# Patient Record
Sex: FEMALE | Race: WHITE | NOT HISPANIC OR LATINO | Employment: FULL TIME | URBAN - METROPOLITAN AREA
[De-identification: names, ages, dates, MRNs, and addresses within clinical notes are randomized per-mention and may not be internally consistent; named-entity substitution may affect disease eponyms.]

---

## 2017-09-14 ENCOUNTER — TRANSCRIBE ORDERS (OUTPATIENT)
Dept: ADMINISTRATIVE | Facility: HOSPITAL | Age: 47
End: 2017-09-14

## 2017-09-14 DIAGNOSIS — Z12.31 VISIT FOR SCREENING MAMMOGRAM: Primary | ICD-10-CM

## 2017-10-18 ENCOUNTER — HOSPITAL ENCOUNTER (OUTPATIENT)
Dept: RADIOLOGY | Facility: HOSPITAL | Age: 47
Discharge: HOME | End: 2017-10-18
Payer: COMMERCIAL

## 2017-10-18 DIAGNOSIS — Z12.31 VISIT FOR SCREENING MAMMOGRAM: ICD-10-CM

## 2017-10-18 PROCEDURE — G0202 SCR MAMMO BI INCL CAD: HCPCS

## 2017-11-22 ENCOUNTER — ALLSCRIPTS OFFICE VISIT (OUTPATIENT)
Dept: OTHER | Facility: OTHER | Age: 47
End: 2017-11-22

## 2017-11-22 PROCEDURE — 88305 TISSUE EXAM BY PATHOLOGIST: CPT | Performed by: SURGERY

## 2017-11-23 NOTE — CONSULTS
Assessment    1  Skin lesion (209 9) (L98 9)    Discussion/Summary  Discussion Summary:   Please see HPI  The lesion of the right upper eyelid was excised utilizing curved iris scissors  The area was prepped with alcohol and infiltrated with Xylocaine with epinephrine  6-0 nylon sutures were used for the repair  The usual posttreatment instructions were given, she'll be seen in 1 week for suture removal       Chief Complaint  Chief Complaint Free Text Note Form: Lesion right upper eyelid      History of Present Illness  HPI: Faye Chua presents today for evaluation of a vascular lesion of the right upper eyelid, it has been present for approximately one year, it has slowly grown in size  Review of Systems  Complete-Female:  Constitutional: no fever-- and-- no chills  Eyes: eyesight problems-- and-- Wears eyeglasses  ENT: no hearing loss  Cardiovascular: no chest pain-- and-- no lower extremity edema  Respiratory: no shortness of breath  Gastrointestinal: no nausea,-- no vomiting,-- no constipation,-- no diarrhea-- and-- no blood in stools  Genitourinary: no dysuria-- and-- no incontinence  Musculoskeletal: no limb swelling  Integumentary: no rashes-- and-- no skin wound  Neurological: no headache,-- no confusion-- and-- no convulsions  Psychiatric: depression, but-- no anxiety  Endocrine: no proptosis-- and-- no deepening of the voice  Hematologic/Lymphatic: no tendency for easy bleeding-- and-- no tendency for easy bruising  Active Problems  1  Depression (311) (F32 9)   2  Esophageal reflux (530 81) (K21 9)   3  Lumbago (724 2) (M54 5)    Past Medical History  1  History of Asthma (493 90) (J45 909)   2  History of Floaters   3  History of Fracture (829 0)  Active Problems And Past Medical History Reviewed: The active problems and past medical history were reviewed and updated today  Surgical History  1  History of  Section   2  History of Cholecystectomy   3   History of Hysteroscopy   4  History of Sinus Surgery  Surgical History Reviewed: The surgical history was reviewed and updated today  Family History  Family History    1  Family history of No Significant Family History  Family History Reviewed: The family history was reviewed and updated today  Social History     · Denied: History of Alcohol Use (History)   · Denied: History of Drug Use   · Marital History - Currently    · Never A Smoker   · Occupation:   · Sexually Active  Social History Reviewed: The social history was reviewed and updated today  The social history was reviewed and is unchanged  Current Meds   1  Cyclobenzaprine HCl - 5 MG Oral Tablet; TAKE 1 TABLET AT BEDTIME AS NEEDED; Therapy: [id number] to (Evaluate:11Nov2013); Last Rx:[id number] Ordered   2  Flexeril 5 MG TABS; TAKE 1 TABLET AT BEDTIME; Therapy: (Recorded:[id number]) to Recorded   3  Multi-Vitamin Oral Tablet; TAKE 1 TABLET DAILY; Therapy: (Recorded:[id number]) to Recorded   4  Naproxen 500 MG Oral Tablet; TAKE 1 TABLET EVERY 12 HOURS DAILY; Therapy: (Recorded:[id number]) to Recorded   5  PriLOSEC 40 MG CPDR; TAKE 1 CAPSULE DAILY; Therapy: (Recorded:81Oug8465) to Recorded   6  SEROquel 200 MG Oral Tablet; TAKE 1 TABLET DAILY; Therapy: (Recorded:33Kji8694) to Recorded   7  Vitamin C TABS; TAKE 1 TABLET DAILY; Therapy: (Recorded:49Ruk9803) to Recorded   8  Vitamin E TABS; TAKE 1 TABLET DAILY; Therapy: (Recorded:26Eba2425) to Recorded   9  Zoloft TABS; TAKE 1 TABLET DAILY; Therapy: (Recorded:48Psx6832) to Recorded  Medication List Reviewed: The medication list was reviewed and updated today  Allergies  1   Amoxicillin CAPS    Vitals  Vital Signs    Recorded: 22Nov2017 10:44AM   BP Comments unobtainable   Height 5 ft 4 in   Weight 239 lb 8 oz   BMI Calculated 41 11   BSA Calculated 2 11       Physical Exam  General Complete Exam (Brief) [facility] [place]:  Constitutional  General appearance: No acute distress, well appearing and well nourished  Eyes  Conjunctiva and lids: Abnormal  -- Purple, vascular lesion right upper eyelid, approximately 4 mm in diameter  Ears, Nose, Mouth, and Throat  External inspection of ears and nose: Normal    Pulmonary  Respiratory effort: No increased work of breathing or signs of respiratory distress  Musculoskeletal  Gait and station: Normal    Skin  Skin and subcutaneous tissue: Normal without rashes or lesions     Psychiatric  Orientation to person, place, and time: Normal    Mood and affect: Normal        Signatures   Electronically signed by : SARAH Stephens ; Nov 22 2017 11:11AM EST                       (Author)

## 2017-11-24 ENCOUNTER — LAB REQUISITION (OUTPATIENT)
Dept: LAB | Facility: HOSPITAL | Age: 47
End: 2017-11-24
Payer: COMMERCIAL

## 2017-11-24 DIAGNOSIS — L98.9 DISORDER OF SKIN OR SUBCUTANEOUS TISSUE: ICD-10-CM

## 2017-11-27 ENCOUNTER — GENERIC CONVERSION - ENCOUNTER (OUTPATIENT)
Dept: OTHER | Facility: OTHER | Age: 47
End: 2017-11-27

## 2018-01-13 VITALS — BODY MASS INDEX: 40.89 KG/M2 | WEIGHT: 239.5 LBS | HEIGHT: 64 IN

## 2018-01-18 NOTE — CONSULTS
History of Present Illness  Ewa presents today for evaluation of a vascular lesion of the right upper eyelid, it has been present for approximately one year, it has slowly grown in size  Discussion/Summary    Please see HPI  The lesion of the right upper eyelid was excised utilizing curved iris scissors  The area was prepped with alcohol and infiltrated with Xylocaine with epinephrine  6-0 nylon sutures were used for the repair   The usual posttreatment instructions were given, she'll be seen in 1 week for suture removal       Signatures   Electronically signed by : SARAH Robin ; Nov 22 2017 11:11AM EST                       (Author)

## 2018-07-30 DIAGNOSIS — I10 HYPERTENSION, UNSPECIFIED TYPE: Primary | ICD-10-CM

## 2018-07-30 DIAGNOSIS — E11.9 TYPE 2 DIABETES MELLITUS WITHOUT COMPLICATION, WITHOUT LONG-TERM CURRENT USE OF INSULIN (HCC): ICD-10-CM

## 2018-07-30 DIAGNOSIS — R79.89 LOW VITAMIN D LEVEL: ICD-10-CM

## 2018-07-30 DIAGNOSIS — F32.A DEPRESSION, UNSPECIFIED DEPRESSION TYPE: ICD-10-CM

## 2018-07-30 DIAGNOSIS — E78.5 HYPERLIPIDEMIA, UNSPECIFIED HYPERLIPIDEMIA TYPE: ICD-10-CM

## 2018-07-31 ENCOUNTER — TRANSCRIBE ORDERS (OUTPATIENT)
Dept: ADMINISTRATIVE | Facility: HOSPITAL | Age: 48
End: 2018-07-31

## 2018-07-31 ENCOUNTER — APPOINTMENT (OUTPATIENT)
Dept: LAB | Facility: IMAGING CENTER | Age: 48
End: 2018-07-31
Payer: COMMERCIAL

## 2018-07-31 DIAGNOSIS — I10 HYPERTENSION, UNSPECIFIED TYPE: ICD-10-CM

## 2018-07-31 DIAGNOSIS — E78.5 HYPERLIPIDEMIA, UNSPECIFIED HYPERLIPIDEMIA TYPE: ICD-10-CM

## 2018-07-31 DIAGNOSIS — R79.89 LOW VITAMIN D LEVEL: ICD-10-CM

## 2018-07-31 DIAGNOSIS — E11.9 TYPE 2 DIABETES MELLITUS WITHOUT COMPLICATION, WITHOUT LONG-TERM CURRENT USE OF INSULIN (HCC): ICD-10-CM

## 2018-07-31 DIAGNOSIS — F32.A DEPRESSION, UNSPECIFIED DEPRESSION TYPE: ICD-10-CM

## 2018-07-31 LAB
25(OH)D3 SERPL-MCNC: 34.3 NG/ML (ref 30–100)
ALBUMIN SERPL BCP-MCNC: 4.1 G/DL (ref 3.5–5)
ALP SERPL-CCNC: 65 U/L (ref 46–116)
ALT SERPL W P-5'-P-CCNC: 21 U/L (ref 12–78)
ANION GAP SERPL CALCULATED.3IONS-SCNC: 10 MMOL/L (ref 4–13)
AST SERPL W P-5'-P-CCNC: 9 U/L (ref 5–45)
BACTERIA UR QL AUTO: ABNORMAL /HPF
BASOPHILS # BLD AUTO: 0.06 THOUSANDS/ΜL (ref 0–0.1)
BASOPHILS NFR BLD AUTO: 1 % (ref 0–1)
BILIRUB SERPL-MCNC: 0.56 MG/DL (ref 0.2–1)
BILIRUB UR QL STRIP: NEGATIVE
BUN SERPL-MCNC: 13 MG/DL (ref 5–25)
CALCIUM SERPL-MCNC: 9.3 MG/DL (ref 8.3–10.1)
CHLORIDE SERPL-SCNC: 105 MMOL/L (ref 100–108)
CHOLEST SERPL-MCNC: 162 MG/DL (ref 50–200)
CLARITY UR: ABNORMAL
CO2 SERPL-SCNC: 25 MMOL/L (ref 21–32)
COLOR UR: YELLOW
CREAT SERPL-MCNC: 0.52 MG/DL (ref 0.6–1.3)
CREAT UR-MCNC: 104 MG/DL
EOSINOPHIL # BLD AUTO: 0.21 THOUSAND/ΜL (ref 0–0.61)
EOSINOPHIL NFR BLD AUTO: 2 % (ref 0–6)
ERYTHROCYTE [DISTWIDTH] IN BLOOD BY AUTOMATED COUNT: 12.5 % (ref 11.6–15.1)
EST. AVERAGE GLUCOSE BLD GHB EST-MCNC: 126 MG/DL
GFR SERPL CREATININE-BSD FRML MDRD: 113 ML/MIN/1.73SQ M
GLUCOSE P FAST SERPL-MCNC: 105 MG/DL (ref 65–99)
GLUCOSE UR STRIP-MCNC: ABNORMAL MG/DL
HBA1C MFR BLD: 6 % (ref 4.2–6.3)
HCT VFR BLD AUTO: 43.7 % (ref 34.8–46.1)
HDLC SERPL-MCNC: 52 MG/DL (ref 40–60)
HGB BLD-MCNC: 14.3 G/DL (ref 11.5–15.4)
HGB UR QL STRIP.AUTO: ABNORMAL
IMM GRANULOCYTES # BLD AUTO: 0.02 THOUSAND/UL (ref 0–0.2)
IMM GRANULOCYTES NFR BLD AUTO: 0 % (ref 0–2)
KETONES UR STRIP-MCNC: NEGATIVE MG/DL
LDLC SERPL CALC-MCNC: 89 MG/DL (ref 0–100)
LEUKOCYTE ESTERASE UR QL STRIP: ABNORMAL
LYMPHOCYTES # BLD AUTO: 2.95 THOUSANDS/ΜL (ref 0.6–4.47)
LYMPHOCYTES NFR BLD AUTO: 33 % (ref 14–44)
MCH RBC QN AUTO: 29.7 PG (ref 26.8–34.3)
MCHC RBC AUTO-ENTMCNC: 32.7 G/DL (ref 31.4–37.4)
MCV RBC AUTO: 91 FL (ref 82–98)
MICROALBUMIN UR-MCNC: 30.5 MG/L (ref 0–20)
MICROALBUMIN/CREAT 24H UR: 29 MG/G CREATININE (ref 0–30)
MONOCYTES # BLD AUTO: 0.42 THOUSAND/ΜL (ref 0.17–1.22)
MONOCYTES NFR BLD AUTO: 5 % (ref 4–12)
NEUTROPHILS # BLD AUTO: 5.35 THOUSANDS/ΜL (ref 1.85–7.62)
NEUTS SEG NFR BLD AUTO: 59 % (ref 43–75)
NITRITE UR QL STRIP: NEGATIVE
NON-SQ EPI CELLS URNS QL MICRO: ABNORMAL /HPF
NRBC BLD AUTO-RTO: 0 /100 WBCS
PH UR STRIP.AUTO: 6 [PH] (ref 4.5–8)
PLATELET # BLD AUTO: 193 THOUSANDS/UL (ref 149–390)
PMV BLD AUTO: 13.3 FL (ref 8.9–12.7)
POTASSIUM SERPL-SCNC: 4.3 MMOL/L (ref 3.5–5.3)
PROT SERPL-MCNC: 7.1 G/DL (ref 6.4–8.2)
PROT UR STRIP-MCNC: NEGATIVE MG/DL
RBC # BLD AUTO: 4.81 MILLION/UL (ref 3.81–5.12)
RBC #/AREA URNS AUTO: ABNORMAL /HPF
SODIUM SERPL-SCNC: 140 MMOL/L (ref 136–145)
SP GR UR STRIP.AUTO: 1.02 (ref 1–1.03)
TRIGL SERPL-MCNC: 104 MG/DL
TSH SERPL DL<=0.05 MIU/L-ACNC: 1.79 UIU/ML (ref 0.36–3.74)
UROBILINOGEN UR QL STRIP.AUTO: 1 E.U./DL
WBC # BLD AUTO: 9.01 THOUSAND/UL (ref 4.31–10.16)
WBC #/AREA URNS AUTO: ABNORMAL /HPF

## 2018-07-31 PROCEDURE — 81001 URINALYSIS AUTO W/SCOPE: CPT

## 2018-07-31 PROCEDURE — 83036 HEMOGLOBIN GLYCOSYLATED A1C: CPT

## 2018-07-31 PROCEDURE — 85025 COMPLETE CBC W/AUTO DIFF WBC: CPT

## 2018-07-31 PROCEDURE — 80053 COMPREHEN METABOLIC PANEL: CPT

## 2018-07-31 PROCEDURE — 80061 LIPID PANEL: CPT

## 2018-07-31 PROCEDURE — 84443 ASSAY THYROID STIM HORMONE: CPT

## 2018-07-31 PROCEDURE — 82306 VITAMIN D 25 HYDROXY: CPT

## 2018-07-31 PROCEDURE — 82043 UR ALBUMIN QUANTITATIVE: CPT

## 2018-07-31 PROCEDURE — 36415 COLL VENOUS BLD VENIPUNCTURE: CPT

## 2018-07-31 PROCEDURE — 82570 ASSAY OF URINE CREATININE: CPT

## 2018-09-28 DIAGNOSIS — E78.5 HYPERLIPIDEMIA, UNSPECIFIED HYPERLIPIDEMIA TYPE: Primary | ICD-10-CM

## 2018-09-28 DIAGNOSIS — E11.9 CONTROLLED TYPE 2 DIABETES MELLITUS WITHOUT COMPLICATION, WITH LONG-TERM CURRENT USE OF INSULIN (HCC): ICD-10-CM

## 2018-09-28 DIAGNOSIS — Z79.4 CONTROLLED TYPE 2 DIABETES MELLITUS WITHOUT COMPLICATION, WITH LONG-TERM CURRENT USE OF INSULIN (HCC): ICD-10-CM

## 2018-09-28 RX ORDER — ROSUVASTATIN CALCIUM 10 MG/1
10 TABLET, COATED ORAL DAILY
Qty: 90 TABLET | Refills: 1 | Status: SHIPPED | OUTPATIENT
Start: 2018-09-28 | End: 2019-03-24 | Stop reason: SDUPTHER

## 2018-09-28 RX ORDER — ROSUVASTATIN CALCIUM 10 MG/1
TABLET, COATED ORAL
COMMUNITY
Start: 2016-08-10 | End: 2018-09-28 | Stop reason: SDUPTHER

## 2018-11-23 DIAGNOSIS — E11.9 TYPE 2 DIABETES MELLITUS WITHOUT COMPLICATION, WITHOUT LONG-TERM CURRENT USE OF INSULIN (HCC): Primary | ICD-10-CM

## 2018-11-23 RX ORDER — REPAGLINIDE 1 MG/1
1 TABLET ORAL
COMMUNITY
End: 2018-11-23 | Stop reason: SDUPTHER

## 2018-11-23 RX ORDER — REPAGLINIDE 1 MG/1
1 TABLET ORAL
Qty: 90 TABLET | Refills: 3 | Status: SHIPPED | OUTPATIENT
Start: 2018-11-23 | End: 2019-11-21 | Stop reason: SDUPTHER

## 2018-12-02 ENCOUNTER — OFFICE VISIT (OUTPATIENT)
Dept: URGENT CARE | Age: 48
End: 2018-12-02
Payer: COMMERCIAL

## 2018-12-02 VITALS
HEART RATE: 91 BPM | SYSTOLIC BLOOD PRESSURE: 111 MMHG | DIASTOLIC BLOOD PRESSURE: 64 MMHG | TEMPERATURE: 99.3 F | WEIGHT: 233 LBS | OXYGEN SATURATION: 95 % | HEIGHT: 64 IN | BODY MASS INDEX: 39.78 KG/M2

## 2018-12-02 DIAGNOSIS — J20.8 ACUTE BRONCHITIS DUE TO OTHER SPECIFIED ORGANISMS: Primary | ICD-10-CM

## 2018-12-02 PROCEDURE — 99213 OFFICE O/P EST LOW 20 MIN: CPT | Performed by: PHYSICIAN ASSISTANT

## 2018-12-02 RX ORDER — QUETIAPINE FUMARATE 200 MG
150 TABLET ORAL
COMMUNITY

## 2018-12-02 RX ORDER — AZITHROMYCIN 250 MG/1
TABLET, FILM COATED ORAL
Qty: 6 TABLET | Refills: 0 | Status: SHIPPED | OUTPATIENT
Start: 2018-12-02 | End: 2018-12-06

## 2018-12-02 RX ORDER — SERTRALINE HYDROCHLORIDE 25 MG/1
1 TABLET, FILM COATED ORAL DAILY
COMMUNITY
End: 2020-10-27 | Stop reason: DRUGHIGH

## 2018-12-02 RX ORDER — LISINOPRIL 10 MG/1
10 TABLET ORAL
COMMUNITY
End: 2018-12-14 | Stop reason: SDUPTHER

## 2018-12-02 RX ORDER — BENZONATATE 100 MG/1
100 CAPSULE ORAL 3 TIMES DAILY PRN
Qty: 15 CAPSULE | Refills: 0 | Status: SHIPPED | OUTPATIENT
Start: 2018-12-02 | End: 2019-09-19

## 2018-12-02 RX ORDER — OMEPRAZOLE 40 MG/1
40 CAPSULE, DELAYED RELEASE ORAL
COMMUNITY
Start: 2015-05-06 | End: 2019-07-25 | Stop reason: SDUPTHER

## 2018-12-02 NOTE — PATIENT INSTRUCTIONS
Take antibiotic as directed until completed  Motrin and/or Tylenol as needed for fever control or body aches and pains  Use additional medications for symptomatic relief  Follow up with PCP in 3-5 days  Proceed to  ER if symptoms worsen  Acute Bronchitis   AMBULATORY CARE:   Acute bronchitis  is swelling and irritation in the air passages of your lungs  This irritation may cause you to cough or have other breathing problems  Acute bronchitis often starts because of another illness, such as a cold or the flu  The illness spreads from your nose and throat to your windpipe and airways  Bronchitis is often called a chest cold  Acute bronchitis lasts about 3 to 6 weeks and is usually not a serious illness  Your cough can last for several weeks  You may have any of the following symptoms:   · A cough with sputum that may be clear, yellow, or green    · Feeling more tired than usual, and body aches    · A fever and chills    · Wheezing when you breathe    · A tight chest or pain when you breathe or cough  Seek care immediately if:   · You cough up blood  · Your lips or fingernails turn blue  · You feel like you are not getting enough air when you breathe  Contact your healthcare provider if:   · You have a fever  · Your breathing problems do not go away or get worse  · Your cough does not get better within 4 weeks  · You have questions or concerns about your condition or care  Self-care:   · Get more rest   Rest helps your body to heal  Slowly start to do more each day  Rest when you feel it is needed  · Avoid irritants in the air  Avoid chemicals, fumes, and dust  Wear a face mask if you must work around dust or fumes  Stay inside on days when air pollution levels are high  If you have allergies, stay inside when pollen counts are high  Do not use aerosol products, such as spray-on deodorant, bug spray, and hair spray  · Do not smoke or be around others who smoke    Nicotine and other chemicals in cigarettes and cigars damages the cilia that move mucus out of your lungs  Ask your healthcare provider for information if you currently smoke and need help to quit  E-cigarettes or smokeless tobacco still contain nicotine  Talk to your healthcare provider before you use these products  · Drink liquids as directed  Liquids help keep your air passages moist and help you cough up mucus  You may need to drink more liquids when you have acute bronchitis  Ask how much liquid to drink each day and which liquids are best for you  · Use a humidifier or vaporizer  Use a cool mist humidifier or a vaporizer to increase air moisture in your home  This may make it easier for you to breathe and help decrease your cough  Prevent acute bronchitis by doing the following:   · Get the vaccinations you need  Ask your healthcare provider if you should get vaccinated against the flu or pneumonia  · Prevent the spread of germs  You can decrease your risk of acute bronchitis and other illnesses by doing the following:     [facility] your hands often with soap and water  Carry germ-killing hand lotion or gel with you  You can use the lotion or gel to clean your hands when soap and water are not available  ¨ Do not touch your eyes, nose, or mouth unless you have washed your hands first     ¨ Always cover your mouth when you cough to prevent the spread of germs  It is best to cough into a tissue or your shirt sleeve instead of into your hand  Ask those around you cover their mouths when they cough  ¨ Try to avoid people who have a cold or the flu  If you are sick, stay away from others as much as possible  Medicines: Your healthcare provider may  give you any of the following:  · Ibuprofen or acetaminophen  are medicines that help lower your fever  They are available without a doctor's order  Ask your healthcare provider which medicine is right for you  Ask how much to take and how often to take it   Follow directions  These medicines can cause stomach bleeding if not taken correctly  Ibuprofen can cause kidney damage  Do not take ibuprofen if you have kidney disease, an ulcer, or allergies to aspirin  Acetaminophen can cause liver damage  Do not take more than 4,000 milligrams in 24 hours  · Decongestants  help loosen mucus in your lungs and make it easier to cough up  This can help you breathe easier  · Cough suppressants  decrease your urge to cough  If your cough produces mucus, do not take a cough suppressant unless your healthcare provider tells you to  Your healthcare provider may suggest that you take a cough suppressant at night so you can rest     · Inhalers  may be given  Your healthcare provider may give you one or more inhalers to help you breathe easier and cough less  An inhaler gives your medicine to open your airways  Ask your healthcare provider to show you how to use your inhaler correctly  Follow up with your healthcare provider as directed:  Write down questions you have so you will remember to ask them during your follow-up visits  © 2017 [facility] Information is for End User's use only and may not be sold, redistributed or otherwise used for commercial purposes  All illustrations and images included in CareNotes® are the copyrighted property of A D A M , Inc  or Feliz Bustos  The above information is an  only  It is not intended as medical advice for individual conditions or treatments  Talk to your doctor, nurse or pharmacist before following any medical regimen to see if it is safe and effective for you

## 2018-12-02 NOTE — PROGRESS NOTES
[facility]        NAME: Asha Peguero is a 50 y o  female  : 1970    MRN: [id number]  DATE: 2018  TIME: 9:18 AM    Assessment and Plan   Acute bronchitis due to other specified organisms [J20 8]  1  Acute bronchitis due to other specified organisms           Patient Instructions     Take antibiotic as directed until completed  Motrin and/or Tylenol as needed for fever control or body aches and pains  Use additional medications for symptomatic relief  Follow up with PCP in 3-5 days  Proceed to  ER if symptoms worsen  Chief Complaint     Chief Complaint   Patient presents with    Cough     congestion 4 days         History of Present Illness       78-year-old female presents with five-day history of shortness of breath cough congestion body aches and pains fevers  Patient reports no nausea vomiting diarrhea  No chest pain or abdominal pain  Reports she has had sick exposures at work  Reports her asthma symptoms have worsened over the past several days  Has been using her inhalers and nebulizer more frequently      Cough   This is a new problem  The current episode started in the past 7 days  The problem has been gradually worsening  The problem occurs constantly  The cough is non-productive  Associated symptoms include a fever, nasal congestion, rhinorrhea, shortness of breath and wheezing  Pertinent negatives include no chest pain or chills  Nothing aggravates the symptoms  She has tried a beta-agonist inhaler and body position changes for the symptoms  The treatment provided no relief  Review of Systems   Review of Systems   Constitutional: Positive for fever  Negative for chills  HENT: Positive for rhinorrhea  Eyes: Negative  Respiratory: Positive for cough, shortness of breath and wheezing  Cardiovascular: Negative  Negative for chest pain  Gastrointestinal: Negative  Musculoskeletal: Negative  Skin: Negative  Neurological: Negative  Current Medications       Current Outpatient Prescriptions:     omeprazole (PriLOSEC) 40 MG capsule, Take 40 mg by mouth, Disp: , Rfl:     Dapagliflozin-Metformin HCl ER (XIGDUO XR)  MG TB24, Take 1 tablet by mouth daily with breakfast, Disp: 90 tablet, Rfl: 1    lisinopril (ZESTRIL) 10 mg tablet, Take 10 mg by mouth, Disp: , Rfl:     QUEtiapine (SEROQUEL) 200 mg tablet, Take 1 tablet by mouth daily, Disp: , Rfl:     repaglinide (PRANDIN) 1 mg tablet, Take 1 tablet (1 mg total) by mouth 3 (three) times a day before meals, Disp: 90 tablet, Rfl: 3    rosuvastatin (CRESTOR) 10 MG tablet, Take 1 tablet (10 mg total) by mouth daily, Disp: 90 tablet, Rfl: 1    sertraline (ZOLOFT) 25 mg tablet, Take 1 tablet by mouth daily, Disp: , Rfl:     Current Allergies     Allergies as of 12/02/2018 - Reviewed 12/02/2018   Allergen Reaction Noted    Cephalosporins Hives 06/15/2016    Amoxicillin Hives 06/10/2015            The following portions of the patient's history were reviewed and updated as appropriate: allergies, current medications, past family history, past medical history, past social history, past surgical history and problem list      No past medical history on file  No past surgical history on file  Family History   Problem Relation Age of Onset    Hypertension Mother     Diabetes type II Mother          Medications have been verified  Objective   /64   Pulse 91   Temp 99 3 °F (37 4 °C)   Ht 5' 4" (1 626 m)   Wt 106 kg (233 lb)   SpO2 95%   BMI 39 99 kg/m²        Physical Exam     Physical Exam   Constitutional: She is oriented to person, place, and time  She appears well-developed and well-nourished  No distress  HENT:   Head: Normocephalic and atraumatic  Right Ear: External ear normal    Left Ear: External ear normal    Nose: Nose normal    Mouth/Throat: Oropharynx is clear and moist  No oropharyngeal exudate     Eyes: Conjunctivae are normal  Right eye exhibits no discharge  Left eye exhibits no discharge  Neck: Normal range of motion  Neck supple  Cardiovascular: Normal rate, regular rhythm, normal heart sounds and intact distal pulses  No murmur heard  Pulmonary/Chest: Effort normal  No respiratory distress  She has decreased breath sounds (Mild)  She has wheezes (Mild)  She has no rhonchi  She has no rales  Abdominal: Soft  Bowel sounds are normal  There is no tenderness  Musculoskeletal: Normal range of motion  Lymphadenopathy:     She has no cervical adenopathy  Neurological: She is alert and oriented to person, place, and time  Skin: Skin is warm and dry  Psychiatric: She has a normal mood and affect  Nursing note and vitals reviewed

## 2018-12-02 NOTE — LETTER
December 2, 2018     Patient: Fercho Gonsalez   YOB: 1970   Date of Visit: 12/2/2018       To Whom it May Concern:    Eileen Mederos was seen in my clinic on 12/2/2018  She may return to work on 12/04/2018  If you have any questions or concerns, please don't hesitate to call  Sincerely,          Nikos Harris PA-C        CC: No Recipients

## 2018-12-14 DIAGNOSIS — I10 ESSENTIAL HYPERTENSION: Primary | ICD-10-CM

## 2018-12-16 RX ORDER — LISINOPRIL 10 MG/1
10 TABLET ORAL DAILY
Qty: 30 TABLET | Refills: 2 | Status: SHIPPED | OUTPATIENT
Start: 2018-12-16 | End: 2019-03-28 | Stop reason: SDUPTHER

## 2019-01-17 ENCOUNTER — TRANSCRIBE ORDERS (OUTPATIENT)
Dept: ADMINISTRATIVE | Facility: HOSPITAL | Age: 49
End: 2019-01-17

## 2019-01-17 DIAGNOSIS — Z12.31 ENCOUNTER FOR SCREENING MAMMOGRAM FOR MALIGNANT NEOPLASM OF BREAST: Primary | ICD-10-CM

## 2019-01-23 ENCOUNTER — HOSPITAL ENCOUNTER (OUTPATIENT)
Dept: RADIOLOGY | Facility: HOSPITAL | Age: 49
Discharge: HOME | End: 2019-01-23
Payer: COMMERCIAL

## 2019-01-23 VITALS — WEIGHT: 235 LBS | HEIGHT: 64 IN | BODY MASS INDEX: 40.12 KG/M2

## 2019-01-23 DIAGNOSIS — Z12.31 ENCOUNTER FOR SCREENING MAMMOGRAM FOR MALIGNANT NEOPLASM OF BREAST: ICD-10-CM

## 2019-01-23 PROCEDURE — 77067 SCR MAMMO BI INCL CAD: CPT

## 2019-03-24 DIAGNOSIS — Z79.4 CONTROLLED TYPE 2 DIABETES MELLITUS WITHOUT COMPLICATION, WITH LONG-TERM CURRENT USE OF INSULIN (HCC): ICD-10-CM

## 2019-03-24 DIAGNOSIS — E78.5 HYPERLIPIDEMIA, UNSPECIFIED HYPERLIPIDEMIA TYPE: ICD-10-CM

## 2019-03-24 DIAGNOSIS — E11.9 CONTROLLED TYPE 2 DIABETES MELLITUS WITHOUT COMPLICATION, WITH LONG-TERM CURRENT USE OF INSULIN (HCC): ICD-10-CM

## 2019-03-24 RX ORDER — DAPAGLIFLOZIN AND METFORMIN HYDROCHLORIDE 10; 1000 MG/1; MG/1
TABLET, FILM COATED, EXTENDED RELEASE ORAL
Qty: 90 TABLET | Refills: 1 | Status: SHIPPED | OUTPATIENT
Start: 2019-03-24 | End: 2019-03-28 | Stop reason: SDUPTHER

## 2019-03-24 RX ORDER — ROSUVASTATIN CALCIUM 10 MG/1
TABLET, COATED ORAL
Qty: 90 TABLET | Refills: 1 | Status: SHIPPED | OUTPATIENT
Start: 2019-03-24 | End: 2019-03-28 | Stop reason: SDUPTHER

## 2019-03-28 DIAGNOSIS — I10 ESSENTIAL HYPERTENSION: ICD-10-CM

## 2019-03-28 DIAGNOSIS — E11.9 CONTROLLED TYPE 2 DIABETES MELLITUS WITHOUT COMPLICATION, WITH LONG-TERM CURRENT USE OF INSULIN (HCC): ICD-10-CM

## 2019-03-28 DIAGNOSIS — E78.5 HYPERLIPIDEMIA, UNSPECIFIED HYPERLIPIDEMIA TYPE: ICD-10-CM

## 2019-03-28 DIAGNOSIS — Z79.4 CONTROLLED TYPE 2 DIABETES MELLITUS WITHOUT COMPLICATION, WITH LONG-TERM CURRENT USE OF INSULIN (HCC): ICD-10-CM

## 2019-03-28 RX ORDER — ROSUVASTATIN CALCIUM 10 MG/1
10 TABLET, COATED ORAL DAILY
Qty: 90 TABLET | Refills: 1 | Status: SHIPPED | OUTPATIENT
Start: 2019-03-28 | End: 2019-05-07 | Stop reason: SDUPTHER

## 2019-03-28 RX ORDER — LISINOPRIL 10 MG/1
10 TABLET ORAL DAILY
Qty: 30 TABLET | Refills: 2 | Status: SHIPPED | OUTPATIENT
Start: 2019-03-28 | End: 2019-07-04 | Stop reason: SDUPTHER

## 2019-03-28 NOTE — TELEPHONE ENCOUNTER
Pt requesting refill of crestor 10mg  Xigduo 10/1000mg  Lisinopril 10mg    Send to [facility]

## 2019-05-07 ENCOUNTER — OFFICE VISIT (OUTPATIENT)
Dept: FAMILY MEDICINE CLINIC | Facility: CLINIC | Age: 49
End: 2019-05-07
Payer: COMMERCIAL

## 2019-05-07 VITALS
HEIGHT: 64 IN | TEMPERATURE: 97.8 F | WEIGHT: 233.2 LBS | HEART RATE: 88 BPM | DIASTOLIC BLOOD PRESSURE: 70 MMHG | SYSTOLIC BLOOD PRESSURE: 118 MMHG | BODY MASS INDEX: 39.81 KG/M2 | RESPIRATION RATE: 16 BRPM | OXYGEN SATURATION: 98 %

## 2019-05-07 DIAGNOSIS — E66.01 MORBID OBESITY WITH BMI OF 40.0-44.9, ADULT (HCC): ICD-10-CM

## 2019-05-07 DIAGNOSIS — R53.83 OTHER FATIGUE: ICD-10-CM

## 2019-05-07 DIAGNOSIS — E78.5 HYPERLIPIDEMIA, UNSPECIFIED HYPERLIPIDEMIA TYPE: ICD-10-CM

## 2019-05-07 DIAGNOSIS — Z12.11 ENCOUNTER FOR SCREENING FOR MALIGNANT NEOPLASM OF COLON: Primary | ICD-10-CM

## 2019-05-07 DIAGNOSIS — K22.719 BARRETT'S ESOPHAGUS WITH DYSPLASIA: ICD-10-CM

## 2019-05-07 DIAGNOSIS — Z00.00 HEALTHCARE MAINTENANCE: ICD-10-CM

## 2019-05-07 DIAGNOSIS — E11.9 TYPE 2 DIABETES MELLITUS WITHOUT COMPLICATION, WITHOUT LONG-TERM CURRENT USE OF INSULIN (HCC): ICD-10-CM

## 2019-05-07 DIAGNOSIS — E55.9 VITAMIN D INSUFFICIENCY: ICD-10-CM

## 2019-05-07 DIAGNOSIS — I10 ESSENTIAL HYPERTENSION: ICD-10-CM

## 2019-05-07 PROCEDURE — 99396 PREV VISIT EST AGE 40-64: CPT | Performed by: FAMILY MEDICINE

## 2019-05-07 RX ORDER — ROSUVASTATIN CALCIUM 10 MG/1
10 TABLET, COATED ORAL DAILY
Qty: 90 TABLET | Refills: 1 | Status: SHIPPED | OUTPATIENT
Start: 2019-05-07 | End: 2020-06-25 | Stop reason: SDUPTHER

## 2019-05-23 ENCOUNTER — TELEPHONE (OUTPATIENT)
Dept: GASTROENTEROLOGY | Facility: CLINIC | Age: 49
End: 2019-05-23

## 2019-06-25 LAB
LEFT EYE DIABETIC RETINOPATHY: NORMAL
LEFT EYE DIABETIC RETINOPATHY: NORMAL
RIGHT EYE DIABETIC RETINOPATHY: NORMAL
RIGHT EYE DIABETIC RETINOPATHY: NORMAL

## 2019-06-28 ENCOUNTER — CONSULT (OUTPATIENT)
Dept: GASTROENTEROLOGY | Facility: CLINIC | Age: 49
End: 2019-06-28
Payer: COMMERCIAL

## 2019-06-28 VITALS
HEART RATE: 80 BPM | TEMPERATURE: 98.2 F | HEIGHT: 64 IN | DIASTOLIC BLOOD PRESSURE: 71 MMHG | WEIGHT: 232.2 LBS | BODY MASS INDEX: 39.64 KG/M2 | SYSTOLIC BLOOD PRESSURE: 118 MMHG

## 2019-06-28 DIAGNOSIS — D12.6 TUBULOVILLOUS ADENOMA OF COLON: Primary | ICD-10-CM

## 2019-06-28 DIAGNOSIS — K22.70 BARRETT'S ESOPHAGUS WITHOUT DYSPLASIA: ICD-10-CM

## 2019-06-28 DIAGNOSIS — E66.01 MORBID OBESITY WITH BMI OF 40.0-44.9, ADULT (HCC): ICD-10-CM

## 2019-06-28 PROBLEM — K22.719 BARRETT'S ESOPHAGUS WITH DYSPLASIA: Status: RESOLVED | Noted: 2019-05-07 | Resolved: 2019-06-28

## 2019-06-28 PROBLEM — Z12.11 ENCOUNTER FOR SCREENING FOR MALIGNANT NEOPLASM OF COLON: Status: RESOLVED | Noted: 2019-05-07 | Resolved: 2019-06-28

## 2019-06-28 PROCEDURE — 99244 OFF/OP CNSLTJ NEW/EST MOD 40: CPT | Performed by: INTERNAL MEDICINE

## 2019-06-28 RX ORDER — ACETAMINOPHEN AND CODEINE PHOSPHATE 120; 12 MG/5ML; MG/5ML
1 SOLUTION ORAL DAILY
Refills: 3 | COMMUNITY
Start: 2019-05-01

## 2019-06-28 NOTE — PROGRESS NOTES
[facility] - Outpatient Consultation  Ed Trinh 46 y o  female MRN: [id number]  Encounter: [id number]          ASSESSMENT AND PLAN:  Ms Anais Shaffer was seen today for Fiore's esophagus and colon cancer surveillance  Diagnoses and all orders for this visit:    Tubulovillous adenoma of colon: During 2014 colonoscopy, 1 tubular adenoma and 1 sessile serrated adenoma were removed from the cecum and   1 tubulovillous adenoma was removed from the ileocecal valve  SI will schedule her for surveillance colonoscopy at [facility]  Risks and benefits of the procedure were discussed  Risks include but are not limited to bleeding, perforation, missed lesion  She is agreeable to the procedure  Bowel prep instructions given  Fiore's esophagus without dysplasia: She was diagnosed with Fiore's in 2003  Esophageal biopsies from 2014 EGD did not show Fiore's esophagus  No dysplasia noted  Her epigastric burning pain is well controlled with omeprazole 40 mg QD  I will schedule her for EGD with biopsies to assess her Fiore's  Morbid obesity with BMI of 40 0-44 9, adult [facility]): Weight management was recently discussed with Dr Doni Alba  Follow-up as needed  ______________________________________________________________________    HPI:  Ed Trinh is a 46 y o  female with DMII, essential HTN, HLD and a hx of Vitamin D deficiency referred by her friend who is a nurse for evaluation and management of Fiore's esophagus with dyspepsia  She was diagnosed with Fiore's in 2003  Esophageal biopsies from 2014 EGD did not show Fiore's esophagus  No dysplasia noted  Her epigastric burning pain is well controlled with omeprazole 40 mg QD  She reports intermittent diarrhea following her cholecystectomy in 2003  During her most recent colonoscopy in 2014 with Dr Velasquez Model, 1 tubular adenoma and 1 sessile serrated adenoma were removed from the cecum     One tubulovillous adenoma with low grade dysplasia at margin was removed from the ileocecal valve  Superficial hyperplastic changes were noted in the sigmoid  She has had colonoscopies with polyps since   She has a mold allergy and is sensitive to the construction at the [address] [place] [address] [employer]  She does not smoke of drink alcohol  REVIEW OF SYSTEMS:    CONSTITUTIONAL: Denies any fever, chills, rigors, and weight loss  HEENT: No earache or tinnitus  Denies hearing loss or visual disturbances  CARDIOVASCULAR: No chest pain or palpitations  RESPIRATORY: Denies any cough, hemoptysis, shortness of breath or dyspnea on exertion  GASTROINTESTINAL: As noted in the History of Present Illness  GENITOURINARY: No problems with urination  Denies any hematuria or dysuria  NEUROLOGIC: No dizziness or vertigo, denies headaches  MUSCULOSKELETAL: Denies any muscle or joint pain  SKIN: Denies skin rashes or itching  ENDOCRINE: Denies excessive thirst  Denies intolerance to heat or cold  PSYCHOSOCIAL: Denies depression or anxiety  Denies any recent memory loss         Historical Information   Past Medical History:   Diagnosis Date    Encounter for screening for malignant neoplasm of colon 2019     Past Surgical History:   Procedure Laterality Date    COLONOSCOPY      UPPER GASTROINTESTINAL ENDOSCOPY       Social History   Social History     Substance and Sexual Activity   Alcohol Use Not on file     Social History     Substance and Sexual Activity   Drug Use Not on file     Social History     Tobacco Use   Smoking Status Former Smoker    Last attempt to quit: 1994    Years since quittin 5   Smokeless Tobacco Never Used   Tobacco Comment    No passive smoke exposure     Family History   Problem Relation Age of Onset    Hypertension Mother     Diabetes type II Mother     Breast cancer Sister 40    Breast cancer Maternal Aunt         age unknown   [place] Breast cancer Maternal Aunt         age unknown    Breast cancer Maternal Aunt         age unknown       Meds/Allergies       Current Outpatient Medications:     benzonatate (TESSALON PERLES) 100 mg capsule    Dapagliflozin-metFORMIN HCl ER (XIGDUO XR)  MG TB24    lisinopril (ZESTRIL) 10 mg tablet    norethindrone (MICRONOR) 0 35 MG tablet    omeprazole (PriLOSEC) 40 MG capsule    QUEtiapine (SEROQUEL) 200 mg tablet    repaglinide (PRANDIN) 1 mg tablet    rosuvastatin (CRESTOR) 10 MG tablet    sertraline (ZOLOFT) 50 mg tablet    Na Sulfate-K Sulfate-Mg Sulf (SUPREP BOWEL PREP KIT) 17 5-3 13-1 6 GM/177ML SOLN    sertraline (ZOLOFT) 25 mg tablet    Allergies   Allergen Reactions    Cephalosporins Hives    Amoxicillin Hives           Objective     Blood pressure 118/71, pulse 80, temperature 98 2 °F (36 8 °C), temperature source Tympanic, height 5' 4" (1 626 m), weight 105 kg (232 lb 3 2 oz)  Body mass index is 39 86 kg/m²  PHYSICAL EXAM:      General Appearance:   Alert, cooperative, no distress  HEENT:   Normocephalic, atraumatic, anicteric      Neck:  Supple, symmetrical, trachea midline   Lungs:   Clear to auscultation bilaterally; no rales, rhonchi or wheezing; respirations unlabored    Heart[de-identified]   Regular rate and rhythm; no murmur, rub, or gallop  Abdomen:   Soft, non-tender, non-distended; normal bowel sounds; no masses, no organomegaly    Genitalia:   Deferred    Rectal:   Deferred    Extremities:  No cyanosis, clubbing or edema    Pulses:  2+ and symmetric    Skin:  No jaundice, rashes, or lesions    Lymph nodes:  No palpable cervical lymphadenopathy        Lab Results:   No visits with results within 1 Day(s) from this visit     Latest known visit with results is:   Appointment on 07/31/2018   Component Date Value    WBC 07/31/2018 9 01     RBC 07/31/2018 4 81     Hemoglobin 07/31/2018 14 3     Hematocrit 07/31/2018 43 7     MCV 07/31/2018 91     MCH 07/31/2018 29 7     MCHC 07/31/2018 32 7     RDW 07/31/2018 12 5     MPV 07/31/2018 13 3*    Platelets 98/69/5718 193     nRBC 07/31/2018 0     Neutrophils Relative 07/31/2018 59     Immat GRANS % 07/31/2018 0     Lymphocytes Relative 07/31/2018 33     Monocytes Relative 07/31/2018 5     Eosinophils Relative 07/31/2018 2     Basophils Relative 07/31/2018 1     Neutrophils Absolute 07/31/2018 5 35     Immature Grans Absolute 07/31/2018 0 02     Lymphocytes Absolute 07/31/2018 2 95     Monocytes Absolute 07/31/2018 0 42     Eosinophils Absolute 07/31/2018 0 21     Basophils Absolute 07/31/2018 0 06     Sodium 07/31/2018 140     Potassium 07/31/2018 4 3     Chloride 07/31/2018 105     CO2 07/31/2018 25     ANION GAP 07/31/2018 10     BUN 07/31/2018 13     Creatinine 07/31/2018 0 52*    Glucose, Fasting 07/31/2018 105*    Calcium 07/31/2018 9 3     AST 07/31/2018 9     ALT 07/31/2018 21     Alkaline Phosphatase 07/31/2018 65     Total Protein 07/31/2018 7 1     Albumin 07/31/2018 4 1     Total Bilirubin 07/31/2018 0 56     eGFR 07/31/2018 113     Hemoglobin A1C 07/31/2018 6 0     EAG 07/31/2018 126     Vit D, 25-Hydroxy 07/31/2018 34 3     TSH 3RD GENERATON 07/31/2018 1 790     Cholesterol 07/31/2018 162     Triglycerides 07/31/2018 104     HDL, Direct 07/31/2018 52     LDL Calculated 07/31/2018 89          Radiology Results:   No results found  Attestation:   By signing my name below, Manangel luisstephanie Smith, attest that this documentation has been prepared under the direction and in the presence of NIKA Matos  Electronically Signed: Tommie Saravia  6/28/19       I, Debbie Olivarez, personally performed the services described in this documentation  All medical record entries made by the scribvance were at my direction and in my presence  I have reviewed the chart and discharge instructions and agree that the record reflects my personal performance and is accurate and complete   NIKA Matos  6/28/19

## 2019-06-28 NOTE — PATIENT INSTRUCTIONS
The patient is scheduled on 9/19/19 for a colon/egd with Dr Anival Huggins at the [facility] instructions were gone over with by the MA

## 2019-07-04 DIAGNOSIS — I10 ESSENTIAL HYPERTENSION: ICD-10-CM

## 2019-07-04 RX ORDER — LISINOPRIL 10 MG/1
TABLET ORAL
Qty: 30 TABLET | Refills: 2 | Status: SHIPPED | OUTPATIENT
Start: 2019-07-04 | End: 2019-10-25 | Stop reason: SDUPTHER

## 2019-07-10 ENCOUNTER — TELEPHONE (OUTPATIENT)
Dept: FAMILY MEDICINE CLINIC | Facility: CLINIC | Age: 49
End: 2019-07-10

## 2019-07-17 ENCOUNTER — TRANSCRIBE ORDERS (OUTPATIENT)
Dept: ADMINISTRATIVE | Facility: HOSPITAL | Age: 49
End: 2019-07-17

## 2019-07-17 ENCOUNTER — APPOINTMENT (OUTPATIENT)
Dept: LAB | Facility: IMAGING CENTER | Age: 49
End: 2019-07-17
Payer: COMMERCIAL

## 2019-07-17 DIAGNOSIS — E55.9 VITAMIN D INSUFFICIENCY: ICD-10-CM

## 2019-07-17 DIAGNOSIS — E11.9 TYPE 2 DIABETES MELLITUS WITHOUT COMPLICATION, WITHOUT LONG-TERM CURRENT USE OF INSULIN (HCC): ICD-10-CM

## 2019-07-17 LAB
25(OH)D3 SERPL-MCNC: 40 NG/ML (ref 30–100)
ALBUMIN SERPL BCP-MCNC: 3.9 G/DL (ref 3.5–5)
ALP SERPL-CCNC: 62 U/L (ref 46–116)
ALT SERPL W P-5'-P-CCNC: 21 U/L (ref 12–78)
ANION GAP SERPL CALCULATED.3IONS-SCNC: 8 MMOL/L (ref 4–13)
AST SERPL W P-5'-P-CCNC: 10 U/L (ref 5–45)
BASOPHILS # BLD AUTO: 0.05 THOUSANDS/ΜL (ref 0–0.1)
BASOPHILS NFR BLD AUTO: 1 % (ref 0–1)
BILIRUB SERPL-MCNC: 0.47 MG/DL (ref 0.2–1)
BUN SERPL-MCNC: 12 MG/DL (ref 5–25)
CALCIUM SERPL-MCNC: 8.9 MG/DL (ref 8.3–10.1)
CHLORIDE SERPL-SCNC: 109 MMOL/L (ref 100–108)
CHOLEST SERPL-MCNC: 151 MG/DL (ref 50–200)
CO2 SERPL-SCNC: 23 MMOL/L (ref 21–32)
CREAT SERPL-MCNC: 0.58 MG/DL (ref 0.6–1.3)
EOSINOPHIL # BLD AUTO: 0.14 THOUSAND/ΜL (ref 0–0.61)
EOSINOPHIL NFR BLD AUTO: 2 % (ref 0–6)
ERYTHROCYTE [DISTWIDTH] IN BLOOD BY AUTOMATED COUNT: 12.7 % (ref 11.6–15.1)
EST. AVERAGE GLUCOSE BLD GHB EST-MCNC: 131 MG/DL
GFR SERPL CREATININE-BSD FRML MDRD: 108 ML/MIN/1.73SQ M
GLUCOSE P FAST SERPL-MCNC: 113 MG/DL (ref 65–99)
HBA1C MFR BLD: 6.2 % (ref 4.2–6.3)
HCT VFR BLD AUTO: 42.6 % (ref 34.8–46.1)
HDLC SERPL-MCNC: 45 MG/DL (ref 40–60)
HGB BLD-MCNC: 14 G/DL (ref 11.5–15.4)
IMM GRANULOCYTES # BLD AUTO: 0.01 THOUSAND/UL (ref 0–0.2)
IMM GRANULOCYTES NFR BLD AUTO: 0 % (ref 0–2)
LDLC SERPL CALC-MCNC: 93 MG/DL (ref 0–100)
LYMPHOCYTES # BLD AUTO: 2.38 THOUSANDS/ΜL (ref 0.6–4.47)
LYMPHOCYTES NFR BLD AUTO: 30 % (ref 14–44)
MCH RBC QN AUTO: 30 PG (ref 26.8–34.3)
MCHC RBC AUTO-ENTMCNC: 32.9 G/DL (ref 31.4–37.4)
MCV RBC AUTO: 91 FL (ref 82–98)
MONOCYTES # BLD AUTO: 0.42 THOUSAND/ΜL (ref 0.17–1.22)
MONOCYTES NFR BLD AUTO: 5 % (ref 4–12)
NEUTROPHILS # BLD AUTO: 4.92 THOUSANDS/ΜL (ref 1.85–7.62)
NEUTS SEG NFR BLD AUTO: 62 % (ref 43–75)
NRBC BLD AUTO-RTO: 0 /100 WBCS
PLATELET # BLD AUTO: 207 THOUSANDS/UL (ref 149–390)
PMV BLD AUTO: 13.6 FL (ref 8.9–12.7)
POTASSIUM SERPL-SCNC: 4.1 MMOL/L (ref 3.5–5.3)
PROT SERPL-MCNC: 7 G/DL (ref 6.4–8.2)
RBC # BLD AUTO: 4.67 MILLION/UL (ref 3.81–5.12)
SODIUM SERPL-SCNC: 140 MMOL/L (ref 136–145)
TRIGL SERPL-MCNC: 67 MG/DL
TSH SERPL DL<=0.05 MIU/L-ACNC: 1.35 UIU/ML (ref 0.36–3.74)
WBC # BLD AUTO: 7.92 THOUSAND/UL (ref 4.31–10.16)

## 2019-07-17 PROCEDURE — 36415 COLL VENOUS BLD VENIPUNCTURE: CPT

## 2019-07-17 PROCEDURE — 80053 COMPREHEN METABOLIC PANEL: CPT

## 2019-07-17 PROCEDURE — 80061 LIPID PANEL: CPT

## 2019-07-17 PROCEDURE — 84443 ASSAY THYROID STIM HORMONE: CPT

## 2019-07-17 PROCEDURE — 82306 VITAMIN D 25 HYDROXY: CPT

## 2019-07-17 PROCEDURE — 85025 COMPLETE CBC W/AUTO DIFF WBC: CPT

## 2019-07-17 PROCEDURE — 83036 HEMOGLOBIN GLYCOSYLATED A1C: CPT

## 2019-07-25 ENCOUNTER — TELEPHONE (OUTPATIENT)
Dept: GASTROENTEROLOGY | Facility: AMBULARY SURGERY CENTER | Age: 49
End: 2019-07-25

## 2019-07-25 DIAGNOSIS — K22.70 BARRETT'S ESOPHAGUS WITHOUT DYSPLASIA: Primary | ICD-10-CM

## 2019-07-25 RX ORDER — OMEPRAZOLE 40 MG/1
40 CAPSULE, DELAYED RELEASE ORAL DAILY
Qty: 30 CAPSULE | Refills: 3 | Status: SHIPPED | OUTPATIENT
Start: 2019-07-25 | End: 2019-11-24 | Stop reason: SDUPTHER

## 2019-07-25 NOTE — TELEPHONE ENCOUNTER
bienvenido patient      Refill Request for Medication: prilosec    Dose: 40 mg    Quantity:     Pharm[facility]: [facility]

## 2019-07-29 DIAGNOSIS — E11.9 TYPE 2 DIABETES MELLITUS WITHOUT COMPLICATION, WITHOUT LONG-TERM CURRENT USE OF INSULIN (HCC): Primary | ICD-10-CM

## 2019-09-05 ENCOUNTER — ANESTHESIA EVENT (OUTPATIENT)
Dept: GASTROENTEROLOGY | Facility: AMBULARY SURGERY CENTER | Age: 49
End: 2019-09-05

## 2019-09-12 ENCOUNTER — TELEPHONE (OUTPATIENT)
Dept: GASTROENTEROLOGY | Facility: CLINIC | Age: 49
End: 2019-09-12

## 2019-09-12 NOTE — TELEPHONE ENCOUNTER
Attempted to start prior authorization on [employer], however per [employer], pt's insurance is no longer active  LMOM for patient to call my direct line with updated insurance info   Carlitos Avendano

## 2019-09-18 NOTE — ANESTHESIA PREPROCEDURE EVALUATION
Review of Systems/Medical History  Patient summary reviewed  Chart reviewed  No history of anesthetic complications     Cardiovascular  Negative cardio ROS Hyperlipidemia, Hypertension ,    Pulmonary  Negative pulmonary ROS        GI/Hepatic  Negative GI/hepatic ROS   GERD ,        Negative  ROS        Endo/Other  Diabetes type 1 Oral agent,      GYN  Negative gynecology ROS          Hematology  Negative hematology ROS      Musculoskeletal  Negative musculoskeletal ROS        Neurology  Negative neurology ROS      Psychology   Negative psychology ROS              Physical Exam    Airway    Mallampati score: II  TM Distance: >3 FB  Neck ROM: full     Dental       Cardiovascular  Comment: Negative ROS,     Pulmonary      Other Findings        Anesthesia Plan  ASA Score- 2     Anesthesia Type- IV sedation with anesthesia with ASA Monitors  Additional Monitors:   Airway Plan:         Plan Factors-    Induction- intravenous  Postoperative Plan-     Informed Consent- Anesthetic plan and risks discussed with patient  I personally reviewed this patient with the CRNA  Discussed and agreed on the Anesthesia Plan with the CRNA  Amena Hogue

## 2019-09-19 ENCOUNTER — ANESTHESIA (OUTPATIENT)
Dept: GASTROENTEROLOGY | Facility: AMBULARY SURGERY CENTER | Age: 49
End: 2019-09-19

## 2019-09-19 ENCOUNTER — HOSPITAL ENCOUNTER (OUTPATIENT)
Dept: GASTROENTEROLOGY | Facility: AMBULARY SURGERY CENTER | Age: 49
Setting detail: OUTPATIENT SURGERY
Discharge: HOME | End: 2019-09-19
Attending: INTERNAL MEDICINE | Admitting: INTERNAL MEDICINE
Payer: COMMERCIAL

## 2019-09-19 VITALS
RESPIRATION RATE: 18 BRPM | HEART RATE: 73 BPM | DIASTOLIC BLOOD PRESSURE: 70 MMHG | BODY MASS INDEX: 38.07 KG/M2 | WEIGHT: 223 LBS | HEIGHT: 64 IN | OXYGEN SATURATION: 98 % | SYSTOLIC BLOOD PRESSURE: 123 MMHG | TEMPERATURE: 97.9 F

## 2019-09-19 DIAGNOSIS — D12.6 TUBULOVILLOUS ADENOMA OF COLON: ICD-10-CM

## 2019-09-19 DIAGNOSIS — K22.70 BARRETT'S ESOPHAGUS WITHOUT DYSPLASIA: ICD-10-CM

## 2019-09-19 LAB — GLUCOSE SERPL-MCNC: 106 MG/DL (ref 65–140)

## 2019-09-19 PROCEDURE — 88305 TISSUE EXAM BY PATHOLOGIST: CPT | Performed by: PATHOLOGY

## 2019-09-19 PROCEDURE — 43239 EGD BIOPSY SINGLE/MULTIPLE: CPT | Performed by: INTERNAL MEDICINE

## 2019-09-19 PROCEDURE — 82948 REAGENT STRIP/BLOOD GLUCOSE: CPT

## 2019-09-19 PROCEDURE — NC001 PR NO CHARGE: Performed by: INTERNAL MEDICINE

## 2019-09-19 PROCEDURE — G0105 COLORECTAL SCRN; HI RISK IND: HCPCS | Performed by: INTERNAL MEDICINE

## 2019-09-19 RX ORDER — SODIUM CHLORIDE, SODIUM LACTATE, POTASSIUM CHLORIDE, CALCIUM CHLORIDE 600; 310; 30; 20 MG/100ML; MG/100ML; MG/100ML; MG/100ML
125 INJECTION, SOLUTION INTRAVENOUS CONTINUOUS
Status: DISCONTINUED | OUTPATIENT
Start: 2019-09-19 | End: 2019-09-23 | Stop reason: HOSPADM

## 2019-09-19 RX ORDER — PROPOFOL 10 MG/ML
INJECTION, EMULSION INTRAVENOUS AS NEEDED
Status: DISCONTINUED | OUTPATIENT
Start: 2019-09-19 | End: 2019-09-19 | Stop reason: SURG

## 2019-09-19 RX ADMIN — PROPOFOL 50 MG: 10 INJECTION, EMULSION INTRAVENOUS at 08:15

## 2019-09-19 RX ADMIN — PROPOFOL 50 MG: 10 INJECTION, EMULSION INTRAVENOUS at 08:39

## 2019-09-19 RX ADMIN — PROPOFOL 50 MG: 10 INJECTION, EMULSION INTRAVENOUS at 08:12

## 2019-09-19 RX ADMIN — PROPOFOL 50 MG: 10 INJECTION, EMULSION INTRAVENOUS at 08:25

## 2019-09-19 RX ADMIN — SODIUM CHLORIDE, SODIUM LACTATE, POTASSIUM CHLORIDE, AND CALCIUM CHLORIDE: .6; .31; .03; .02 INJECTION, SOLUTION INTRAVENOUS at 07:25

## 2019-09-19 RX ADMIN — PROPOFOL 50 MG: 10 INJECTION, EMULSION INTRAVENOUS at 08:34

## 2019-09-19 RX ADMIN — PROPOFOL 50 MG: 10 INJECTION, EMULSION INTRAVENOUS at 08:29

## 2019-09-19 RX ADMIN — SODIUM CHLORIDE, SODIUM LACTATE, POTASSIUM CHLORIDE, AND CALCIUM CHLORIDE: .6; .31; .03; .02 INJECTION, SOLUTION INTRAVENOUS at 08:39

## 2019-09-19 RX ADMIN — PROPOFOL 50 MG: 10 INJECTION, EMULSION INTRAVENOUS at 08:19

## 2019-09-19 RX ADMIN — PROPOFOL 100 MG: 10 INJECTION, EMULSION INTRAVENOUS at 08:08

## 2019-09-19 NOTE — ANESTHESIA POSTPROCEDURE EVALUATION
Post-Op Assessment Note    CV Status:  Stable  Pain Score: 0    Pain management: adequate     Mental Status:  Alert and awake   Hydration Status:  Euvolemic   PONV Controlled:  Controlled   Airway Patency:  Patent   Post Op Vitals Reviewed: Yes      Staff: CRNA           /65 (09/19/19 0842)    Temp      Pulse 82 (09/19/19 0842)   Resp 15 (09/19/19 0842)    SpO2 97 % (09/19/19 0842)

## 2019-09-19 NOTE — H&P
History and Physical -  [facility]  Ewa Padilla Forward 46 y o  female MRN: [id number]                  HPI: Dutch Cade is a 46y o  year old female who presents for history of Barrrett's esophagus and history of colon polyps  REVIEW OF SYSTEMS: Per the HPI, and otherwise unremarkable  Historical Information   Past Medical History:   Diagnosis Date    Encounter for screening for malignant neoplasm of colon 2019     Past Surgical History:   Procedure Laterality Date    COLONOSCOPY      UPPER GASTROINTESTINAL ENDOSCOPY       Social History   Social History     Substance and Sexual Activity   Alcohol Use Not on file     Social History     Substance and Sexual Activity   Drug Use Not on file     Social History     Tobacco Use   Smoking Status Former Smoker    Last attempt to quit: 1994    Years since quittin 7   Smokeless Tobacco Never Used   Tobacco Comment    No passive smoke exposure     Family History   Problem Relation Age of Onset    Hypertension Mother     Diabetes type II Mother     Breast cancer Sister 40    Breast cancer Maternal Aunt         age unknown   Jose Juan Tripathi Breast cancer Maternal Aunt         age unknown   Jose Juan Tripathi Breast cancer Maternal Aunt         age unknown       Meds/Allergies       (Not in a hospital admission)    Allergies   Allergen Reactions    Cephalosporins Hives    Amoxicillin Hives       Objective     /67   Pulse 95   Resp 18   Ht 5' 4" (1 626 m)   Wt 101 kg (223 lb)   LMP 2019   SpO2 98%   BMI 38 28 kg/m²       PHYSICAL EXAM    Gen: NAD  CV: RRR  CHEST: Clear  ABD: soft, NT/ND  EXT: no edema      ASSESSMENT/PLAN:  This is a 46y o  year old female here for EGD and colonoscopy, and she is stable and optimized for her procedure

## 2019-09-23 ENCOUNTER — TELEPHONE (OUTPATIENT)
Dept: FAMILY MEDICINE CLINIC | Facility: CLINIC | Age: 49
End: 2019-09-23

## 2019-09-23 NOTE — TELEPHONE ENCOUNTER
Called pt and spoke to pt  that she is due for her follow up appt   He will let her know to call office back

## 2019-09-26 ENCOUNTER — TELEPHONE (OUTPATIENT)
Dept: GASTROENTEROLOGY | Facility: CLINIC | Age: 49
End: 2019-09-26

## 2019-09-26 NOTE — TELEPHONE ENCOUNTER
bienvenido dowling    Pt was returning your call for results   She will call back because she is on vacation

## 2019-10-01 ENCOUNTER — TELEPHONE (OUTPATIENT)
Dept: FAMILY MEDICINE CLINIC | Facility: CLINIC | Age: 49
End: 2019-10-01

## 2019-10-08 DIAGNOSIS — Z79.4 CONTROLLED TYPE 2 DIABETES MELLITUS WITHOUT COMPLICATION, WITH LONG-TERM CURRENT USE OF INSULIN (HCC): ICD-10-CM

## 2019-10-08 DIAGNOSIS — E11.9 CONTROLLED TYPE 2 DIABETES MELLITUS WITHOUT COMPLICATION, WITH LONG-TERM CURRENT USE OF INSULIN (HCC): ICD-10-CM

## 2019-10-14 ENCOUNTER — TELEPHONE (OUTPATIENT)
Dept: FAMILY MEDICINE CLINIC | Facility: CLINIC | Age: 49
End: 2019-10-14

## 2019-10-17 NOTE — TELEPHONE ENCOUNTER
I called [facility] ([phone]) who states pt's plan does their own prior auths  I called plan([phone]) and was on hold for over an hour  I will try again tomorrow     Pt #[phone]

## 2019-10-23 DIAGNOSIS — E11.9 CONTROLLED TYPE 2 DIABETES MELLITUS WITHOUT COMPLICATION, WITH LONG-TERM CURRENT USE OF INSULIN (HCC): ICD-10-CM

## 2019-10-23 DIAGNOSIS — Z79.4 CONTROLLED TYPE 2 DIABETES MELLITUS WITHOUT COMPLICATION, WITH LONG-TERM CURRENT USE OF INSULIN (HCC): ICD-10-CM

## 2019-10-25 DIAGNOSIS — I10 ESSENTIAL HYPERTENSION: ICD-10-CM

## 2019-10-25 RX ORDER — LISINOPRIL 10 MG/1
TABLET ORAL
Qty: 30 TABLET | Refills: 2 | Status: SHIPPED | OUTPATIENT
Start: 2019-10-25 | End: 2020-01-29

## 2019-10-25 NOTE — TELEPHONE ENCOUNTER
I called [employer] [employer] several times and was disconnected  [employer] [facility] prior Harpreet Olvera form completed and faxed

## 2019-10-29 NOTE — TELEPHONE ENCOUNTER
Per Clinical service team, Roly webber     Must try Invokana, invokamet,jardiance, synjardy, synjardy xr  Please advise

## 2019-11-01 ENCOUNTER — APPOINTMENT (EMERGENCY)
Dept: RADIOLOGY | Facility: HOSPITAL | Age: 49
End: 2019-11-01
Payer: COMMERCIAL

## 2019-11-01 ENCOUNTER — HOSPITAL ENCOUNTER (EMERGENCY)
Facility: HOSPITAL | Age: 49
Discharge: HOME | End: 2019-11-01
Attending: EMERGENCY MEDICINE | Admitting: EMERGENCY MEDICINE
Payer: COMMERCIAL

## 2019-11-01 VITALS
HEART RATE: 83 BPM | OXYGEN SATURATION: 98 % | RESPIRATION RATE: 18 BRPM | SYSTOLIC BLOOD PRESSURE: 119 MMHG | TEMPERATURE: 97.9 F | DIASTOLIC BLOOD PRESSURE: 69 MMHG

## 2019-11-01 DIAGNOSIS — E11.9 TYPE 2 DIABETES MELLITUS WITHOUT COMPLICATION, WITHOUT LONG-TERM CURRENT USE OF INSULIN (HCC): Primary | ICD-10-CM

## 2019-11-01 DIAGNOSIS — R10.31 RIGHT LOWER QUADRANT ABDOMINAL PAIN: Primary | ICD-10-CM

## 2019-11-01 LAB
ALBUMIN SERPL BCP-MCNC: 3.9 G/DL (ref 3.5–5)
ALP SERPL-CCNC: 64 U/L (ref 46–116)
ALT SERPL W P-5'-P-CCNC: 26 U/L (ref 12–78)
ANION GAP SERPL CALCULATED.3IONS-SCNC: 6 MMOL/L (ref 4–13)
AST SERPL W P-5'-P-CCNC: 12 U/L (ref 5–45)
ATRIAL RATE: 77 BPM
BASOPHILS # BLD AUTO: 0.05 THOUSANDS/ΜL (ref 0–0.1)
BASOPHILS NFR BLD AUTO: 1 % (ref 0–1)
BILIRUB SERPL-MCNC: 0.57 MG/DL (ref 0.2–1)
BILIRUB UR QL STRIP: NEGATIVE
BUN SERPL-MCNC: 12 MG/DL (ref 5–25)
CALCIUM SERPL-MCNC: 9 MG/DL (ref 8.3–10.1)
CHLORIDE SERPL-SCNC: 104 MMOL/L (ref 100–108)
CLARITY UR: CLEAR
CO2 SERPL-SCNC: 28 MMOL/L (ref 21–32)
COLOR UR: YELLOW
CREAT SERPL-MCNC: 0.58 MG/DL (ref 0.6–1.3)
EOSINOPHIL # BLD AUTO: 0.12 THOUSAND/ΜL (ref 0–0.61)
EOSINOPHIL NFR BLD AUTO: 1 % (ref 0–6)
ERYTHROCYTE [DISTWIDTH] IN BLOOD BY AUTOMATED COUNT: 12.2 % (ref 11.6–15.1)
EXT PREG TEST URINE: NEGATIVE
EXT. CONTROL ED NAV: NORMAL
GFR SERPL CREATININE-BSD FRML MDRD: 108 ML/MIN/1.73SQ M
GLUCOSE SERPL-MCNC: 159 MG/DL (ref 65–140)
GLUCOSE UR STRIP-MCNC: NEGATIVE MG/DL
HCT VFR BLD AUTO: 40.5 % (ref 34.8–46.1)
HGB BLD-MCNC: 13.3 G/DL (ref 11.5–15.4)
HGB UR QL STRIP.AUTO: NEGATIVE
IMM GRANULOCYTES # BLD AUTO: 0.03 THOUSAND/UL (ref 0–0.2)
IMM GRANULOCYTES NFR BLD AUTO: 0 % (ref 0–2)
KETONES UR STRIP-MCNC: NEGATIVE MG/DL
LEUKOCYTE ESTERASE UR QL STRIP: NEGATIVE
LIPASE SERPL-CCNC: 134 U/L (ref 73–393)
LYMPHOCYTES # BLD AUTO: 2.67 THOUSANDS/ΜL (ref 0.6–4.47)
LYMPHOCYTES NFR BLD AUTO: 32 % (ref 14–44)
MCH RBC QN AUTO: 30.2 PG (ref 26.8–34.3)
MCHC RBC AUTO-ENTMCNC: 32.8 G/DL (ref 31.4–37.4)
MCV RBC AUTO: 92 FL (ref 82–98)
MONOCYTES # BLD AUTO: 0.43 THOUSAND/ΜL (ref 0.17–1.22)
MONOCYTES NFR BLD AUTO: 5 % (ref 4–12)
NEUTROPHILS # BLD AUTO: 5.07 THOUSANDS/ΜL (ref 1.85–7.62)
NEUTS SEG NFR BLD AUTO: 61 % (ref 43–75)
NITRITE UR QL STRIP: NEGATIVE
NRBC BLD AUTO-RTO: 0 /100 WBCS
P AXIS: 42 DEGREES
PH UR STRIP.AUTO: 6.5 [PH]
PLATELET # BLD AUTO: 213 THOUSANDS/UL (ref 149–390)
PMV BLD AUTO: 12.1 FL (ref 8.9–12.7)
POTASSIUM SERPL-SCNC: 3.7 MMOL/L (ref 3.5–5.3)
PR INTERVAL: 160 MS
PROT SERPL-MCNC: 7.4 G/DL (ref 6.4–8.2)
PROT UR STRIP-MCNC: NEGATIVE MG/DL
QRS AXIS: 81 DEGREES
QRSD INTERVAL: 88 MS
QT INTERVAL: 398 MS
QTC INTERVAL: 450 MS
RBC # BLD AUTO: 4.41 MILLION/UL (ref 3.81–5.12)
SODIUM SERPL-SCNC: 138 MMOL/L (ref 136–145)
SP GR UR STRIP.AUTO: 1 (ref 1–1.03)
T WAVE AXIS: 59 DEGREES
TROPONIN I SERPL-MCNC: <0.02 NG/ML
UROBILINOGEN UR QL STRIP.AUTO: 0.2 E.U./DL
VENTRICULAR RATE: 77 BPM
WBC # BLD AUTO: 8.37 THOUSAND/UL (ref 4.31–10.16)

## 2019-11-01 PROCEDURE — 93010 ELECTROCARDIOGRAM REPORT: CPT | Performed by: INTERNAL MEDICINE

## 2019-11-01 PROCEDURE — 99285 EMERGENCY DEPT VISIT HI MDM: CPT

## 2019-11-01 PROCEDURE — 93005 ELECTROCARDIOGRAM TRACING: CPT

## 2019-11-01 PROCEDURE — 96372 THER/PROPH/DIAG INJ SC/IM: CPT

## 2019-11-01 PROCEDURE — 81003 URINALYSIS AUTO W/O SCOPE: CPT | Performed by: EMERGENCY MEDICINE

## 2019-11-01 PROCEDURE — 83690 ASSAY OF LIPASE: CPT | Performed by: EMERGENCY MEDICINE

## 2019-11-01 PROCEDURE — 99285 EMERGENCY DEPT VISIT HI MDM: CPT | Performed by: EMERGENCY MEDICINE

## 2019-11-01 PROCEDURE — 85025 COMPLETE CBC W/AUTO DIFF WBC: CPT | Performed by: EMERGENCY MEDICINE

## 2019-11-01 PROCEDURE — 84484 ASSAY OF TROPONIN QUANT: CPT | Performed by: EMERGENCY MEDICINE

## 2019-11-01 PROCEDURE — 80053 COMPREHEN METABOLIC PANEL: CPT | Performed by: EMERGENCY MEDICINE

## 2019-11-01 PROCEDURE — 81025 URINE PREGNANCY TEST: CPT | Performed by: EMERGENCY MEDICINE

## 2019-11-01 PROCEDURE — 74177 CT ABD & PELVIS W/CONTRAST: CPT

## 2019-11-01 PROCEDURE — 36415 COLL VENOUS BLD VENIPUNCTURE: CPT

## 2019-11-01 RX ORDER — KETOROLAC TROMETHAMINE 30 MG/ML
15 INJECTION, SOLUTION INTRAMUSCULAR; INTRAVENOUS ONCE
Status: COMPLETED | OUTPATIENT
Start: 2019-11-01 | End: 2019-11-01

## 2019-11-01 RX ADMIN — KETOROLAC TROMETHAMINE 15 MG: 30 INJECTION, SOLUTION INTRAMUSCULAR at 08:56

## 2019-11-01 RX ADMIN — IOHEXOL 100 ML: 350 INJECTION, SOLUTION INTRAVENOUS at 08:31

## 2019-11-01 NOTE — DISCHARGE INSTRUCTIONS
You were seen today in the Emergency Department for abdominal pain  Your workup included blood tests, urine tests, and imaging of your abdomen and revealed nothing concerning at this time  Please follow up with your Primary Care Provider on Monday as scheduled to discuss her abdominal discomfort  Please return to the Emergency Department if you have fevers, chills, chest pain, shortness of breath, are unable to eat or drink, or have any other concerning symptoms  Please look this over and let your nurse and/or me know if you have any further questions before you leave

## 2019-11-01 NOTE — ED PROVIDER NOTES
History  Chief Complaint   Patient presents with    Abdominal Pain     RUQ abdominal pain since last saturday, h/o Hansel Busby is an 46y o  year old female with PMHx significant for Fiore's esophagus, colon polyps, DM II, HLD, who presents to the ED today with abdominal pain for 6 days  She is status post cholecystectomy in 2003  Patient says that she noticed some right upper quadrant abdominal pain starting last Thursday that occasionally feels sharp but is usually dull  Over the last couple of days the pain has started radiating into her right lower quadrant  She has chronic back pain that she feels this is separate pain and is unchanged from baseline  She has had bilateral temporal, sharp headaches for the last couple of days but no vision changes, hearing changes, rashes, tearing  She is concerned because she knows the measles going around but is unsure if her symptoms are related to measles  Her pain is not exacerbated or relieved by anything in particular  The pain is currently 2/10 in severity  Pain is not worse at any particular time of day  She has never had a pain like this before  Her last colonoscopy was in September and was normal   She has not had any other symptoms  She has been taking ibuprofen for pain control with no relief  No hx of gastric ulcers  The patient denies fevers, chills, lightheadedness or syncope, vertigo, nausea, vomiting, vision changes, hearing changes, chest pain, shortness of breath, cough,  changes in usual bowel movements, changes with urination, back pain, numbness or tingling, rashes, pain anywhere else in body other than the above  No vaginal bleeding or discharge  The patient has no recent travel history, new or changing medications  Patient denies use of drugs or alcohol        History provided by:  Patient   used: No    Abdominal Pain   Pain location:  RUQ and RLQ  Pain quality: dull    Pain severity:  Mild  Onset quality:  Gradual  Duration:  6 days  Timing:  Constant  Chronicity:  New  Context: previous surgery    Context: not awakening from sleep, not diet changes, not eating, not recent illness, not recent travel, not suspicious food intake and not trauma    Relieved by:  Nothing  Worsened by:  Nothing  Ineffective treatments:  None tried  Associated symptoms: no anorexia, no belching, no chest pain, no chills, no constipation, no cough, no diarrhea, no dysuria, no fatigue, no fever, no flatus, no hematemesis, no hematochezia, no hematuria, no melena, no nausea, no shortness of breath, no sore throat, no vaginal bleeding, no vaginal discharge and no vomiting        Prior to Admission Medications   Prescriptions Last Dose Informant Patient Reported? Taking?    Dapagliflozin-metFORMIN HCl ER (XIGDUO XR)  MG TB24 Not Taking at Unknown time  No No   Sig: Take 1 tablet by mouth daily with breakfast   Patient not taking: Reported on 11/1/2019   Empagliflozin-metFORMIN HCl ER (SYNJARDY XR)  MG TB24   No No   Sig: Take 1 tablet by mouth daily   Na Sulfate-K Sulfate-Mg Sulf (SUPREP BOWEL PREP KIT) 17 5-3 13-1 6 GM/177ML SOLN   No No   Sig: Take 1 Bottle by mouth once for 1 dose   QUEtiapine (SEROQUEL) 200 mg tablet 10/31/2019 at Unknown time Self Yes Yes   Sig: Take 150 mg by mouth daily at bedtime    lisinopril (ZESTRIL) 10 mg tablet 11/1/2019 at Unknown time  No Yes   Sig: TAKE 1 TABLET BY MOUTH EVERY DAY   norethindrone (MICRONOR) 0 35 MG tablet 10/31/2019 at Unknown time Self Yes Yes   Sig: Take 1 tablet by mouth daily   omeprazole (PriLOSEC) 40 MG capsule 11/1/2019 at Unknown time  No Yes   Sig: Take 1 capsule (40 mg total) by mouth daily   repaglinide (PRANDIN) 1 mg tablet 11/1/2019 at Unknown time Self No Yes   Sig: Take 1 tablet (1 mg total) by mouth 3 (three) times a day before meals   rosuvastatin (CRESTOR) 10 MG tablet 10/31/2019 at Unknown time Self No Yes   Sig: Take 1 tablet (10 mg total) by mouth daily sertraline (ZOLOFT) 25 mg tablet Not Taking at Unknown time Self Yes No   Sig: Take 1 tablet by mouth daily   sertraline (ZOLOFT) 50 mg tablet 10/31/2019 at Unknown time Self Yes Yes   Sig: Take 50 mg by mouth daily      Facility-Administered Medications: None       Past Medical History:   Diagnosis Date    Asthma     exercise induced    Colon polyp     Diabetes mellitus ([facility] [address] )     Encounter for screening for malignant neoplasm of colon 2019    GERD (gastroesophageal reflux disease)     Psychiatric disorder     depression       Past Surgical History:   Procedure Laterality Date    COLONOSCOPY      UPPER GASTROINTESTINAL ENDOSCOPY         Family History   Problem Relation Age of Onset    Hypertension Mother     Diabetes type II Mother     Breast cancer Sister 40    Breast cancer Maternal Aunt         age unknown   Alondra Slipper Breast cancer Maternal Aunt         age unknown    Breast cancer Maternal Aunt         age unknown     I have reviewed and agree with the history as documented  Social History     Tobacco Use    Smoking status: Former Smoker     Last attempt to quit: 1994     Years since quittin 8    Smokeless tobacco: Never Used    Tobacco comment: No passive smoke exposure   Substance Use Topics    Alcohol use: Never     Frequency: Never    Drug use: Never        Review of Systems   Constitutional: Negative for activity change, appetite change, chills, diaphoresis, fatigue and fever  HENT: Negative for rhinorrhea and sore throat  Eyes: Negative for visual disturbance  Respiratory: Negative for cough and shortness of breath  Cardiovascular: Negative for chest pain, palpitations and leg swelling  Gastrointestinal: Positive for abdominal pain  Negative for abdominal distention, anorexia, blood in stool, constipation, diarrhea, flatus, hematemesis, hematochezia, melena, nausea and vomiting     Genitourinary: Negative for decreased urine volume, difficulty urinating, dysuria, flank pain, hematuria, vaginal bleeding and vaginal discharge  Musculoskeletal: Negative for arthralgias, back pain, myalgias, neck pain and neck stiffness  Skin: Negative for rash and wound  Allergic/Immunologic: Negative for immunocompromised state  Neurological: Positive for headaches  Negative for dizziness, syncope, weakness, light-headedness and numbness  Hematological: Does not bruise/bleed easily  Psychiatric/Behavioral: Negative for confusion  All other systems reviewed and are negative  Physical Exam  ED Triage Vitals   Temperature Pulse Respirations Blood Pressure SpO2   11/01/19 0712 11/01/19 0721 11/01/19 0712 11/01/19 0721 11/01/19 0721   97 9 °F (36 6 °C) 80 16 138/65 99 %      Temp src Heart Rate Source Patient Position - Orthostatic VS BP Location FiO2 (%)   -- 11/01/19 0721 11/01/19 0721 11/01/19 0721 --    Monitor Sitting Left arm       Pain Score       11/01/19 0712       2             Orthostatic Vital Signs  Vitals:    11/01/19 0721 11/01/19 0840   BP: 138/65 119/69   Pulse: 80 83   Patient Position - Orthostatic VS: Sitting Sitting       Physical Exam   Constitutional: She is oriented to person, place, and time  She appears well-developed and well-nourished  No distress  HENT:   Head: Normocephalic and atraumatic  Mouth/Throat: Oropharynx is clear and moist    Eyes: Conjunctivae are normal  No scleral icterus  Neck: Neck supple  No JVD present  No tracheal deviation present  Cardiovascular: Normal rate, regular rhythm and intact distal pulses  Pulmonary/Chest: Effort normal and breath sounds normal  No respiratory distress  Abdominal: Soft  Normal appearance  She exhibits distension  She exhibits no mass  There is no tenderness  There is no rigidity, no rebound, no guarding and negative Sloan's sign  No reproducible pain   Musculoskeletal: She exhibits no edema or deformity  Neurological: She is alert and oriented to person, place, and time     Moves all extremities   Skin: Skin is warm and dry  Capillary refill takes less than 2 seconds  No rash noted  She is not diaphoretic  Psychiatric: She has a normal mood and affect  Her behavior is normal    Nursing note and vitals reviewed        ED Medications  Medications   iohexol (OMNIPAQUE) 350 MG/ML injection (MULTI-DOSE) 100 mL (100 mL Intravenous Given 11/1/19 0831)   ketorolac (TORADOL) injection 15 mg (15 mg Intramuscular Given 11/1/19 0856)       Diagnostic Studies  Results Reviewed     Procedure Component Value Units Date/Time    UA w Reflex to Microscopic [[id number]] Collected:  11/01/19 0748    Lab Status:  Final result Specimen:  Urine, Clean Catch Updated:  11/01/19 0803     Color, UA Yellow     Clarity, UA Clear     Specific [place], UA 1 005     pH, UA 6 5     Leukocytes, UA Negative     Nitrite, UA Negative     Protein, UA Negative mg/dl      Glucose, UA Negative mg/dl      Ketones, UA Negative mg/dl      Urobilinogen, UA 0 2 E U /dl      Bilirubin, UA Negative     Blood, UA Negative    POCT pregnancy, urine [[id number]]  (Normal) Resulted:  11/01/19 0753    Lab Status:  Final result Updated:  11/01/19 0754     EXT PREG TEST UR (Ref: Negative) Negative     Control Valid    Troponin I [[id number]]  (Normal) Collected:  11/01/19 0726    Lab Status:  Final result Specimen:  Blood from Arm, Right Updated:  11/01/19 0751     Troponin I <0 02 ng/mL     Comprehensive metabolic panel [[id number]]  (Abnormal) Collected:  11/01/19 0722    Lab Status:  Final result Specimen:  Blood from Arm, Right Updated:  11/01/19 0749     Sodium 138 mmol/L      Potassium 3 7 mmol/L      Chloride 104 mmol/L      CO2 28 mmol/L      ANION GAP 6 mmol/L      BUN 12 mg/dL      Creatinine 0 58 mg/dL      Glucose 159 mg/dL      Calcium 9 0 mg/dL      AST 12 U/L      ALT 26 U/L      Alkaline Phosphatase 64 U/L      Total Protein 7 4 g/dL      Albumin 3 9 g/dL      Total Bilirubin 0 57 mg/dL      eGFR 108 ml/min/1 73sq m     Narrative: National Kidney Disease Foundation guidelines for Chronic Kidney Disease (CKD):     Stage 1 with normal or high GFR (GFR > 90 mL/min/1 73 square meters)    Stage 2 Mild CKD (GFR = 60-89 mL/min/1 73 square meters)    Stage 3A Moderate CKD (GFR = 45-59 mL/min/1 73 square meters)    Stage 3B Moderate CKD (GFR = 30-44 mL/min/1 73 square meters)    Stage 4 Severe CKD (GFR = 15-29 mL/min/1 73 square meters)    Stage 5 End Stage CKD (GFR <15 mL/min/1 73 square meters)  Note: GFR calculation is accurate only with a steady state creatinine    Lipase [[id number]]  (Normal) Collected:  11/01/19 0722    Lab Status:  Final result Specimen:  Blood from Arm, Right Updated:  11/01/19 0749     Lipase 134 u/L     CBC and differential [[id number]] Collected:  11/01/19 0722    Lab Status:  Final result Specimen:  Blood from Arm, Right Updated:  11/01/19 0734     WBC 8 37 Thousand/uL      RBC 4 41 Million/uL      Hemoglobin 13 3 g/dL      Hematocrit 40 5 %      MCV 92 fL      MCH 30 2 pg      MCHC 32 8 g/dL      RDW 12 2 %      MPV 12 1 fL      Platelets 761 Thousands/uL      nRBC 0 /100 WBCs      Neutrophils Relative 61 %      Immat GRANS % 0 %      Lymphocytes Relative 32 %      Monocytes Relative 5 %      Eosinophils Relative 1 %      Basophils Relative 1 %      Neutrophils Absolute 5 07 Thousands/µL      Immature Grans Absolute 0 03 Thousand/uL      Lymphocytes Absolute 2 67 Thousands/µL      Monocytes Absolute 0 43 Thousand/µL      Eosinophils Absolute 0 12 Thousand/µL      Basophils Absolute 0 05 Thousands/µL                  CT abdomen pelvis with contrast   Final Result by Luis Daniel Boston MD (11/01 5233)      No acute findings with a normal appendix clearly demonstrated              Workstation performed: [id number]               Procedures  Procedures        ED Course  ED Course as of Nov 01 0908 Fri Nov 01, 2019   7888 EKG at 7:40AM interpreted by myself reveals the following:  -rate 77  -rhythm regular  -QRS normal  -axis normal  - normal intervals  -evidence of ischemia: no MISTI/STD, t wave changes  -comparison to prior: no changes                                     MDM  Number of Diagnoses or Management Options  Diagnosis management comments: EKG shows no signs of ischemia  No pain out of proportion to exam or worsening of pain with or shortly after eating  Pain not colicky  No peritoneal signs on exam   Patient has no pulsatile abdominal mass, known aneurysm, pulse deficit or asymmetry, tearing or ripping pain  No tenderness at McBurney's point and no positive Sloan sign, Rovsing sign, or psoas sign  No radiation of pain from flank to groin, CVA tenderness, urinary complains, or history of urolithiasis  No history of abdominal trauma or sickle cell disease  Patient does have hx of cholecystectomy in 2003 and prior c section  Patient is not pregnant  Vital signs normal, no fever  Patient is able to pass flatus and stool, which is normal for her, and can tolerate Po  Patient is follow up with her PCP on Monday  The patient agreeable to following up as an outpatient with return precautions given         Amount and/or Complexity of Data Reviewed  Clinical lab tests: ordered and reviewed  Tests in the radiology section of CPT®: ordered and reviewed  Tests in the medicine section of CPT®: ordered and reviewed  Review and summarize past medical records: yes  Discuss the patient with other providers: yes    Risk of Complications, Morbidity, and/or Mortality  Presenting problems: moderate  Diagnostic procedures: low  Management options: low    Patient Progress  Patient progress: stable      Disposition  Final diagnoses:   Right lower quadrant abdominal pain     Time reflects when diagnosis was documented in both MDM as applicable and the Disposition within this note     Time User Action Codes Description Comment    11/1/2019  9:07 AM Terrell Corona Add [R10 31] Right lower quadrant abdominal pain       ED Disposition     ED Disposition Condition Date/Time Comment    Discharge Stable Fri Nov 1, 2019  9:07 AM Jessica Wells discharge to home/self care  Return precautions given and questions answered  Follow-up Information     Follow up With Specialties Details Why Krystal Perez MD Family Medicine Go to  On 11/03/2019 as scheduled  [facility]            Patient's Medications   Discharge Prescriptions    EMPAGLIFLOZIN-METFORMIN HCL ER (SYNJARDY XR)  MG TB24    Take 1 tablet by mouth daily       Start Date: 11/1/2019 End Date: --       Order Dose: 1 tablet       Quantity: 90 tablet    Refills: 1     No discharge procedures on file  ED Provider  Attending physically available and evaluated Jessica Wells I managed the patient along with the ED Attending      Electronically Signed by         Shasta Adrian DO  11/01/19 9122

## 2019-11-01 NOTE — ED ATTENDING ATTESTATION
11/1/2019  IRocky MD, saw and evaluated the patient  I have discussed the patient with the resident/non-physician practitioner and agree with the resident's/non-physician practitioner's findings, Plan of Care, and MDM as documented in the resident's/non-physician practitioner's note, except where noted  All available labs and Radiology studies were reviewed  I was present for key portions of any procedure(s) performed by the resident/non-physician practitioner and I was immediately available to provide assistance  At this point I agree with the current assessment done in the Emergency Department  I have conducted an independent evaluation of this patient a history and physical is as follows: Last Saturday started with ruq abd pain now radiating to rlq  Pt states normal BMs no urinary symptoms    No nvd no fevers History of cholecystectomy in the past  Pt also co of bilateral temporal ha no neuro co PE: alert nad heart reg lungs clear abd soft tender r lateral lower abd MDM: will do ct labs and urine  ED Course         Critical Care Time  Procedures

## 2019-11-05 ENCOUNTER — OFFICE VISIT (OUTPATIENT)
Dept: FAMILY MEDICINE CLINIC | Facility: CLINIC | Age: 49
End: 2019-11-05
Payer: COMMERCIAL

## 2019-11-05 VITALS
RESPIRATION RATE: 16 BRPM | SYSTOLIC BLOOD PRESSURE: 118 MMHG | TEMPERATURE: 97.9 F | DIASTOLIC BLOOD PRESSURE: 70 MMHG | HEIGHT: 64 IN | OXYGEN SATURATION: 99 % | WEIGHT: 236.4 LBS | BODY MASS INDEX: 40.36 KG/M2 | HEART RATE: 74 BPM

## 2019-11-05 DIAGNOSIS — E78.5 HYPERLIPIDEMIA, UNSPECIFIED HYPERLIPIDEMIA TYPE: ICD-10-CM

## 2019-11-05 DIAGNOSIS — E66.01 MORBID OBESITY WITH BMI OF 40.0-44.9, ADULT (HCC): ICD-10-CM

## 2019-11-05 DIAGNOSIS — E11.9 TYPE 2 DIABETES MELLITUS WITHOUT COMPLICATION, WITHOUT LONG-TERM CURRENT USE OF INSULIN (HCC): Primary | ICD-10-CM

## 2019-11-05 DIAGNOSIS — I10 ESSENTIAL HYPERTENSION: ICD-10-CM

## 2019-11-05 DIAGNOSIS — M54.6 ACUTE RIGHT-SIDED THORACIC BACK PAIN: ICD-10-CM

## 2019-11-05 PROCEDURE — 3074F SYST BP LT 130 MM HG: CPT | Performed by: FAMILY MEDICINE

## 2019-11-05 PROCEDURE — 99214 OFFICE O/P EST MOD 30 MIN: CPT | Performed by: FAMILY MEDICINE

## 2019-11-05 PROCEDURE — 3008F BODY MASS INDEX DOCD: CPT | Performed by: FAMILY MEDICINE

## 2019-11-05 PROCEDURE — 3078F DIAST BP <80 MM HG: CPT | Performed by: FAMILY MEDICINE

## 2019-11-05 RX ORDER — CYCLOBENZAPRINE HCL 10 MG
10 TABLET ORAL 3 TIMES DAILY PRN
Qty: 30 TABLET | Refills: 0 | Status: SHIPPED | OUTPATIENT
Start: 2019-11-05 | End: 2021-04-21 | Stop reason: SDUPTHER

## 2019-11-05 NOTE — PROGRESS NOTES
Assessment/Plan:         Diagnoses and all orders for this visit:    Type 2 diabetes mellitus without complication, without long-term current use of insulin ([facility])  Comments:  Last A1c 6 2  Controlled, continue same and monitor  Orders:  -     CBC and differential; Future  -     Comprehensive metabolic panel; Future  -     Hemoglobin A1C; Future  -     Lipid Panel with Direct LDL reflex; Future  -     TSH, 3rd generation with Free T4 reflex; Future    Essential hypertension  Comments:  Controlled, continue same and monitor    Hyperlipidemia, unspecified hyperlipidemia type  Comments:  Last lipid panel normal  Controlled, continue same and monitor  Morbid obesity with BMI of 40 0-44 9, adult ([facility])  Comments:  Encouraged healthy diet and exercise    Acute right-sided thoracic back pain  -     cyclobenzaprine (FLEXERIL) 10 mg tablet; Take 1 tablet (10 mg total) by mouth 3 (three) times a day as needed for muscle spasms          Subjective:      Patient ID: Nhung Cyr is a 46 y o  female  D S  Is a 46year old female with multiple medical problems here for a 6 month follow up  Patient has no complaints today and is taking her medications  Patient had a mammogram this year, saw GI and had EGD and colonoscopy, follows with an eye doctor, and will be making an appointment with a podiatrist  Patient had her flu shot  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, went to ER on 11/1, patient stated her back pain is much improved  Patient stated for the past few weeks she has been struggling to get her diabetic medication refilled, stated her insurance wouldn't cover it, she was put on empagliflozin metformin which she hasn't picked up yet  Patient stated she is staying away from sugary foods and drinks         The following portions of the patient's history were reviewed and updated as appropriate: allergies, current medications, past family history, past medical history, past social history, past surgical history and problem list     Review of Systems   Constitutional: Negative for chills and fever  HENT: Negative for congestion, rhinorrhea and sore throat  Eyes: Negative for visual disturbance  Respiratory: Negative for cough and shortness of breath  Cardiovascular: Negative for chest pain and palpitations  Gastrointestinal: Negative for constipation, diarrhea, nausea and vomiting  Genitourinary: Negative for dysuria and hematuria  Musculoskeletal: Positive for back pain  Skin: Negative for rash  Neurological: Negative for dizziness, syncope, light-headedness and headaches  Hematological: Does not bruise/bleed easily  Objective:      /70 (BP Location: Left arm, Patient Position: Sitting, Cuff Size: Large)   Pulse 74   Temp 97 9 °F (36 6 °C) (Tympanic)   Resp 16   Ht 5' 4" (1 626 m)   Wt 107 kg (236 lb 6 4 oz)   LMP 10/20/2019   SpO2 99%   BMI 40 58 kg/m²          Physical Exam   Constitutional: She is oriented to person, place, and time  She appears well-developed and well-nourished  No distress  HENT:   Head: Normocephalic and atraumatic  Right Ear: External ear normal    Left Ear: External ear normal    Nose: Nose normal    Mouth/Throat: Oropharynx is clear and moist  No oropharyngeal exudate  Eyes: Pupils are equal, round, and reactive to light  Conjunctivae and EOM are normal    Neck: Normal range of motion  Neck supple  Cardiovascular: Normal rate, regular rhythm and normal heart sounds  No murmur heard  Pulmonary/Chest: Effort normal and breath sounds normal    Abdominal: Soft  Bowel sounds are normal  She exhibits no distension  There is no tenderness  Musculoskeletal: Normal range of motion  She exhibits no edema  Neurological: She is alert and oriented to person, place, and time  Psychiatric: She has a normal mood and affect  Nursing note and vitals reviewed

## 2019-11-21 DIAGNOSIS — E11.9 TYPE 2 DIABETES MELLITUS WITHOUT COMPLICATION, WITHOUT LONG-TERM CURRENT USE OF INSULIN (HCC): ICD-10-CM

## 2019-11-23 RX ORDER — REPAGLINIDE 1 MG/1
1 TABLET ORAL
Qty: 90 TABLET | Refills: 3 | Status: SHIPPED | OUTPATIENT
Start: 2019-11-23 | End: 2020-10-27 | Stop reason: SDUPTHER

## 2019-11-24 DIAGNOSIS — K22.70 BARRETT'S ESOPHAGUS WITHOUT DYSPLASIA: ICD-10-CM

## 2019-11-25 RX ORDER — OMEPRAZOLE 40 MG/1
CAPSULE, DELAYED RELEASE ORAL
Qty: 30 CAPSULE | Refills: 3 | Status: SHIPPED | OUTPATIENT
Start: 2019-11-25 | End: 2020-04-01 | Stop reason: SDUPTHER

## 2020-01-06 ENCOUNTER — TRANSCRIBE ORDERS (OUTPATIENT)
Dept: ADMINISTRATIVE | Facility: HOSPITAL | Age: 50
End: 2020-01-06

## 2020-01-06 DIAGNOSIS — Z12.31 ENCOUNTER FOR SCREENING MAMMOGRAM FOR MALIGNANT NEOPLASM OF BREAST: Primary | ICD-10-CM

## 2020-01-25 DIAGNOSIS — I10 ESSENTIAL HYPERTENSION: ICD-10-CM

## 2020-01-29 RX ORDER — LISINOPRIL 10 MG/1
TABLET ORAL
Qty: 30 TABLET | Refills: 0 | Status: SHIPPED | OUTPATIENT
Start: 2020-01-29 | End: 2020-03-22

## 2020-02-05 ENCOUNTER — HOSPITAL ENCOUNTER (OUTPATIENT)
Dept: RADIOLOGY | Facility: HOSPITAL | Age: 50
Discharge: HOME | End: 2020-02-05
Payer: COMMERCIAL

## 2020-02-05 VITALS — HEIGHT: 64 IN | WEIGHT: 236 LBS | BODY MASS INDEX: 40.29 KG/M2

## 2020-02-05 DIAGNOSIS — Z12.31 ENCOUNTER FOR SCREENING MAMMOGRAM FOR MALIGNANT NEOPLASM OF BREAST: ICD-10-CM

## 2020-02-05 PROCEDURE — 77067 SCR MAMMO BI INCL CAD: CPT

## 2020-03-21 DIAGNOSIS — I10 ESSENTIAL HYPERTENSION: ICD-10-CM

## 2020-03-22 RX ORDER — LISINOPRIL 10 MG/1
TABLET ORAL
Qty: 30 TABLET | Refills: 3 | Status: SHIPPED | OUTPATIENT
Start: 2020-03-22 | End: 2020-07-29 | Stop reason: SDUPTHER

## 2020-04-01 ENCOUNTER — TELEPHONE (OUTPATIENT)
Dept: GASTROENTEROLOGY | Facility: AMBULARY SURGERY CENTER | Age: 50
End: 2020-04-01

## 2020-04-01 DIAGNOSIS — K22.70 BARRETT'S ESOPHAGUS WITHOUT DYSPLASIA: ICD-10-CM

## 2020-04-01 RX ORDER — OMEPRAZOLE 40 MG/1
40 CAPSULE, DELAYED RELEASE ORAL DAILY
Qty: 30 CAPSULE | Refills: 2 | Status: SHIPPED | OUTPATIENT
Start: 2020-04-01 | End: 2020-06-29 | Stop reason: SDUPTHER

## 2020-04-29 DIAGNOSIS — E11.9 TYPE 2 DIABETES MELLITUS WITHOUT COMPLICATION, WITHOUT LONG-TERM CURRENT USE OF INSULIN (HCC): ICD-10-CM

## 2020-05-13 ENCOUNTER — TELEPHONE (OUTPATIENT)
Dept: FAMILY MEDICINE CLINIC | Facility: CLINIC | Age: 50
End: 2020-05-13

## 2020-06-24 ENCOUNTER — APPOINTMENT (OUTPATIENT)
Dept: LAB | Facility: IMAGING CENTER | Age: 50
End: 2020-06-24
Payer: COMMERCIAL

## 2020-06-24 ENCOUNTER — TRANSCRIBE ORDERS (OUTPATIENT)
Dept: ADMINISTRATIVE | Facility: HOSPITAL | Age: 50
End: 2020-06-24

## 2020-06-24 DIAGNOSIS — E11.9 TYPE 2 DIABETES MELLITUS WITHOUT COMPLICATION, WITHOUT LONG-TERM CURRENT USE OF INSULIN (HCC): ICD-10-CM

## 2020-06-24 LAB
ALBUMIN SERPL BCP-MCNC: 3.7 G/DL (ref 3.5–5)
ALP SERPL-CCNC: 66 U/L (ref 46–116)
ALT SERPL W P-5'-P-CCNC: 21 U/L (ref 12–78)
ANION GAP SERPL CALCULATED.3IONS-SCNC: 4 MMOL/L (ref 4–13)
AST SERPL W P-5'-P-CCNC: 16 U/L (ref 5–45)
BASOPHILS # BLD AUTO: 0.05 THOUSANDS/ΜL (ref 0–0.1)
BASOPHILS NFR BLD AUTO: 1 % (ref 0–1)
BILIRUB SERPL-MCNC: 0.51 MG/DL (ref 0.2–1)
BUN SERPL-MCNC: 13 MG/DL (ref 5–25)
CALCIUM SERPL-MCNC: 9.1 MG/DL (ref 8.3–10.1)
CHLORIDE SERPL-SCNC: 110 MMOL/L (ref 100–108)
CHOLEST SERPL-MCNC: 283 MG/DL (ref 50–200)
CO2 SERPL-SCNC: 24 MMOL/L (ref 21–32)
CREAT SERPL-MCNC: 0.56 MG/DL (ref 0.6–1.3)
CREAT UR-MCNC: 110 MG/DL
EOSINOPHIL # BLD AUTO: 0.1 THOUSAND/ΜL (ref 0–0.61)
EOSINOPHIL NFR BLD AUTO: 1 % (ref 0–6)
ERYTHROCYTE [DISTWIDTH] IN BLOOD BY AUTOMATED COUNT: 13 % (ref 11.6–15.1)
EST. AVERAGE GLUCOSE BLD GHB EST-MCNC: 123 MG/DL
GFR SERPL CREATININE-BSD FRML MDRD: 108 ML/MIN/1.73SQ M
GLUCOSE P FAST SERPL-MCNC: 112 MG/DL (ref 65–99)
HBA1C MFR BLD: 5.9 %
HCT VFR BLD AUTO: 44.2 % (ref 34.8–46.1)
HDLC SERPL-MCNC: 31 MG/DL
HGB BLD-MCNC: 14.3 G/DL (ref 11.5–15.4)
IMM GRANULOCYTES # BLD AUTO: 0.01 THOUSAND/UL (ref 0–0.2)
IMM GRANULOCYTES NFR BLD AUTO: 0 % (ref 0–2)
LDLC SERPL CALC-MCNC: 227 MG/DL (ref 0–100)
LYMPHOCYTES # BLD AUTO: 2.7 THOUSANDS/ΜL (ref 0.6–4.47)
LYMPHOCYTES NFR BLD AUTO: 38 % (ref 14–44)
MCH RBC QN AUTO: 30.2 PG (ref 26.8–34.3)
MCHC RBC AUTO-ENTMCNC: 32.4 G/DL (ref 31.4–37.4)
MCV RBC AUTO: 93 FL (ref 82–98)
MICROALBUMIN UR-MCNC: 14.8 MG/L (ref 0–20)
MICROALBUMIN/CREAT 24H UR: 13 MG/G CREATININE (ref 0–30)
MONOCYTES # BLD AUTO: 0.36 THOUSAND/ΜL (ref 0.17–1.22)
MONOCYTES NFR BLD AUTO: 5 % (ref 4–12)
NEUTROPHILS # BLD AUTO: 3.96 THOUSANDS/ΜL (ref 1.85–7.62)
NEUTS SEG NFR BLD AUTO: 55 % (ref 43–75)
NRBC BLD AUTO-RTO: 0 /100 WBCS
PLATELET # BLD AUTO: 231 THOUSANDS/UL (ref 149–390)
PMV BLD AUTO: 13.5 FL (ref 8.9–12.7)
POTASSIUM SERPL-SCNC: 4.1 MMOL/L (ref 3.5–5.3)
PROT SERPL-MCNC: 7.3 G/DL (ref 6.4–8.2)
RBC # BLD AUTO: 4.73 MILLION/UL (ref 3.81–5.12)
SODIUM SERPL-SCNC: 138 MMOL/L (ref 136–145)
TRIGL SERPL-MCNC: 127 MG/DL
TSH SERPL DL<=0.05 MIU/L-ACNC: 2.29 UIU/ML (ref 0.36–3.74)
WBC # BLD AUTO: 7.18 THOUSAND/UL (ref 4.31–10.16)

## 2020-06-24 PROCEDURE — 3061F NEG MICROALBUMINURIA REV: CPT | Performed by: FAMILY MEDICINE

## 2020-06-24 PROCEDURE — 82043 UR ALBUMIN QUANTITATIVE: CPT

## 2020-06-24 PROCEDURE — 3044F HG A1C LEVEL LT 7.0%: CPT | Performed by: FAMILY MEDICINE

## 2020-06-24 PROCEDURE — 85025 COMPLETE CBC W/AUTO DIFF WBC: CPT

## 2020-06-24 PROCEDURE — 82570 ASSAY OF URINE CREATININE: CPT

## 2020-06-24 PROCEDURE — 80061 LIPID PANEL: CPT

## 2020-06-24 PROCEDURE — 80053 COMPREHEN METABOLIC PANEL: CPT

## 2020-06-24 PROCEDURE — 84443 ASSAY THYROID STIM HORMONE: CPT

## 2020-06-24 PROCEDURE — 83036 HEMOGLOBIN GLYCOSYLATED A1C: CPT

## 2020-06-24 PROCEDURE — 36415 COLL VENOUS BLD VENIPUNCTURE: CPT

## 2020-06-25 ENCOUNTER — OFFICE VISIT (OUTPATIENT)
Dept: FAMILY MEDICINE CLINIC | Facility: CLINIC | Age: 50
End: 2020-06-25
Payer: COMMERCIAL

## 2020-06-25 VITALS
DIASTOLIC BLOOD PRESSURE: 60 MMHG | SYSTOLIC BLOOD PRESSURE: 108 MMHG | HEIGHT: 64 IN | TEMPERATURE: 98 F | BODY MASS INDEX: 39.84 KG/M2 | WEIGHT: 233.38 LBS | OXYGEN SATURATION: 97 % | RESPIRATION RATE: 16 BRPM | HEART RATE: 90 BPM

## 2020-06-25 DIAGNOSIS — E78.5 HYPERLIPIDEMIA, UNSPECIFIED HYPERLIPIDEMIA TYPE: ICD-10-CM

## 2020-06-25 DIAGNOSIS — E11.9 TYPE 2 DIABETES MELLITUS WITHOUT COMPLICATION, WITHOUT LONG-TERM CURRENT USE OF INSULIN (HCC): ICD-10-CM

## 2020-06-25 DIAGNOSIS — E66.01 MORBID OBESITY WITH BMI OF 40.0-44.9, ADULT (HCC): ICD-10-CM

## 2020-06-25 DIAGNOSIS — Z00.00 HEALTHCARE MAINTENANCE: Primary | ICD-10-CM

## 2020-06-25 DIAGNOSIS — I10 ESSENTIAL HYPERTENSION: ICD-10-CM

## 2020-06-25 PROCEDURE — 3074F SYST BP LT 130 MM HG: CPT | Performed by: FAMILY MEDICINE

## 2020-06-25 PROCEDURE — 99396 PREV VISIT EST AGE 40-64: CPT | Performed by: FAMILY MEDICINE

## 2020-06-25 PROCEDURE — 3044F HG A1C LEVEL LT 7.0%: CPT | Performed by: FAMILY MEDICINE

## 2020-06-25 PROCEDURE — 3078F DIAST BP <80 MM HG: CPT | Performed by: FAMILY MEDICINE

## 2020-06-25 PROCEDURE — 3008F BODY MASS INDEX DOCD: CPT | Performed by: FAMILY MEDICINE

## 2020-06-25 RX ORDER — ROSUVASTATIN CALCIUM 10 MG/1
10 TABLET, COATED ORAL DAILY
Qty: 90 TABLET | Refills: 1 | Status: SHIPPED | OUTPATIENT
Start: 2020-06-25 | End: 2020-10-27 | Stop reason: SDUPTHER

## 2020-06-29 DIAGNOSIS — K22.70 BARRETT'S ESOPHAGUS WITHOUT DYSPLASIA: ICD-10-CM

## 2020-06-29 RX ORDER — OMEPRAZOLE 40 MG/1
40 CAPSULE, DELAYED RELEASE ORAL DAILY
Qty: 90 CAPSULE | Refills: 2 | Status: SHIPPED | OUTPATIENT
Start: 2020-06-29 | End: 2021-01-06 | Stop reason: SDUPTHER

## 2020-07-29 DIAGNOSIS — E11.9 TYPE 2 DIABETES MELLITUS WITHOUT COMPLICATION, WITHOUT LONG-TERM CURRENT USE OF INSULIN (HCC): ICD-10-CM

## 2020-07-29 DIAGNOSIS — I10 ESSENTIAL HYPERTENSION: ICD-10-CM

## 2020-07-29 PROCEDURE — 4010F ACE/ARB THERAPY RXD/TAKEN: CPT | Performed by: FAMILY MEDICINE

## 2020-07-29 RX ORDER — LISINOPRIL 10 MG/1
10 TABLET ORAL DAILY
Qty: 90 TABLET | Refills: 0 | Status: SHIPPED | OUTPATIENT
Start: 2020-07-29 | End: 2020-10-27 | Stop reason: SDUPTHER

## 2020-08-26 ENCOUNTER — TELEPHONE (OUTPATIENT)
Dept: FAMILY MEDICINE CLINIC | Facility: CLINIC | Age: 50
End: 2020-08-26

## 2020-08-26 NOTE — TELEPHONE ENCOUNTER
Phone call from [facility] [address] at "[facility] podiatrist office) , she is requesting a copy of Srikanth guillory & her hgba1c (spoke with Jennifer Pierre to fax) Faxed to [phone]  [facility]

## 2020-10-13 ENCOUNTER — TELEMEDICINE (OUTPATIENT)
Dept: FAMILY MEDICINE CLINIC | Facility: CLINIC | Age: 50
End: 2020-10-13
Payer: COMMERCIAL

## 2020-10-13 VITALS — TEMPERATURE: 99.1 F

## 2020-10-13 DIAGNOSIS — Z20.822 CLOSE EXPOSURE TO COVID-19 VIRUS: ICD-10-CM

## 2020-10-13 DIAGNOSIS — J06.9 ACUTE UPPER RESPIRATORY INFECTION: Primary | ICD-10-CM

## 2020-10-13 PROCEDURE — U0003 INFECTIOUS AGENT DETECTION BY NUCLEIC ACID (DNA OR RNA); SEVERE ACUTE RESPIRATORY SYNDROME CORONAVIRUS 2 (SARS-COV-2) (CORONAVIRUS DISEASE [COVID-19]), AMPLIFIED PROBE TECHNIQUE, MAKING USE OF HIGH THROUGHPUT TECHNOLOGIES AS DESCRIBED BY CMS-2020-01-R: HCPCS | Performed by: PHYSICIAN ASSISTANT

## 2020-10-13 PROCEDURE — 3725F SCREEN DEPRESSION PERFORMED: CPT | Performed by: PHYSICIAN ASSISTANT

## 2020-10-13 PROCEDURE — 99213 OFFICE O/P EST LOW 20 MIN: CPT | Performed by: PHYSICIAN ASSISTANT

## 2020-10-14 ENCOUNTER — OFFICE VISIT (OUTPATIENT)
Dept: URGENT CARE | Facility: CLINIC | Age: 50
End: 2020-10-14

## 2020-10-14 ENCOUNTER — TELEMEDICINE (OUTPATIENT)
Dept: FAMILY MEDICINE CLINIC | Facility: CLINIC | Age: 50
End: 2020-10-14
Payer: COMMERCIAL

## 2020-10-14 ENCOUNTER — TELEPHONE (OUTPATIENT)
Dept: FAMILY MEDICINE CLINIC | Facility: CLINIC | Age: 50
End: 2020-10-14

## 2020-10-14 VITALS — WEIGHT: 233.6 LBS | HEIGHT: 64 IN | BODY MASS INDEX: 39.88 KG/M2

## 2020-10-14 DIAGNOSIS — U07.1 COVID-19 VIRUS DETECTED: Primary | ICD-10-CM

## 2020-10-14 DIAGNOSIS — U07.1 COVID-19: Primary | ICD-10-CM

## 2020-10-14 PROBLEM — Z20.822 CLOSE EXPOSURE TO COVID-19 VIRUS: Status: RESOLVED | Noted: 2020-10-13 | Resolved: 2020-10-14

## 2020-10-14 LAB — SARS-COV-2 RNA SPEC QL NAA+PROBE: DETECTED

## 2020-10-14 PROCEDURE — 99213 OFFICE O/P EST LOW 20 MIN: CPT | Performed by: PHYSICIAN ASSISTANT

## 2020-10-16 ENCOUNTER — TELEMEDICINE (OUTPATIENT)
Dept: FAMILY MEDICINE CLINIC | Facility: CLINIC | Age: 50
End: 2020-10-16
Payer: COMMERCIAL

## 2020-10-16 DIAGNOSIS — R06.02 SOB (SHORTNESS OF BREATH): ICD-10-CM

## 2020-10-16 DIAGNOSIS — U07.1 ACUTE RESPIRATORY DISEASE DUE TO SEVERE ACUTE RESPIRATORY SYNDROME CORONAVIRUS 2 (SARS-COV-2): Primary | ICD-10-CM

## 2020-10-16 DIAGNOSIS — J06.9 ACUTE RESPIRATORY DISEASE DUE TO SEVERE ACUTE RESPIRATORY SYNDROME CORONAVIRUS 2 (SARS-COV-2): Primary | ICD-10-CM

## 2020-10-16 PROCEDURE — 99213 OFFICE O/P EST LOW 20 MIN: CPT | Performed by: FAMILY MEDICINE

## 2020-10-16 RX ORDER — ALBUTEROL SULFATE 90 UG/1
2 AEROSOL, METERED RESPIRATORY (INHALATION) EVERY 6 HOURS PRN
Qty: 1 INHALER | Refills: 5 | Status: SHIPPED | OUTPATIENT
Start: 2020-10-16 | End: 2022-02-25 | Stop reason: SDUPTHER

## 2020-10-19 ENCOUNTER — TELEMEDICINE (OUTPATIENT)
Dept: FAMILY MEDICINE CLINIC | Facility: CLINIC | Age: 50
End: 2020-10-19
Payer: COMMERCIAL

## 2020-10-19 VITALS — SYSTOLIC BLOOD PRESSURE: 110 MMHG | TEMPERATURE: 98.6 F | DIASTOLIC BLOOD PRESSURE: 60 MMHG

## 2020-10-19 DIAGNOSIS — U07.1 ACUTE RESPIRATORY DISEASE DUE TO SEVERE ACUTE RESPIRATORY SYNDROME CORONAVIRUS 2 (SARS-COV-2): Primary | ICD-10-CM

## 2020-10-19 DIAGNOSIS — J06.9 ACUTE RESPIRATORY DISEASE DUE TO SEVERE ACUTE RESPIRATORY SYNDROME CORONAVIRUS 2 (SARS-COV-2): Primary | ICD-10-CM

## 2020-10-19 PROCEDURE — 3078F DIAST BP <80 MM HG: CPT | Performed by: FAMILY MEDICINE

## 2020-10-19 PROCEDURE — 3074F SYST BP LT 130 MM HG: CPT | Performed by: FAMILY MEDICINE

## 2020-10-19 PROCEDURE — 99213 OFFICE O/P EST LOW 20 MIN: CPT | Performed by: FAMILY MEDICINE

## 2020-10-21 ENCOUNTER — TELEMEDICINE (OUTPATIENT)
Dept: FAMILY MEDICINE CLINIC | Facility: CLINIC | Age: 50
End: 2020-10-21
Payer: COMMERCIAL

## 2020-10-21 DIAGNOSIS — J06.9 ACUTE RESPIRATORY DISEASE DUE TO SEVERE ACUTE RESPIRATORY SYNDROME CORONAVIRUS 2 (SARS-COV-2): Primary | ICD-10-CM

## 2020-10-21 DIAGNOSIS — U07.1 ACUTE RESPIRATORY DISEASE DUE TO SEVERE ACUTE RESPIRATORY SYNDROME CORONAVIRUS 2 (SARS-COV-2): Primary | ICD-10-CM

## 2020-10-21 PROCEDURE — 1036F TOBACCO NON-USER: CPT | Performed by: FAMILY MEDICINE

## 2020-10-21 PROCEDURE — 99213 OFFICE O/P EST LOW 20 MIN: CPT | Performed by: FAMILY MEDICINE

## 2020-10-23 ENCOUNTER — TELEPHONE (OUTPATIENT)
Dept: FAMILY MEDICINE CLINIC | Facility: CLINIC | Age: 50
End: 2020-10-23

## 2020-10-27 ENCOUNTER — TELEMEDICINE (OUTPATIENT)
Dept: FAMILY MEDICINE CLINIC | Facility: CLINIC | Age: 50
End: 2020-10-27
Payer: COMMERCIAL

## 2020-10-27 VITALS — BODY MASS INDEX: 39.48 KG/M2 | WEIGHT: 230 LBS

## 2020-10-27 DIAGNOSIS — R09.81 NASAL CONGESTION: ICD-10-CM

## 2020-10-27 DIAGNOSIS — E11.9 TYPE 2 DIABETES MELLITUS WITHOUT COMPLICATION, WITHOUT LONG-TERM CURRENT USE OF INSULIN (HCC): ICD-10-CM

## 2020-10-27 DIAGNOSIS — J06.9 ACUTE UPPER RESPIRATORY INFECTION: ICD-10-CM

## 2020-10-27 DIAGNOSIS — E78.5 HYPERLIPIDEMIA, UNSPECIFIED HYPERLIPIDEMIA TYPE: ICD-10-CM

## 2020-10-27 DIAGNOSIS — U07.1 ACUTE RESPIRATORY DISEASE DUE TO SEVERE ACUTE RESPIRATORY SYNDROME CORONAVIRUS 2 (SARS-COV-2): Primary | ICD-10-CM

## 2020-10-27 DIAGNOSIS — J06.9 ACUTE RESPIRATORY DISEASE DUE TO SEVERE ACUTE RESPIRATORY SYNDROME CORONAVIRUS 2 (SARS-COV-2): Primary | ICD-10-CM

## 2020-10-27 DIAGNOSIS — I10 ESSENTIAL HYPERTENSION: ICD-10-CM

## 2020-10-27 DIAGNOSIS — J21.8 ACUTE BRONCHIOLITIS DUE TO OTHER SPECIFIED ORGANISMS: ICD-10-CM

## 2020-10-27 PROCEDURE — 99214 OFFICE O/P EST MOD 30 MIN: CPT | Performed by: FAMILY MEDICINE

## 2020-10-27 PROCEDURE — 4010F ACE/ARB THERAPY RXD/TAKEN: CPT | Performed by: FAMILY MEDICINE

## 2020-10-27 RX ORDER — ROSUVASTATIN CALCIUM 10 MG/1
10 TABLET, COATED ORAL DAILY
Qty: 90 TABLET | Refills: 1 | Status: SHIPPED | OUTPATIENT
Start: 2020-10-27 | End: 2021-04-07 | Stop reason: SDUPTHER

## 2020-10-27 RX ORDER — REPAGLINIDE 1 MG/1
1 TABLET ORAL
Qty: 240 TABLET | Refills: 1 | Status: SHIPPED | OUTPATIENT
Start: 2020-10-27 | End: 2021-04-16

## 2020-10-27 RX ORDER — AZITHROMYCIN 250 MG/1
TABLET, FILM COATED ORAL
Qty: 6 TABLET | Refills: 0 | Status: SHIPPED | OUTPATIENT
Start: 2020-10-27 | End: 2020-10-31

## 2020-10-27 RX ORDER — PREDNISONE 50 MG/1
50 TABLET ORAL DAILY
Qty: 5 TABLET | Refills: 0 | Status: SHIPPED | OUTPATIENT
Start: 2020-10-27 | End: 2020-11-01

## 2020-10-27 RX ORDER — FLUTICASONE PROPIONATE 50 MCG
2 SPRAY, SUSPENSION (ML) NASAL DAILY
Qty: 16 G | Refills: 3 | Status: SHIPPED | OUTPATIENT
Start: 2020-10-27

## 2020-10-27 RX ORDER — LISINOPRIL 10 MG/1
10 TABLET ORAL DAILY
Qty: 90 TABLET | Refills: 1 | Status: SHIPPED | OUTPATIENT
Start: 2020-10-27 | End: 2021-04-21

## 2020-10-27 RX ORDER — EMPAGLIFLOZIN, METFORMIN HYDROCHLORIDE 25; 1000 MG/1; MG/1
1 TABLET, EXTENDED RELEASE ORAL DAILY
Qty: 90 TABLET | Refills: 1 | Status: SHIPPED | OUTPATIENT
Start: 2020-10-27 | End: 2021-05-07 | Stop reason: SDUPTHER

## 2020-10-30 ENCOUNTER — TELEMEDICINE (OUTPATIENT)
Dept: FAMILY MEDICINE CLINIC | Facility: CLINIC | Age: 50
End: 2020-10-30
Payer: COMMERCIAL

## 2020-10-30 DIAGNOSIS — U07.1 ACUTE RESPIRATORY DISEASE DUE TO SEVERE ACUTE RESPIRATORY SYNDROME CORONAVIRUS 2 (SARS-COV-2): Primary | ICD-10-CM

## 2020-10-30 DIAGNOSIS — J06.9 ACUTE RESPIRATORY DISEASE DUE TO SEVERE ACUTE RESPIRATORY SYNDROME CORONAVIRUS 2 (SARS-COV-2): Primary | ICD-10-CM

## 2020-10-30 PROCEDURE — 99212 OFFICE O/P EST SF 10 MIN: CPT | Performed by: FAMILY MEDICINE

## 2021-01-06 DIAGNOSIS — K22.70 BARRETT'S ESOPHAGUS WITHOUT DYSPLASIA: ICD-10-CM

## 2021-01-06 RX ORDER — OMEPRAZOLE 40 MG/1
40 CAPSULE, DELAYED RELEASE ORAL DAILY
Qty: 90 CAPSULE | Refills: 0 | Status: SHIPPED | OUTPATIENT
Start: 2021-01-06 | End: 2021-07-14 | Stop reason: SDUPTHER

## 2021-01-06 NOTE — TELEPHONE ENCOUNTER
GI Physician: Dr Nathaniel Negrete for Medication: Omeprazole    Dose: 40 mg    Quantity: 90    Pharmacy and Location: [facility]

## 2021-01-15 ENCOUNTER — IMMUNIZATIONS (OUTPATIENT)
Dept: FAMILY MEDICINE CLINIC | Facility: HOSPITAL | Age: 51
End: 2021-01-15

## 2021-01-15 DIAGNOSIS — Z23 ENCOUNTER FOR IMMUNIZATION: Primary | ICD-10-CM

## 2021-01-15 PROCEDURE — 0001A SARS-COV-2 / COVID-19 MRNA VACCINE (PFIZER-BIONTECH) 30 MCG: CPT

## 2021-01-15 PROCEDURE — 91300 SARS-COV-2 / COVID-19 MRNA VACCINE (PFIZER-BIONTECH) 30 MCG: CPT

## 2021-01-27 ENCOUNTER — TELEMEDICINE (OUTPATIENT)
Dept: GASTROENTEROLOGY | Facility: CLINIC | Age: 51
End: 2021-01-27
Payer: COMMERCIAL

## 2021-01-27 VITALS — WEIGHT: 230 LBS | BODY MASS INDEX: 39.27 KG/M2 | HEIGHT: 64 IN

## 2021-01-27 DIAGNOSIS — D12.6 TUBULAR ADENOMA OF COLON: ICD-10-CM

## 2021-01-27 DIAGNOSIS — K22.70 BARRETT'S ESOPHAGUS WITHOUT DYSPLASIA: Primary | ICD-10-CM

## 2021-01-27 PROCEDURE — 99213 OFFICE O/P EST LOW 20 MIN: CPT | Performed by: PHYSICIAN ASSISTANT

## 2021-01-27 NOTE — PROGRESS NOTES
Virtual Regular Visit      Assessment/Plan:    1  Fiore's esophagus without dysplasia  She was diagnosed with Fiore's esophagus in 2003  She had EGD with esophageal biopsies in 2014 and 2019 which were negative for Fiore's  She did have endoscopic evidence of possible short-segment Fiore's esophagus on most recent EGD  Her heartburn is well controlled with omeprazole 40 mg by mouth daily  We discussed that we can repeat EGD in 5 years with colonoscopy  If EGD is again negative for Fiore's esophagus, she likely does not have Fiore's  We discussed avoiding trigger foods like coffee, tomatoes, citrus, spicy foods, chocolate  I recommend avoiding eating 3 hours before bedtime and eating small frequent meals throughout the day  2  Tubular adenoma of colon  She had a normal colonoscopy in 2019  Due to personal history of colon polyps, she is due for repeat colonoscopy in 5 years (2024)  Follow-up in 1 year    Problem List Items Addressed This Visit     None               Reason for visit is follow-up Fiore's esophagus  Chief Complaint   Patient presents with    Fiore's Esophagus        Encounter provider Soha Newell PA-C    Provider located at [address] [place] [address]  [phone]      Recent Visits  No visits were found meeting these conditions  Showing recent visits within past 7 days and meeting all other requirements     Today's Visits  Date Type Provider Dept   01/27/21 Telemedicine Soha Newell, [address] [place] [address] today's visits and meeting all other requirements     Future Appointments  No visits were found meeting these conditions  Showing future appointments within next 150 days and meeting all other requirements        The patient was identified by name and date of birth   Merle Oliva was informed that this is a telemedicine visit and that the visit is being conducted through [employer] and patient was informed that this is not a secure, HIPAA-compliant platform  She agrees to proceed     My office door was closed  No one else was in the room  She acknowledged consent and understanding of privacy and security of the video platform  The patient has agreed to participate and understands they can discontinue the visit at any time  Patient is aware this is a billable service  Hesham Cadena is a 44 y o  female with obesity, type 2 diabetes mellitus, colon polyps, and Fiore's esophagus presenting for routine follow-up  She has been doing much better overall  She was diagnosed with COVID in the fall but now she has completely recovered  Her heartburn is well controlled with omeprazole 40 mg by mouth daily  She knows to avoid certain reflux trigger foods  She denies dysphagia  She denies nausea and vomiting  She denies frequent abdominal pain  She has some alternating diarrhea and constipation but this is mild and well controlled  She attributes this to IBS  She denies any blood in the stool        Past Medical History:   Diagnosis Date    Asthma     exercise induced    Colon polyp     COVID-19     [facility]     Encounter for screening for malignant neoplasm of colon 5/7/2019    GERD (gastroesophageal reflux disease)     Psychiatric disorder     depression       Past Surgical History:   Procedure Laterality Date    COLONOSCOPY      UPPER GASTROINTESTINAL ENDOSCOPY         Current Outpatient Medications   Medication Sig Dispense Refill    albuterol (PROVENTIL HFA,VENTOLIN HFA) 90 mcg/act inhaler Inhale 2 puffs every 6 (six) hours as needed for wheezing or shortness of breath 1 Inhaler 5    Empagliflozin-metFORMIN HCl ER (Synjardy XR)  MG TB24 Take 1 tablet by mouth daily 90 tablet 1    fluticasone (FLONASE) 50 mcg/act nasal spray 2 sprays into each nostril daily 16 g 3    lisinopril (ZESTRIL) 10 mg tablet Take 1 tablet (10 mg total) by mouth daily 90 tablet 1    norethindrone (MICRONOR) 0 35 MG tablet Take 1 tablet by mouth daily  3    omeprazole (PriLOSEC) 40 MG capsule Take 1 capsule (40 mg total) by mouth daily 90 capsule 0    QUEtiapine (SEROQUEL) 200 mg tablet Take 150 mg by mouth daily at bedtime       repaglinide (PRANDIN) 1 mg tablet Take 1 tablet (1 mg total) by mouth 3 (three) times a day before meals 240 tablet 1    rosuvastatin (CRESTOR) 10 MG tablet Take 1 tablet (10 mg total) by mouth daily 90 tablet 1    sertraline (ZOLOFT) 50 mg tablet Take 50 mg by mouth daily  0    cyclobenzaprine (FLEXERIL) 10 mg tablet Take 1 tablet (10 mg total) by mouth 3 (three) times a day as needed for muscle spasms (Patient not taking: Reported on 10/27/2020) 30 tablet 0    Dapagliflozin-metFORMIN HCl ER (XIGDUO XR)  MG TB24 Take 1 tablet by mouth daily with breakfast (Patient not taking: Reported on 11/1/2019) 90 tablet 0    Na Sulfate-K Sulfate-Mg Sulf (SUPREP BOWEL PREP KIT) 17 5-3 13-1 6 GM/177ML SOLN Take 1 Bottle by mouth once for 1 dose 1 Bottle 0     No current facility-administered medications for this visit  Allergies   Allergen Reactions    Cephalosporins Hives    Amoxicillin Hives     REVIEW OF SYSTEMS:    CONSTITUTIONAL: Denies any fever, chills, rigors, and weight loss  HEENT: No earache or tinnitus  Denies hearing loss or visual disturbances  CARDIOVASCULAR: No chest pain or palpitations  RESPIRATORY: Denies any cough, hemoptysis, shortness of breath or dyspnea on exertion  GASTROINTESTINAL: As noted in the History of Present Illness  GENITOURINARY: No problems with urination  Denies any hematuria or dysuria  NEUROLOGIC: No dizziness or vertigo, denies headaches  MUSCULOSKELETAL: Denies any muscle or joint pain  SKIN: Denies skin rashes or itching  ENDOCRINE: Denies excessive thirst  Denies intolerance to heat or cold    PSYCHOSOCIAL: Denies depression or anxiety  Denies any recent memory loss  VIDEO EXAMINATION:  Appearance and vitals taken from home devices    General Appearance:   Alert, cooperative, no distress   HEENT:  Normocephalic, atraumatic, anicteric  Neck supple, symmetrical, trachea midline  Lungs:   Equal chest rise and unlabored breathing, normal effort, no coughing  Cardiovascular:   No visualized JVD  Abdomen:   No abdominal distension  Skin:   No jaundice, rashes, or lesions  Musculoskeletal:   Normal range of motion visualized  Psych:  Normal affect and normal insight  Neuro:  Alert and appropriate  Vitals:    01/27/21 1332   Weight: 104 kg (230 lb)   Height: 5' 4" (1 626 m)       I spent 15 minutes directly with the patient during this visit      [address] acknowledges that she has consented to an online visit or consultation  She understands that the online visit is based solely on information provided by her, and that, in the absence of a face-to-face physical evaluation by the physician, the diagnosis she receives is both limited and provisional in terms of accuracy and completeness  This is not intended to replace a full medical face-to-face evaluation by the physician  Omega Bhandari understands and accepts these terms

## 2021-01-27 NOTE — LETTER
January 27, 2021     Ruy Beard MD  [address] [address]    Patient: Tracee Leary   YOB: 1970   Date of Visit: 1/27/2021       Dear Dr Sally Lange: Thank you for referring Elaine Sierra to me for evaluation  Below are my notes for this consultation  If you have questions, please do not hesitate to call me  I look forward to following your patient along with you  Sincerely,        Marianela Ackerman PA-C        CC: No Recipients  Marianela Ackerman PA-C  1/27/2021  2:30 PM  Sign when Signing Visit  Virtual Regular Visit      Assessment/Plan:    1  Fiore's esophagus without dysplasia  She was diagnosed with Fiore's esophagus in 2003  She had EGD with esophageal biopsies in 2014 and 2019 which were negative for Fiore's  She did have endoscopic evidence of possible short-segment Fiore's esophagus on most recent EGD  Her heartburn is well controlled with omeprazole 40 mg by mouth daily  We discussed that we can repeat EGD in 5 years with colonoscopy  If EGD is again negative for Fiore's esophagus, she likely does not have Fiore's  We discussed avoiding trigger foods like coffee, tomatoes, citrus, spicy foods, chocolate  I recommend avoiding eating 3 hours before bedtime and eating small frequent meals throughout the day  2  Tubular adenoma of colon  She had a normal colonoscopy in 2019  Due to personal history of colon polyps, she is due for repeat colonoscopy in 5 years (2024)      Follow-up in 1 year    Problem List Items Addressed This Visit     None               Reason for visit is follow-up Fiore's esophagus  Chief Complaint   Patient presents with    Fiore's Esophagus        Encounter provider Marianela Ackerman PA-C    Provider located at [address] [place] [address] [address] [facility]  [phone]      Recent Visits  No visits were found meeting these conditions  Showing recent visits within past 7 days and meeting all other requirements     Today's Visits  Date Type Provider Dept   01/27/21 Telemedicine [place], [address] today's visits and meeting all other requirements     Future Appointments  No visits were found meeting these conditions  Showing future appointments within next 150 days and meeting all other requirements        The patient was identified by name and date of birth  Remington Baker was informed that this is a telemedicine visit and that the visit is being conducted through [address] and patient was informed that this is not a secure, HIPAA-compliant platform  She agrees to proceed     My office door was closed  No one else was in the room  She acknowledged consent and understanding of privacy and security of the video platform  The patient has agreed to participate and understands they can discontinue the visit at any time  Patient is aware this is a billable service  Kavita Davila is a 44 y o  female with obesity, type 2 diabetes mellitus, colon polyps, and Fiore's esophagus presenting for routine follow-up  She has been doing much better overall  She was diagnosed with COVID in the fall but now she has completely recovered  Her heartburn is well controlled with omeprazole 40 mg by mouth daily  She knows to avoid certain reflux trigger foods  She denies dysphagia  She denies nausea and vomiting  She denies frequent abdominal pain  She has some alternating diarrhea and constipation but this is mild and well controlled  She attributes this to IBS  She denies any blood in the stool        Past Medical History:   Diagnosis Date    Asthma     exercise induced    Colon polyp     COVID-19     [facility]     Encounter for screening for malignant neoplasm of colon 5/7/2019    GERD (gastroesophageal reflux disease)     Psychiatric disorder depression       Past Surgical History:   Procedure Laterality Date    COLONOSCOPY      UPPER GASTROINTESTINAL ENDOSCOPY         Current Outpatient Medications   Medication Sig Dispense Refill    albuterol (PROVENTIL HFA,VENTOLIN HFA) 90 mcg/act inhaler Inhale 2 puffs every 6 (six) hours as needed for wheezing or shortness of breath 1 Inhaler 5    Empagliflozin-metFORMIN HCl ER (Synjardy XR)  MG TB24 Take 1 tablet by mouth daily 90 tablet 1    fluticasone (FLONASE) 50 mcg/act nasal spray 2 sprays into each nostril daily 16 g 3    lisinopril (ZESTRIL) 10 mg tablet Take 1 tablet (10 mg total) by mouth daily 90 tablet 1    norethindrone (MICRONOR) 0 35 MG tablet Take 1 tablet by mouth daily  3    omeprazole (PriLOSEC) 40 MG capsule Take 1 capsule (40 mg total) by mouth daily 90 capsule 0    QUEtiapine (SEROQUEL) 200 mg tablet Take 150 mg by mouth daily at bedtime       repaglinide (PRANDIN) 1 mg tablet Take 1 tablet (1 mg total) by mouth 3 (three) times a day before meals 240 tablet 1    rosuvastatin (CRESTOR) 10 MG tablet Take 1 tablet (10 mg total) by mouth daily 90 tablet 1    sertraline (ZOLOFT) 50 mg tablet Take 50 mg by mouth daily  0    cyclobenzaprine (FLEXERIL) 10 mg tablet Take 1 tablet (10 mg total) by mouth 3 (three) times a day as needed for muscle spasms (Patient not taking: Reported on 10/27/2020) 30 tablet 0    Dapagliflozin-metFORMIN HCl ER (XIGDUO XR)  MG TB24 Take 1 tablet by mouth daily with breakfast (Patient not taking: Reported on 11/1/2019) 90 tablet 0    Na Sulfate-K Sulfate-Mg Sulf (SUPREP BOWEL PREP KIT) 17 5-3 13-1 6 GM/177ML SOLN Take 1 Bottle by mouth once for 1 dose 1 Bottle 0     No current facility-administered medications for this visit  Allergies   Allergen Reactions    Cephalosporins Hives    Amoxicillin Hives     REVIEW OF SYSTEMS:    CONSTITUTIONAL: Denies any fever, chills, rigors, and weight loss  HEENT: No earache or tinnitus   Denies hearing loss or visual disturbances  CARDIOVASCULAR: No chest pain or palpitations  RESPIRATORY: Denies any cough, hemoptysis, shortness of breath or dyspnea on exertion  GASTROINTESTINAL: As noted in the History of Present Illness  GENITOURINARY: No problems with urination  Denies any hematuria or dysuria  NEUROLOGIC: No dizziness or vertigo, denies headaches  MUSCULOSKELETAL: Denies any muscle or joint pain  SKIN: Denies skin rashes or itching  ENDOCRINE: Denies excessive thirst  Denies intolerance to heat or cold  PSYCHOSOCIAL: Denies depression or anxiety  Denies any recent memory loss  VIDEO EXAMINATION:  Appearance and vitals taken from home devices    General Appearance:   Alert, cooperative, no distress   HEENT:  Normocephalic, atraumatic, anicteric  Neck supple, symmetrical, trachea midline  Lungs:   Equal chest rise and unlabored breathing, normal effort, no coughing  Cardiovascular:   No visualized JVD  Abdomen:   No abdominal distension  Skin:   No jaundice, rashes, or lesions  Musculoskeletal:   Normal range of motion visualized  Psych:  Normal affect and normal insight  Neuro:  Alert and appropriate  Vitals:    01/27/21 1332   Weight: 104 kg (230 lb)   Height: 5' 4" (1 626 m)       I spent 15 minutes directly with the patient during this visit      [address] acknowledges that she has consented to an online visit or consultation  She understands that the online visit is based solely on information provided by her, and that, in the absence of a face-to-face physical evaluation by the physician, the diagnosis she receives is both limited and provisional in terms of accuracy and completeness  This is not intended to replace a full medical face-to-face evaluation by the physician  Lisset Acosta understands and accepts these terms

## 2021-01-28 ENCOUNTER — LAB (OUTPATIENT)
Dept: LAB | Facility: IMAGING CENTER | Age: 51
End: 2021-01-28
Payer: COMMERCIAL

## 2021-01-28 ENCOUNTER — TRANSCRIBE ORDERS (OUTPATIENT)
Dept: ADMINISTRATIVE | Facility: HOSPITAL | Age: 51
End: 2021-01-28

## 2021-01-28 DIAGNOSIS — B35.1 TINEA UNGUIUM: ICD-10-CM

## 2021-01-28 DIAGNOSIS — B35.1 TINEA UNGUIUM: Primary | ICD-10-CM

## 2021-01-28 LAB
ALBUMIN SERPL BCP-MCNC: 4 G/DL (ref 3.5–5)
ALP SERPL-CCNC: 79 U/L (ref 46–116)
ALT SERPL W P-5'-P-CCNC: 27 U/L (ref 12–78)
AST SERPL W P-5'-P-CCNC: 10 U/L (ref 5–45)
BILIRUB DIRECT SERPL-MCNC: 0.13 MG/DL (ref 0–0.2)
BILIRUB SERPL-MCNC: 0.46 MG/DL (ref 0.2–1)
ERYTHROCYTE [DISTWIDTH] IN BLOOD BY AUTOMATED COUNT: 12.7 % (ref 11.6–15.1)
HCT VFR BLD AUTO: 44.9 % (ref 34.8–46.1)
HGB BLD-MCNC: 14.7 G/DL (ref 11.5–15.4)
MCH RBC QN AUTO: 30.2 PG (ref 26.8–34.3)
MCHC RBC AUTO-ENTMCNC: 32.7 G/DL (ref 31.4–37.4)
MCV RBC AUTO: 92 FL (ref 82–98)
PLATELET # BLD AUTO: 204 THOUSANDS/UL (ref 149–390)
PMV BLD AUTO: 13.1 FL (ref 8.9–12.7)
PROT SERPL-MCNC: 7.7 G/DL (ref 6.4–8.2)
RBC # BLD AUTO: 4.87 MILLION/UL (ref 3.81–5.12)
WBC # BLD AUTO: 6.74 THOUSAND/UL (ref 4.31–10.16)

## 2021-01-28 PROCEDURE — 80076 HEPATIC FUNCTION PANEL: CPT

## 2021-01-28 PROCEDURE — 85027 COMPLETE CBC AUTOMATED: CPT

## 2021-01-28 PROCEDURE — 36415 COLL VENOUS BLD VENIPUNCTURE: CPT

## 2021-02-05 ENCOUNTER — IMMUNIZATIONS (OUTPATIENT)
Dept: FAMILY MEDICINE CLINIC | Facility: HOSPITAL | Age: 51
End: 2021-02-05

## 2021-02-05 DIAGNOSIS — Z23 ENCOUNTER FOR IMMUNIZATION: Primary | ICD-10-CM

## 2021-02-05 PROCEDURE — 91300 SARS-COV-2 / COVID-19 MRNA VACCINE (PFIZER-BIONTECH) 30 MCG: CPT

## 2021-02-05 PROCEDURE — 0002A SARS-COV-2 / COVID-19 MRNA VACCINE (PFIZER-BIONTECH) 30 MCG: CPT

## 2021-04-07 ENCOUNTER — TRANSCRIBE ORDERS (OUTPATIENT)
Dept: ADMINISTRATIVE | Facility: HOSPITAL | Age: 51
End: 2021-04-07

## 2021-04-07 ENCOUNTER — APPOINTMENT (OUTPATIENT)
Dept: LAB | Facility: IMAGING CENTER | Age: 51
End: 2021-04-07
Payer: COMMERCIAL

## 2021-04-07 DIAGNOSIS — B35.1 TINEA UNGUIUM: Primary | ICD-10-CM

## 2021-04-07 DIAGNOSIS — E11.9 TYPE 2 DIABETES MELLITUS WITHOUT COMPLICATION, WITHOUT LONG-TERM CURRENT USE OF INSULIN (HCC): ICD-10-CM

## 2021-04-07 DIAGNOSIS — E78.5 HYPERLIPIDEMIA, UNSPECIFIED HYPERLIPIDEMIA TYPE: ICD-10-CM

## 2021-04-07 DIAGNOSIS — Z12.31 ENCOUNTER FOR SCREENING MAMMOGRAM FOR MALIGNANT NEOPLASM OF BREAST: Primary | ICD-10-CM

## 2021-04-07 DIAGNOSIS — B35.1 TINEA UNGUIUM: ICD-10-CM

## 2021-04-07 LAB
ALBUMIN SERPL BCP-MCNC: 4.3 G/DL (ref 3.5–5)
ALP SERPL-CCNC: 84 U/L (ref 46–116)
ALT SERPL W P-5'-P-CCNC: 28 U/L (ref 12–78)
ANION GAP SERPL CALCULATED.3IONS-SCNC: 8 MMOL/L (ref 4–13)
AST SERPL W P-5'-P-CCNC: 20 U/L (ref 5–45)
BASOPHILS # BLD AUTO: 0.06 THOUSANDS/ΜL (ref 0–0.1)
BASOPHILS NFR BLD AUTO: 1 % (ref 0–1)
BILIRUB DIRECT SERPL-MCNC: 0.07 MG/DL (ref 0–0.2)
BILIRUB SERPL-MCNC: 0.52 MG/DL (ref 0.2–1)
BUN SERPL-MCNC: 14 MG/DL (ref 5–25)
CALCIUM SERPL-MCNC: 9.5 MG/DL (ref 8.3–10.1)
CHLORIDE SERPL-SCNC: 105 MMOL/L (ref 100–108)
CHOLEST SERPL-MCNC: 188 MG/DL (ref 50–200)
CO2 SERPL-SCNC: 24 MMOL/L (ref 21–32)
CREAT SERPL-MCNC: 0.62 MG/DL (ref 0.6–1.3)
EOSINOPHIL # BLD AUTO: 0.14 THOUSAND/ΜL (ref 0–0.61)
EOSINOPHIL NFR BLD AUTO: 2 % (ref 0–6)
ERYTHROCYTE [DISTWIDTH] IN BLOOD BY AUTOMATED COUNT: 12.4 % (ref 11.6–15.1)
GFR SERPL CREATININE-BSD FRML MDRD: 104 ML/MIN/1.73SQ M
GLUCOSE P FAST SERPL-MCNC: 112 MG/DL (ref 65–99)
HCT VFR BLD AUTO: 47.2 % (ref 34.8–46.1)
HDLC SERPL-MCNC: 54 MG/DL
HGB BLD-MCNC: 15.6 G/DL (ref 11.5–15.4)
IMM GRANULOCYTES # BLD AUTO: 0.02 THOUSAND/UL (ref 0–0.2)
IMM GRANULOCYTES NFR BLD AUTO: 0 % (ref 0–2)
LDLC SERPL CALC-MCNC: 112 MG/DL (ref 0–100)
LYMPHOCYTES # BLD AUTO: 2.66 THOUSANDS/ΜL (ref 0.6–4.47)
LYMPHOCYTES NFR BLD AUTO: 37 % (ref 14–44)
MCH RBC QN AUTO: 30.6 PG (ref 26.8–34.3)
MCHC RBC AUTO-ENTMCNC: 33.1 G/DL (ref 31.4–37.4)
MCV RBC AUTO: 93 FL (ref 82–98)
MONOCYTES # BLD AUTO: 0.41 THOUSAND/ΜL (ref 0.17–1.22)
MONOCYTES NFR BLD AUTO: 6 % (ref 4–12)
NEUTROPHILS # BLD AUTO: 3.99 THOUSANDS/ΜL (ref 1.85–7.62)
NEUTS SEG NFR BLD AUTO: 54 % (ref 43–75)
NRBC BLD AUTO-RTO: 0 /100 WBCS
PLATELET # BLD AUTO: 231 THOUSANDS/UL (ref 149–390)
PMV BLD AUTO: 13.3 FL (ref 8.9–12.7)
POTASSIUM SERPL-SCNC: 4.4 MMOL/L (ref 3.5–5.3)
PROT SERPL-MCNC: 8.2 G/DL (ref 6.4–8.2)
RBC # BLD AUTO: 5.09 MILLION/UL (ref 3.81–5.12)
SODIUM SERPL-SCNC: 137 MMOL/L (ref 136–145)
TRIGL SERPL-MCNC: 108 MG/DL
TSH SERPL DL<=0.05 MIU/L-ACNC: 1.2 UIU/ML (ref 0.36–3.74)
WBC # BLD AUTO: 7.28 THOUSAND/UL (ref 4.31–10.16)

## 2021-04-07 PROCEDURE — 83036 HEMOGLOBIN GLYCOSYLATED A1C: CPT

## 2021-04-07 PROCEDURE — 80053 COMPREHEN METABOLIC PANEL: CPT

## 2021-04-07 PROCEDURE — 82248 BILIRUBIN DIRECT: CPT

## 2021-04-07 PROCEDURE — 36415 COLL VENOUS BLD VENIPUNCTURE: CPT

## 2021-04-07 PROCEDURE — 85025 COMPLETE CBC W/AUTO DIFF WBC: CPT

## 2021-04-07 PROCEDURE — 80061 LIPID PANEL: CPT

## 2021-04-07 PROCEDURE — 84443 ASSAY THYROID STIM HORMONE: CPT

## 2021-04-07 NOTE — TELEPHONE ENCOUNTER
Pt l/m requesting refill on Crestor  Request sent to provider  Pt due for appt  Pt sched appt in 2 weeks with Quincy Fuller, needs wed appt

## 2021-04-08 LAB
EST. AVERAGE GLUCOSE BLD GHB EST-MCNC: 126 MG/DL
HBA1C MFR BLD: 6 %

## 2021-04-08 PROCEDURE — 3044F HG A1C LEVEL LT 7.0%: CPT | Performed by: NURSE PRACTITIONER

## 2021-04-08 RX ORDER — ROSUVASTATIN CALCIUM 10 MG/1
10 TABLET, COATED ORAL DAILY
Qty: 90 TABLET | Refills: 0 | Status: SHIPPED | OUTPATIENT
Start: 2021-04-08 | End: 2021-09-28 | Stop reason: SDUPTHER

## 2021-04-14 ENCOUNTER — HOSPITAL ENCOUNTER (OUTPATIENT)
Dept: RADIOLOGY | Facility: IMAGING CENTER | Age: 51
Discharge: HOME | End: 2021-04-14
Payer: COMMERCIAL

## 2021-04-14 VITALS — WEIGHT: 230 LBS | BODY MASS INDEX: 39.27 KG/M2 | HEIGHT: 64 IN

## 2021-04-14 DIAGNOSIS — Z12.31 ENCOUNTER FOR SCREENING MAMMOGRAM FOR MALIGNANT NEOPLASM OF BREAST: ICD-10-CM

## 2021-04-14 PROCEDURE — 77067 SCR MAMMO BI INCL CAD: CPT

## 2021-04-16 ENCOUNTER — TELEPHONE (OUTPATIENT)
Dept: FAMILY MEDICINE CLINIC | Facility: CLINIC | Age: 51
End: 2021-04-16

## 2021-04-16 DIAGNOSIS — E11.9 TYPE 2 DIABETES MELLITUS WITHOUT COMPLICATION, WITHOUT LONG-TERM CURRENT USE OF INSULIN (HCC): ICD-10-CM

## 2021-04-16 RX ORDER — REPAGLINIDE 1 MG/1
TABLET ORAL
Qty: 240 TABLET | Refills: 1 | Status: SHIPPED | OUTPATIENT
Start: 2021-04-16 | End: 2021-07-09

## 2021-04-16 NOTE — TELEPHONE ENCOUNTER
----- Message from [address] sent at 4/16/2021 10:46 AM EDT -----  - Elevated fasting glucose but A1C well controlled  -  Cholesterol improving  Continue Crestor, low fat diet, and regular exercise  - The rest of her labs are stable

## 2021-04-21 ENCOUNTER — OFFICE VISIT (OUTPATIENT)
Dept: FAMILY MEDICINE CLINIC | Facility: CLINIC | Age: 51
End: 2021-04-21
Payer: COMMERCIAL

## 2021-04-21 VITALS
SYSTOLIC BLOOD PRESSURE: 120 MMHG | WEIGHT: 233.2 LBS | HEART RATE: 91 BPM | TEMPERATURE: 97.7 F | DIASTOLIC BLOOD PRESSURE: 60 MMHG | BODY MASS INDEX: 39.81 KG/M2 | OXYGEN SATURATION: 97 % | RESPIRATION RATE: 16 BRPM | HEIGHT: 64 IN

## 2021-04-21 DIAGNOSIS — I10 ESSENTIAL HYPERTENSION: ICD-10-CM

## 2021-04-21 DIAGNOSIS — R25.2 MUSCLE CRAMPS: Primary | ICD-10-CM

## 2021-04-21 DIAGNOSIS — E11.9 TYPE 2 DIABETES MELLITUS WITHOUT COMPLICATION, WITHOUT LONG-TERM CURRENT USE OF INSULIN (HCC): ICD-10-CM

## 2021-04-21 DIAGNOSIS — E78.5 HYPERLIPIDEMIA, UNSPECIFIED HYPERLIPIDEMIA TYPE: ICD-10-CM

## 2021-04-21 DIAGNOSIS — M54.6 ACUTE RIGHT-SIDED THORACIC BACK PAIN: ICD-10-CM

## 2021-04-21 PROCEDURE — 3078F DIAST BP <80 MM HG: CPT | Performed by: NURSE PRACTITIONER

## 2021-04-21 PROCEDURE — 99214 OFFICE O/P EST MOD 30 MIN: CPT | Performed by: NURSE PRACTITIONER

## 2021-04-21 PROCEDURE — 3008F BODY MASS INDEX DOCD: CPT | Performed by: NURSE PRACTITIONER

## 2021-04-21 PROCEDURE — 1036F TOBACCO NON-USER: CPT | Performed by: NURSE PRACTITIONER

## 2021-04-21 PROCEDURE — 3074F SYST BP LT 130 MM HG: CPT | Performed by: NURSE PRACTITIONER

## 2021-04-21 PROCEDURE — 4010F ACE/ARB THERAPY RXD/TAKEN: CPT | Performed by: NURSE PRACTITIONER

## 2021-04-21 RX ORDER — CYCLOBENZAPRINE HCL 10 MG
10 TABLET ORAL 3 TIMES DAILY PRN
Qty: 30 TABLET | Refills: 0 | Status: SHIPPED | OUTPATIENT
Start: 2021-04-21

## 2021-04-21 RX ORDER — LISINOPRIL 10 MG/1
TABLET ORAL
Qty: 90 TABLET | Refills: 1 | Status: SHIPPED | OUTPATIENT
Start: 2021-04-21 | End: 2021-09-28 | Stop reason: SDUPTHER

## 2021-04-21 NOTE — PROGRESS NOTES
Assessment/Plan:    Muscle cramps  -  Not well controlled  -  Discontinue terbinafine for now  -  Advised patient to monitor for improvement of muscle cramps  If not improving, contact office  Acute right-sided thoracic back pain  -  Stable  Continue same  Refills sent for Flexeril  Will continue to monitor  Hyperlipidemia  -  Well controlled on Crestor  Continue same  Will continue to monitor  Type 2 diabetes mellitus without complication (HCC)  -  Well controlled  -  Discussed low carb diet and daily exercise  -   Continue same  Will continue to monitor  Lab Results   Component Value Date    HGBA1C 6 0 (H) 04/07/2021       Essential hypertension  -  Well controlled  Continue same  Will continue to monitor     - Advised patient to return in about 2 months for annual physical      Diagnoses and all orders for this visit:    Muscle cramps    Acute right-sided thoracic back pain  Comments:  She was given prescription for Flexeril  It was discussed about side effects  Orders:  -     cyclobenzaprine (FLEXERIL) 10 mg tablet; Take 1 tablet (10 mg total) by mouth 3 (three) times a day as needed for muscle spasms    Hyperlipidemia, unspecified hyperlipidemia type    Type 2 diabetes mellitus without complication, without long-term current use of insulin (HCC)    Essential hypertension        Subjective:      Patient ID: Chip Akers is a 44 y o  female  Patient presents today for routine visit  She has been seeing a podiatrist for athlete's foot and toenail fungus  They placed her on terbinafine which she has been taking for the past 2 months  She noticed that for the past 2 weeks she is developing cramps in her upper and lower extremities  She has since stopped taking the medication  She is also on Crestor but has been on this for some time and has never had any issues  She did have her blood work done  Her LFTs are within normal range    Her A1c and cholesterol are well controlled  M*Modal software was used to dictate this note  It may contain errors with dictating incorrect words/spelling  Please contact provider directly for any questions  The following portions of the patient's history were reviewed and updated as appropriate: allergies, current medications, past family history, past medical history, past social history, past surgical history and problem list      Review of Systems   Constitutional: Negative for fatigue and fever  HENT: Negative for trouble swallowing  Eyes: Negative for visual disturbance  Respiratory: Negative for cough and shortness of breath  Cardiovascular: Negative for chest pain and palpitations  Gastrointestinal: Negative for abdominal pain and blood in stool  Endocrine: Negative for cold intolerance and heat intolerance  Genitourinary: Negative for difficulty urinating and dysuria  Musculoskeletal: Positive for myalgias  Negative for gait problem  Skin: Negative for rash  Neurological: Negative for dizziness, syncope and headaches  Hematological: Negative for adenopathy  Psychiatric/Behavioral: Negative for behavioral problems  Objective:      /60 (BP Location: Left arm, Patient Position: Sitting, Cuff Size: Large)   Pulse 91   Temp 97 7 °F (36 5 °C) (Tympanic)   Resp 16   Ht 5' 4" (1 626 m)   Wt 106 kg (233 lb 3 2 oz)   SpO2 97%   BMI 40 03 kg/m²          Physical Exam  Vitals signs and nursing note reviewed  Constitutional:       Appearance: Normal appearance  HENT:      Head: Normocephalic and atraumatic  Right Ear: External ear normal       Left Ear: External ear normal    Eyes:      Conjunctiva/sclera: Conjunctivae normal    Neck:      Musculoskeletal: Normal range of motion  Cardiovascular:      Rate and Rhythm: Normal rate and regular rhythm  Heart sounds: Normal heart sounds     Pulmonary:      Effort: Pulmonary effort is normal       Breath sounds: Normal breath sounds  Musculoskeletal: Normal range of motion  Skin:     General: Skin is warm and dry  Neurological:      Mental Status: She is alert and oriented to person, place, and time  Cranial Nerves: No cranial nerve deficit  Psychiatric:         Mood and Affect: Mood normal          Behavior: Behavior normal        BMI Counseling: Body mass index is 40 03 kg/m²  The BMI is above normal  Nutrition recommendations include decreasing portion sizes, encouraging healthy choices of fruits and vegetables, decreasing fast food intake and reducing intake of cholesterol  Exercise recommendations include moderate physical activity 150 minutes/week and exercising 3-5 times per week

## 2021-04-21 NOTE — ASSESSMENT & PLAN NOTE
-  Not well controlled  -  Discontinue terbinafine for now  -  Advised patient to monitor for improvement of muscle cramps  If not improving, contact office

## 2021-04-21 NOTE — ASSESSMENT & PLAN NOTE
-  Well controlled  -  Discussed low carb diet and daily exercise  -   Continue same  Will continue to monitor    Lab Results   Component Value Date    HGBA1C 6 0 (H) 04/07/2021

## 2021-05-07 DIAGNOSIS — E11.9 TYPE 2 DIABETES MELLITUS WITHOUT COMPLICATION, WITHOUT LONG-TERM CURRENT USE OF INSULIN (HCC): ICD-10-CM

## 2021-05-07 NOTE — TELEPHONE ENCOUNTER
Pt lm req refill on her synjardy 25/1000 mg daily   She was last seen 4/21/21 and refill was sent to provider for approval

## 2021-05-08 RX ORDER — EMPAGLIFLOZIN, METFORMIN HYDROCHLORIDE 25; 1000 MG/1; MG/1
1 TABLET, EXTENDED RELEASE ORAL DAILY
Qty: 90 TABLET | Refills: 1 | Status: SHIPPED | OUTPATIENT
Start: 2021-05-08 | End: 2021-11-05 | Stop reason: SDUPTHER

## 2021-07-09 DIAGNOSIS — E11.9 TYPE 2 DIABETES MELLITUS WITHOUT COMPLICATION, WITHOUT LONG-TERM CURRENT USE OF INSULIN (HCC): ICD-10-CM

## 2021-07-09 RX ORDER — REPAGLINIDE 1 MG/1
TABLET ORAL
Qty: 270 TABLET | Refills: 1 | Status: SHIPPED | OUTPATIENT
Start: 2021-07-09

## 2021-07-14 DIAGNOSIS — K22.70 BARRETT'S ESOPHAGUS WITHOUT DYSPLASIA: ICD-10-CM

## 2021-07-14 RX ORDER — OMEPRAZOLE 40 MG/1
40 CAPSULE, DELAYED RELEASE ORAL DAILY
Qty: 90 CAPSULE | Refills: 0 | OUTPATIENT
Start: 2021-07-14

## 2021-07-14 RX ORDER — OMEPRAZOLE 40 MG/1
40 CAPSULE, DELAYED RELEASE ORAL DAILY
Qty: 90 CAPSULE | Refills: 3 | Status: SHIPPED | OUTPATIENT
Start: 2021-07-14 | End: 2022-07-20 | Stop reason: SDUPTHER

## 2021-07-14 NOTE — TELEPHONE ENCOUNTER
GI Physician: Dr Hung Huddleston for Medication: Omeprazole    Dose: 40 mg    Quantity: 90  Pharmacy and Location: [place] [id number]

## 2021-07-14 NOTE — TELEPHONE ENCOUNTER
Pt of Dr Melanie Weinberg and Nancy Grayson PA-C last seen 1/27/2021  HX: kevin  Pt requesting refill of omeprazole 40mg

## 2021-09-08 ENCOUNTER — APPOINTMENT (OUTPATIENT)
Dept: LAB | Facility: IMAGING CENTER | Age: 51
End: 2021-09-08
Payer: COMMERCIAL

## 2021-09-08 DIAGNOSIS — B35.1 TINEA UNGUIUM: ICD-10-CM

## 2021-09-08 LAB
ALBUMIN SERPL BCP-MCNC: 4 G/DL (ref 3.5–5)
ALP SERPL-CCNC: 75 U/L (ref 46–116)
ALT SERPL W P-5'-P-CCNC: 31 U/L (ref 12–78)
AST SERPL W P-5'-P-CCNC: 20 U/L (ref 5–45)
BILIRUB DIRECT SERPL-MCNC: 0.09 MG/DL (ref 0–0.2)
BILIRUB SERPL-MCNC: 0.6 MG/DL (ref 0.2–1)
ERYTHROCYTE [DISTWIDTH] IN BLOOD BY AUTOMATED COUNT: 12.4 % (ref 11.6–15.1)
HCT VFR BLD AUTO: 43.9 % (ref 34.8–46.1)
HGB BLD-MCNC: 14.7 G/DL (ref 11.5–15.4)
MCH RBC QN AUTO: 31.1 PG (ref 26.8–34.3)
MCHC RBC AUTO-ENTMCNC: 33.5 G/DL (ref 31.4–37.4)
MCV RBC AUTO: 93 FL (ref 82–98)
PLATELET # BLD AUTO: 216 THOUSANDS/UL (ref 149–390)
PMV BLD AUTO: 13.7 FL (ref 8.9–12.7)
PROT SERPL-MCNC: 7.8 G/DL (ref 6.4–8.2)
RBC # BLD AUTO: 4.73 MILLION/UL (ref 3.81–5.12)
WBC # BLD AUTO: 7.15 THOUSAND/UL (ref 4.31–10.16)

## 2021-09-08 PROCEDURE — 85027 COMPLETE CBC AUTOMATED: CPT

## 2021-09-08 PROCEDURE — 80076 HEPATIC FUNCTION PANEL: CPT

## 2021-09-08 PROCEDURE — 36415 COLL VENOUS BLD VENIPUNCTURE: CPT

## 2021-09-15 ENCOUNTER — OFFICE VISIT (OUTPATIENT)
Dept: OBGYN CLINIC | Facility: CLINIC | Age: 51
End: 2021-09-15
Payer: COMMERCIAL

## 2021-09-15 VITALS
DIASTOLIC BLOOD PRESSURE: 68 MMHG | HEIGHT: 64 IN | WEIGHT: 233 LBS | BODY MASS INDEX: 39.78 KG/M2 | SYSTOLIC BLOOD PRESSURE: 93 MMHG | HEART RATE: 87 BPM

## 2021-09-15 DIAGNOSIS — M65.331 TRIGGER FINGER, RIGHT MIDDLE FINGER: ICD-10-CM

## 2021-09-15 DIAGNOSIS — M65.311 TRIGGER THUMB OF RIGHT HAND: ICD-10-CM

## 2021-09-15 DIAGNOSIS — G56.21 CUBITAL TUNNEL SYNDROME ON RIGHT: Primary | ICD-10-CM

## 2021-09-15 DIAGNOSIS — M25.641 STIFFNESS OF FINGER JOINT, RIGHT: ICD-10-CM

## 2021-09-15 DIAGNOSIS — M65.321 TRIGGER FINGER, RIGHT INDEX FINGER: ICD-10-CM

## 2021-09-15 PROCEDURE — 20550 NJX 1 TENDON SHEATH/LIGAMENT: CPT | Performed by: SURGERY

## 2021-09-15 PROCEDURE — 99204 OFFICE O/P NEW MOD 45 MIN: CPT | Performed by: SURGERY

## 2021-09-15 RX ORDER — LIDOCAINE HYDROCHLORIDE 10 MG/ML
1 INJECTION, SOLUTION INFILTRATION; PERINEURAL
Status: COMPLETED | OUTPATIENT
Start: 2021-09-15 | End: 2021-09-15

## 2021-09-15 RX ORDER — DEXAMETHASONE SODIUM PHOSPHATE 100 MG/10ML
40 INJECTION INTRAMUSCULAR; INTRAVENOUS
Status: COMPLETED | OUTPATIENT
Start: 2021-09-15 | End: 2021-09-15

## 2021-09-15 RX ORDER — METHYLPREDNISOLONE 4 MG/1
TABLET ORAL
Qty: 1 EACH | Refills: 1 | Status: SHIPPED | OUTPATIENT
Start: 2021-09-15 | End: 2022-02-09 | Stop reason: ALTCHOICE

## 2021-09-15 RX ADMIN — LIDOCAINE HYDROCHLORIDE 1 ML: 10 INJECTION, SOLUTION INFILTRATION; PERINEURAL at 14:21

## 2021-09-15 RX ADMIN — DEXAMETHASONE SODIUM PHOSPHATE 40 MG: 100 INJECTION INTRAMUSCULAR; INTRAVENOUS at 14:21

## 2021-09-15 NOTE — PROGRESS NOTES
Andrea MAHER  Attending, Orthopaedic Surgery  Hand, Wrist, and Elbow Surgery  [facility]      ORTHOPAEDIC HAND, WRIST, AND ELBOW OFFICE  VISIT       ASSESSMENT/PLAN:       49-year-old female here for her right trigger thumb, index, long  Operative treatments were discussed with the patient include a localized cortisone injections into each A1 pulley  Patient wished to proceed with the injections today, which she tolerated well  The risks of diabetes were discussed with the patient  A referral to Hand therapy displace to help with the range of motion of the fingers due to stiffness  A Medrol Dosepak was sent to the patient's confirm pharmacy  An ultrasound with cortisone injection for the right cubital tunnel syndrome was order  We will follow up with patient 6 weeks for re-evaluation  The patient verbalized understanding of exam findings and treatment plan  We engaged in the shared decision-making process and treatment options were discussed at length with the patient  Surgical and conservative management discussed today along with risks and benefits  Diagnoses and all orders for this visit:    Cubital tunnel syndrome on right  -     US MSK complete; Future    Stiffness of finger joint, right  -     Ambulatory referral to PT/OT hand therapy;  Future  -     methylPREDNISolone 4 MG tablet therapy pack; Use as directed on package    Trigger thumb of right hand  -     methylPREDNISolone 4 MG tablet therapy pack; Use as directed on package    Trigger finger, right index finger  -     methylPREDNISolone 4 MG tablet therapy pack; Use as directed on package    Trigger finger, right middle finger  -     methylPREDNISolone 4 MG tablet therapy pack; Use as directed on package    Other orders  -     Hand/upper extremity injection: R long A1  -     Hand/upper extremity injection: R index A1  -     Cancel: Small joint arthrocentesis  -     Hand/upper extremity injection: R thumb A1        Follow Up:  Return in about 6 weeks (around 10/27/2021) for right trigger fingers  To Do Next Visit:  Re-evaluation of current issue      General Discussions:  Cubital Tunnel Syndrome: The anatomy and physiology of cubital tunnel syndrome were discussed with the patient today in the office  Typically, increased elbow flexion activities decrease blood flow within the intraneural spaces, resulting in a feeling of numbness, tingling, weakness, or clumsiness within the hand and fingers  Occasionally, anatomic structures such as medial elbow osteophytes, the medial head of the triceps, were subluxing ulnar nerve may result in increased pressure or aggravation at the cubital tunnel  Typical signs and symptoms usually include numbness and tingling within the ring and small finger, weakness with , and weakness with pinch  Conservative treatment and includes nocturnal bracing to keep the elbow in a semi-extended position, activity modification, therapy, and avoiding excessive elbow flexion activities  Vitamin B6 one tablet daily over the counter may helpful to reduce symptoms  A majority of patients typically respond to conservative treatment over a period of approximately 3-6 months  EMG/NCV testing of the ulnar nerve at the elbow is not as reliable as carpal tunnel syndrome  Surgical intervention in the form of in situ release of the ulnar nerve at the elbow or ulnar nerve transposition may be required in up to 20% of patients  Trigger Finger: The anatomy and physiology of trigger finger was discussed with the patient today in the office  Edema and increased contact pressure within the flexor tendons at the A1 pulley can cause pain, crepitation, and triggering or locking of the digit resulting in limitation of function  Symptoms can occur at anytime but are typically worse in the morning or after a brief rest from repetitive activity    Treatment options include resting/nighttime MP blocking splints to decrease edema, oral anti-inflammatory medications, home or formal therapy exercises, up to 2 steroid injections within the tendon sheath, or surgical release  While majority of patients do respond to conservative treatment, up to 20% may require surgical release      ____________________________________________________________________________________________________________________________________________      CHIEF COMPLAINT:  Chief Complaint   Patient presents with    Right Thumb - Triggering, Pain    Right Index Finger - Triggering, Pain    Right Middle Finger - Triggering, Pain       SUBJECTIVE:  Duglas Alonso is a 44y o  year old RHD female who presents  Today with trigger fingers of the right index, long and thumb  Patient states it has been going on for couple of months  She notes that she has clicking and stiffness worse in the long finger  She has never had treatments for the trigger fingers  She also has a history of cubital tunnel on the right        Pain/symptom timing:  Worse during the day when active  Pain/symptom context:  Worse with activites and work  Pain/symptom modifying factors:  Rest makes better, activities make worse  Pain/symptom associated signs/symptoms: none    Prior treatment   · NSAIDsNo   · Injections No   · Bracing/Orthotics Yes    Physical Therapy No     I have personally reviewed all the relevant PMH, PSH, SH, FH, Medications and allergies      PAST MEDICAL HISTORY:  Past Medical History:   Diagnosis Date    Asthma     exercise induced    Colon polyp     COVID-19     Diabetes mellitus ([facility] [address] )     Encounter for screening for malignant neoplasm of colon 5/7/2019    GERD (gastroesophageal reflux disease)     Psychiatric disorder     depression       PAST SURGICAL HISTORY:  Past Surgical History:   Procedure Laterality Date    COLONOSCOPY      UPPER GASTROINTESTINAL ENDOSCOPY         FAMILY HISTORY:  Family History   Problem Relation Age of Onset    Hypertension Mother     Diabetes type II Mother     No Known Problems Father     Breast cancer Sister 40    BRCA1 Negative Sister     BRCA2 Negative Sister     Breast cancer Maternal Aunt         age unknown    Breast cancer Maternal Aunt         age unknown   [facility] Breast cancer Maternal Aunt         age unknown    No Known Problems Maternal Grandmother     No Known Problems Maternal Grandfather     No Known Problems Paternal Grandmother     No Known Problems Paternal Grandfather     No Known Problems Son     No Known Problems Sister     No Known Problems Sister        SOCIAL HISTORY:  Social History     Tobacco Use    Smoking status: Former Smoker     Quit date: 1994     Years since quittin 7    Smokeless tobacco: Never Used    Tobacco comment: No passive smoke exposure   Vaping Use    Vaping Use: Never used   Substance Use Topics    Alcohol use: Never    Drug use: Never       MEDICATIONS:    Current Outpatient Medications:     albuterol (PROVENTIL HFA,VENTOLIN HFA) 90 mcg/act inhaler, Inhale 2 puffs every 6 (six) hours as needed for wheezing or shortness of breath, Disp: 1 Inhaler, Rfl: 5    cyclobenzaprine (FLEXERIL) 10 mg tablet, Take 1 tablet (10 mg total) by mouth 3 (three) times a day as needed for muscle spasms, Disp: 30 tablet, Rfl: 0    Empagliflozin-metFORMIN HCl ER (Synjardy XR)  MG TB24, Take 1 tablet by mouth daily, Disp: 90 tablet, Rfl: 1    fluticasone (FLONASE) 50 mcg/act nasal spray, 2 sprays into each nostril daily, Disp: 16 g, Rfl: 3    lisinopril (ZESTRIL) 10 mg tablet, TAKE 1 TABLET BY MOUTH EVERY DAY, Disp: 90 tablet, Rfl: 1    norethindrone (MICRONOR) 0 35 MG tablet, Take 1 tablet by mouth daily, Disp: , Rfl: 3    omeprazole (PriLOSEC) 40 MG capsule, Take 1 capsule (40 mg total) by mouth daily, Disp: 90 capsule, Rfl: 3    QUEtiapine (SEROQUEL) 200 mg tablet, Take 150 mg by mouth daily at bedtime , Disp: , Rfl:     repaglinide (PRANDIN) 1 mg tablet, TAKE 1 TABLET BY MOUTH 3 TIMES A DAY BEFORE MEALS, Disp: 270 tablet, Rfl: 1    rosuvastatin (CRESTOR) 10 MG tablet, Take 1 tablet (10 mg total) by mouth daily, Disp: 90 tablet, Rfl: 0    sertraline (ZOLOFT) 50 mg tablet, Take 50 mg by mouth daily, Disp: , Rfl: 0    Dapagliflozin-metFORMIN HCl ER (XIGDUO XR)  MG TB24, Take 1 tablet by mouth daily with breakfast (Patient not taking: Reported on 11/1/2019), Disp: 90 tablet, Rfl: 0    Na Sulfate-K Sulfate-Mg Sulf (SUPREP BOWEL PREP KIT) 17 5-3 13-1 6 GM/177ML SOLN, Take 1 Bottle by mouth once for 1 dose, Disp: 1 Bottle, Rfl: 0    ALLERGIES:  Allergies   Allergen Reactions    Cephalosporins Hives    Amoxicillin Hives           REVIEW OF SYSTEMS:  Review of Systems   Constitutional: Negative for chills, fever and unexpected weight change  HENT: Negative for hearing loss, nosebleeds and sore throat  Eyes: Negative for pain, redness and visual disturbance  Respiratory: Negative for cough, shortness of breath and wheezing  Cardiovascular: Negative for chest pain, palpitations and leg swelling  Gastrointestinal: Negative for abdominal pain and nausea  Genitourinary: Negative for dyspareunia, dysuria and frequency  Skin: Negative for rash and wound  Neurological: Negative for dizziness, numbness and headaches  Psychiatric/Behavioral: Negative for decreased concentration and suicidal ideas  The patient is not nervous/anxious          VITALS:  Vitals:    09/15/21 1402   BP: 93/68   Pulse: 87       LABS:  HgA1c:   Lab Results   Component Value Date    HGBA1C 6 0 (H) 04/07/2021     BMP:   Lab Results   Component Value Date    CALCIUM 9 5 04/07/2021    K 4 4 04/07/2021    CO2 24 04/07/2021     04/07/2021    BUN 14 04/07/2021    CREATININE 0 62 04/07/2021       _____________________________________________________  PHYSICAL EXAMINATION:  General: well developed and well nourished, alert, oriented times 3 and appears comfortable  Psychiatric: Normal  HEENT: Normocephalic, Atraumatic Trachea Midline, No torticollis  Pulmonary: No audible wheezing or respiratory distress   Abdomen/GI: Non tender, non distended   Cardiovascular: No pitting edema, 2+ radial pulse   Skin: No masses, erythema, lacerations, fluctation, ulcerations  Neurovascular: Sensation intact to the Median Nerve, Sensation Intact to the Radial Nerve, Decreased Sensation to  the Ulnar Nerve, Motor Intact to the Median, Ulnar, Radial Nerve and Pulses Intact  Musculoskeletal: Normal, except as noted in detailed exam and in HPI  MUSCULOSKELETAL EXAMINATION:    right long finger:  Positive palpable nodule over the A1 pulley  Positive tenderness to palpation over A1 pulley  Negative catching  Negative clicking  right index finger:  Positive palpable nodule over the A1 pulley  Positive tenderness to palpation over A1 pulley  Positive catching  Positive clicking  right thumb finger:  Positive palpable nodule over the A1 pulley  Negative tenderness to palpation over A1 pulley  Negative catching  Negative clicking       +   Tinel's the right elbow  ___________________________________________________  STUDIES REVIEWED:  No images reviewed at today's visit      PROCEDURES PERFORMED:  Hand/upper extremity injection: R long A1  Universal Protocol:  Consent: Verbal consent obtained  Risks and benefits: risks, benefits and alternatives were discussed  Consent given by: patient  Time out: Immediately prior to procedure a "time out" was called to verify the correct patient, procedure, equipment, support staff and site/side marked as required    Timeout called at: 9/15/2021 2:17 PM   Patient understanding: patient states understanding of the procedure being performed  Site marked: the operative site was marked  Patient identity confirmed: verbally with patient    Supporting Documentation  Indications: tendon swelling and pain   Procedure Details  Condition:trigger finger Location: long finger - R long A1   Preparation: Patient was prepped and draped in the usual sterile fashion  Needle size: 25 G  Ultrasound guidance: no  Approach: volar  Medications administered: 1 mL lidocaine 1 %; 40 mg dexamethasone 100 mg/10 mL    Patient tolerance: patient tolerated the procedure well with no immediate complications  Dressing:  Sterile dressing applied     Hand/upper extremity injection: R index A1  Universal Protocol:  Consent: Verbal consent obtained  Risks and benefits: risks, benefits and alternatives were discussed  Consent given by: patient  Time out: Immediately prior to procedure a "time out" was called to verify the correct patient, procedure, equipment, support staff and site/side marked as required  Timeout called at: 9/15/2021 2:17 PM   Patient understanding: patient states understanding of the procedure being performed  Site marked: the operative site was marked  Patient identity confirmed: verbally with patient    Supporting Documentation  Indications: tendon swelling and pain   Procedure Details  Condition:trigger finger Location: index finger - R index A1   Preparation: Patient was prepped and draped in the usual sterile fashion  Needle size: 25 G  Ultrasound guidance: no  Approach: volar  Medications administered: 1 mL lidocaine 1 %; 40 mg dexamethasone 100 mg/10 mL    Patient tolerance: patient tolerated the procedure well with no immediate complications  Dressing:  Sterile dressing applied     Hand/upper extremity injection: R thumb A1  Universal Protocol:  Consent: Verbal consent obtained  Risks and benefits: risks, benefits and alternatives were discussed  Consent given by: patient  Time out: Immediately prior to procedure a "time out" was called to verify the correct patient, procedure, equipment, support staff and site/side marked as required    Timeout called at: 9/15/2021 2:19 PM   Patient understanding: patient states understanding of the procedure being performed  Site marked: the operative site was marked  Patient identity confirmed: verbally with patient    Supporting Documentation  Indications: tendon swelling and pain   Procedure Details  Condition:trigger finger Location: thumb - R thumb A1   Preparation: Patient was prepped and draped in the usual sterile fashion  Needle size: 25 G  Ultrasound guidance: no  Approach: volar  Medications administered: 1 mL lidocaine 1 %; 40 mg dexamethasone 100 mg/10 mL    Patient tolerance: patient tolerated the procedure well with no immediate complications  Dressing:  Sterile dressing applied           _____________________________________________________      Scribe Attestation    I,:  Rose Marie Roldan am acting as a scribe while in the presence of the attending physician :       I,:  Carla Ely MD personally performed the services described in this documentation    as scribed in my presence :

## 2021-09-23 ENCOUNTER — HOSPITAL ENCOUNTER (EMERGENCY)
Facility: HOSPITAL | Age: 51
Discharge: HOME | End: 2021-09-23
Attending: EMERGENCY MEDICINE
Payer: COMMERCIAL

## 2021-09-23 VITALS
BODY MASS INDEX: 39.99 KG/M2 | SYSTOLIC BLOOD PRESSURE: 117 MMHG | TEMPERATURE: 98.2 F | HEART RATE: 101 BPM | OXYGEN SATURATION: 95 % | RESPIRATION RATE: 16 BRPM | WEIGHT: 233 LBS | DIASTOLIC BLOOD PRESSURE: 72 MMHG

## 2021-09-23 DIAGNOSIS — R53.81 MALAISE: Primary | ICD-10-CM

## 2021-09-23 LAB — SARS-COV-2 RNA RESP QL NAA+PROBE: NEGATIVE

## 2021-09-23 PROCEDURE — U0003 INFECTIOUS AGENT DETECTION BY NUCLEIC ACID (DNA OR RNA); SEVERE ACUTE RESPIRATORY SYNDROME CORONAVIRUS 2 (SARS-COV-2) (CORONAVIRUS DISEASE [COVID-19]), AMPLIFIED PROBE TECHNIQUE, MAKING USE OF HIGH THROUGHPUT TECHNOLOGIES AS DESCRIBED BY CMS-2020-01-R: HCPCS | Performed by: EMERGENCY MEDICINE

## 2021-09-23 PROCEDURE — 99282 EMERGENCY DEPT VISIT SF MDM: CPT | Performed by: EMERGENCY MEDICINE

## 2021-09-23 PROCEDURE — U0005 INFEC AGEN DETEC AMPLI PROBE: HCPCS | Performed by: EMERGENCY MEDICINE

## 2021-09-23 PROCEDURE — 99283 EMERGENCY DEPT VISIT LOW MDM: CPT

## 2021-09-23 NOTE — ED PROVIDER NOTES
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 Taking?    Dapagliflozin-metFORMIN HCl ER (XIGDUO XR)  MG TB24  Self No No   Sig: Take 1 tablet by mouth daily with breakfast   Patient not taking: Reported on 2019   Empagliflozin-metFORMIN HCl ER (Synjardy XR)  MG TB24   No No   Sig: Take 1 tablet by mouth daily   Na Sulfate-K Sulfate-Mg Sulf (SUPREP BOWEL PREP KIT) 17 5-3 13-1 6 GM/177ML SOLN   No No   Sig: Take 1 Bottle by mouth once for 1 dose   QUEtiapine (SEROQUEL) 200 mg tablet  Self Yes No   Sig: Take 150 mg by mouth daily at bedtime    albuterol (PROVENTIL HFA,VENTOLIN HFA) 90 mcg/act inhaler  Self No No   Sig: Inhale 2 puffs every 6 (six) hours as needed for wheezing or shortness of breath   cyclobenzaprine (FLEXERIL) 10 mg tablet   No No   Sig: Take 1 tablet (10 mg total) by mouth 3 (three) times a day as needed for muscle spasms   fluticasone (FLONASE) 50 mcg/act nasal spray  Self No No   Si sprays into each nostril daily   lisinopril (ZESTRIL) 10 mg tablet   No No   Sig: TAKE 1 TABLET BY MOUTH EVERY DAY   methylPREDNISolone 4 MG tablet therapy pack   No No Sig: Use as directed on package   norethindrone (MICRONOR) 0 35 MG tablet  Self Yes No   Sig: Take 1 tablet by mouth daily   omeprazole (PriLOSEC) 40 MG capsule   No No   Sig: Take 1 capsule (40 mg total) by mouth daily   repaglinide (PRANDIN) 1 mg tablet   No No   Sig: TAKE 1 TABLET BY MOUTH 3 TIMES A DAY BEFORE MEALS   rosuvastatin (CRESTOR) 10 MG tablet   No No   Sig: Take 1 tablet (10 mg total) by mouth daily   sertraline (ZOLOFT) 50 mg tablet  Self Yes No   Sig: Take 50 mg by mouth daily      Facility-Administered Medications: None       Past Medical History:   Diagnosis Date    Asthma     exercise induced    Colon polyp     COVID-19     Diabetes mellitus ([facility] [place] [address] )     Encounter for screening for malignant neoplasm of colon 2019    GERD (gastroesophageal reflux disease)     Psychiatric disorder     depression       Past Surgical History:   Procedure Laterality Date    COLONOSCOPY      UPPER GASTROINTESTINAL ENDOSCOPY         Family History   Problem Relation Age of Onset    Hypertension Mother     Diabetes type II Mother     No Known Problems Father     Breast cancer Sister 40    BRCA1 Negative Sister     BRCA2 Negative Sister     Breast cancer Maternal Aunt         age unknown   Guy Ankita Breast cancer Maternal Aunt         age unknown   Guy Ankita Breast cancer Maternal Aunt         age unknown    No Known Problems Maternal Grandmother     No Known Problems Maternal Grandfather     No Known Problems Paternal Grandmother     No Known Problems Paternal Grandfather     No Known Problems Son     No Known Problems Sister     No Known Problems Sister      I have reviewed and agree with the history as documented      E-Cigarette/Vaping    E-Cigarette Use Never User      E-Cigarette/Vaping Substances    Nicotine No     THC No     CBD No     Flavoring No     Other No     Unknown No      Social History     Tobacco Use    Smoking status: Former Smoker     Quit date: 1994     Years since quittin 7  Smokeless tobacco: Never Used    Tobacco comment: No passive smoke exposure   Vaping Use    Vaping Use: Never used   Substance Use Topics    Alcohol use: Never    Drug use: Never       Review of Systems   Constitutional: Negative for chills, diaphoresis and fever  HENT: Negative for congestion and sore throat  Respiratory: Negative for cough, shortness of breath, wheezing and stridor  Cardiovascular: Negative for chest pain, palpitations and leg swelling  Gastrointestinal: Negative for abdominal pain, blood in stool, diarrhea, nausea and vomiting  Genitourinary: Negative for dysuria, frequency and urgency  Musculoskeletal: Negative for neck pain and neck stiffness  Skin: Negative for pallor and rash  Neurological: Negative for dizziness, syncope, weakness and light-headedness  All other systems reviewed and are negative  Physical Exam  Physical Exam  Vitals reviewed  Constitutional:       Appearance: Normal appearance  She is well-developed  HENT:      Head: Normocephalic and atraumatic  Eyes:      Extraocular Movements: Extraocular movements intact  Pupils: Pupils are equal, round, and reactive to light  Cardiovascular:      Rate and Rhythm: Normal rate and regular rhythm  Heart sounds: Normal heart sounds  Pulmonary:      Effort: Pulmonary effort is normal  No respiratory distress  Breath sounds: Normal breath sounds  Abdominal:      General: Bowel sounds are normal       Palpations: Abdomen is soft  Tenderness: There is no abdominal tenderness  Musculoskeletal:         General: No deformity or signs of injury  Normal range of motion  Cervical back: Normal range of motion and neck supple  Skin:     General: Skin is warm and dry  Capillary Refill: Capillary refill takes less than 2 seconds  Neurological:      General: No focal deficit present  Mental Status: She is alert and oriented to person, place, and time           Vital Signs  ED Triage Vitals [09/23/21 0728]   Temperature Pulse Respirations Blood Pressure SpO2   98 2 °F (36 8 °C) 101 16 117/72 95 %      Temp Source Heart Rate Source Patient Position - Orthostatic VS BP Location FiO2 (%)   Oral Monitor Sitting Right arm --      Pain Score       --           Vitals:    09/23/21 0728   BP: 117/72   Pulse: 101   Patient Position - Orthostatic VS: Sitting         Visual Acuity      ED Medications  Medications - No data to display    Diagnostic Studies  Results Reviewed     Procedure Component Value Units Date/Time    Novel Coronavirus (Covid-19),PCR [facility] - 2 Hour Stat [[id number]]  (Normal) Collected: 09/23/21 0746    Lab Status: Final result Specimen: Nares from Nose Updated: 09/23/21 0943     SARS-CoV-2 Negative    Narrative:      FOR PEDIATRIC PATIENTS - copy/paste COVID Guidelines URL to browser: [email or web address]  [email or web address]    The specimen collection materials, transport medium, and/or testing methodology utilized in the production of these test results have been proven to be reliable in a limited validation with an abbreviated program under the Emergency Utilization Authorization provided by the FDA  Testing reported as "Presumptive positive" will be confirmed with secondary testing to ensure result accuracy  Clinical caution and judgement should be used with the interpretation of these results with consideration of the clinical impression and other laboratory testing  Testing reported as "Positive" or "Negative" has been proven to be accurate according to standard laboratory validation requirements  All testing is performed with control materials showing appropriate reactivity at standard intervals                   No orders to display              Procedures  Procedures         ED Course                                           MDM    Disposition  Final diagnoses:   Malaise     Time reflects when diagnosis was documented in both MDM as applicable and the Disposition within this note     Time User Action Codes Description Comment    9/23/2021  7:39 AM Jarod Hirsch Rubio [[id number]] [facility] [place] by [facility]       ED Disposition     ED Disposition Condition Date/Time Comment    Discharge Stable Thu Sep 23, 2021  7:39 AM Gabriellechepe Yanni discharge to home/self care              Follow-up Information     Follow up With Specialties Details Why Contact Info Additional Information    Thiago Billingsley MD Family Medicine Call  Please call and follow-up with family doctor [phone]  [place] [phone]       [place] [address] Emergency Department Emergency Medicine  As needed, If symptoms worsen [address] [facility] [address] [facility] Emergency Department, [address], [place], [place], [address]          Discharge Medication List as of 9/23/2021  7:40 AM      CONTINUE these medications which have NOT CHANGED    Details   albuterol (PROVENTIL HFA,VENTOLIN HFA) 90 mcg/act inhaler Inhale 2 puffs every 6 (six) hours as needed for wheezing or shortness of breath, Starting Fri 10/16/2020, Normal      cyclobenzaprine (FLEXERIL) 10 mg tablet Take 1 tablet (10 mg total) by mouth 3 (three) times a day as needed for muscle spasms, Starting Wed 4/21/2021, Normal      Dapagliflozin-metFORMIN HCl ER (XIGDUO XR)  MG TB24 Take 1 tablet by mouth daily with breakfast, Starting Wed 10/9/2019, Normal      Empagliflozin-metFORMIN HCl ER (Synjardy XR)  MG TB24 Take 1 tablet by mouth daily, Starting Sat 5/8/2021, Normal      fluticasone (FLONASE) 50 mcg/act nasal spray 2 sprays into each nostril daily, Starting Tue 10/27/2020, Normal      lisinopril (ZESTRIL) 10 mg tablet TAKE 1 TABLET BY MOUTH EVERY DAY, Normal      methylPREDNISolone 4 MG tablet therapy pack Use as directed on package, Normal      Na Sulfate-K Sulfate-Mg Sulf (SUPREP BOWEL PREP KIT) 17 5-3 13-1 6 GM/177ML SOLN Take 1 Bottle by mouth once for 1 dose, Starting Fri 6/28/2019, Normal      norethindrone (MICRONOR) 0 35 MG tablet Take 1 tablet by mouth daily, Starting Wed 5/1/2019, Historical Med      omeprazole (PriLOSEC) 40 MG capsule Take 1 capsule (40 mg total) by mouth daily, Starting Wed 7/14/2021, Normal      QUEtiapine (SEROQUEL) 200 mg tablet Take 150 mg by mouth daily at bedtime , Historical Med      repaglinide (PRANDIN) 1 mg tablet TAKE 1 TABLET BY MOUTH 3 TIMES A DAY BEFORE MEALS, Normal      rosuvastatin (CRESTOR) 10 MG tablet Take 1 tablet (10 mg total) by mouth daily, Starting Thu 4/8/2021, Normal      sertraline (ZOLOFT) 50 mg tablet Take 50 mg by mouth daily, Starting Tue 5/14/2019, Historical Med           No discharge procedures on file      PDMP Review     None          ED Provider  Electronically Signed by           Karen Torres MD  09/23/21 4931

## 2021-09-28 ENCOUNTER — OFFICE VISIT (OUTPATIENT)
Dept: FAMILY MEDICINE CLINIC | Facility: CLINIC | Age: 51
End: 2021-09-28
Payer: COMMERCIAL

## 2021-09-28 VITALS
OXYGEN SATURATION: 97 % | HEART RATE: 85 BPM | HEIGHT: 64 IN | DIASTOLIC BLOOD PRESSURE: 60 MMHG | TEMPERATURE: 97.5 F | WEIGHT: 223.5 LBS | SYSTOLIC BLOOD PRESSURE: 116 MMHG | RESPIRATION RATE: 16 BRPM | BODY MASS INDEX: 38.16 KG/M2

## 2021-09-28 DIAGNOSIS — E78.5 HYPERLIPIDEMIA, UNSPECIFIED HYPERLIPIDEMIA TYPE: ICD-10-CM

## 2021-09-28 DIAGNOSIS — R19.7 DIARRHEA, UNSPECIFIED TYPE: ICD-10-CM

## 2021-09-28 DIAGNOSIS — E11.9 TYPE 2 DIABETES MELLITUS WITHOUT COMPLICATION, WITHOUT LONG-TERM CURRENT USE OF INSULIN (HCC): ICD-10-CM

## 2021-09-28 DIAGNOSIS — R53.83 FATIGUE, UNSPECIFIED TYPE: ICD-10-CM

## 2021-09-28 DIAGNOSIS — Z00.00 HEALTHCARE MAINTENANCE: Primary | ICD-10-CM

## 2021-09-28 DIAGNOSIS — I10 ESSENTIAL HYPERTENSION: ICD-10-CM

## 2021-09-28 PROCEDURE — 3008F BODY MASS INDEX DOCD: CPT | Performed by: NURSE PRACTITIONER

## 2021-09-28 PROCEDURE — 1036F TOBACCO NON-USER: CPT | Performed by: NURSE PRACTITIONER

## 2021-09-28 PROCEDURE — 3074F SYST BP LT 130 MM HG: CPT | Performed by: NURSE PRACTITIONER

## 2021-09-28 PROCEDURE — 3078F DIAST BP <80 MM HG: CPT | Performed by: NURSE PRACTITIONER

## 2021-09-28 PROCEDURE — 4010F ACE/ARB THERAPY RXD/TAKEN: CPT | Performed by: NURSE PRACTITIONER

## 2021-09-28 PROCEDURE — 99396 PREV VISIT EST AGE 40-64: CPT | Performed by: NURSE PRACTITIONER

## 2021-09-28 RX ORDER — LISINOPRIL 10 MG/1
10 TABLET ORAL DAILY
Qty: 90 TABLET | Refills: 0 | Status: SHIPPED | OUTPATIENT
Start: 2021-09-28 | End: 2022-01-27 | Stop reason: SDUPTHER

## 2021-09-28 RX ORDER — ROSUVASTATIN CALCIUM 10 MG/1
10 TABLET, COATED ORAL DAILY
Qty: 90 TABLET | Refills: 0 | Status: SHIPPED | OUTPATIENT
Start: 2021-09-28 | End: 2022-01-27 | Stop reason: SDUPTHER

## 2021-09-28 NOTE — ASSESSMENT & PLAN NOTE
- Likely viral illness  - Could also be related to recent course of prednisone    - Advised to increase oral hydration    - Increase dietary fiber  Incorporate BRAT diet  - If symptoms do not resolve in 5-7 days, contact office

## 2021-09-28 NOTE — PROGRESS NOTES
Assessment/Plan:    Type 2 diabetes mellitus without complication (HCC)  - Currently well controlled  - Continue Synjardy   - Discussed low carb diet and regular exercise  - Will continue to monitor A1C  Lab Results   Component Value Date    HGBA1C 6 0 (H) 04/07/2021       Essential hypertension  - Well controlled  Continue same  Will continue to monitor  Fatigue  - Will check labs and continue to follow up with patient  Healthcare maintenance  - Discussed immunizations, screenings, healthy diet, exercise, and safety measures  - She is up to date with her dental, vision, and GYN exams   - She did receive the COVID vaccine  - She is up to date with her mammogram   - Routine blood work ordered  Will contact patient with results  Hyperlipidemia  - Discussed low fat diet and regular exercise  - Continue Crestor   - Will continue to monitor fasting lipid panel  Diarrhea  - Likely viral illness  - Could also be related to recent course of prednisone    - Advised to increase oral hydration    - Increase dietary fiber  Incorporate BRAT diet  - If symptoms do not resolve in 5-7 days, contact office  Diagnoses and all orders for this visit:    Healthcare maintenance  -     TSH, 3rd generation with Free T4 reflex; Future    Fatigue, unspecified type  -     CBC and differential; Future  -     Comprehensive metabolic panel; Future    Type 2 diabetes mellitus without complication, without long-term current use of insulin (HCC)  -     Hemoglobin A1C; Future    Essential hypertension  -     lisinopril (ZESTRIL) 10 mg tablet; Take 1 tablet (10 mg total) by mouth daily    Hyperlipidemia, unspecified hyperlipidemia type  Comments:  Continue Crestor  Will check lipid panel  Orders:  -     rosuvastatin (CRESTOR) 10 MG tablet; Take 1 tablet (10 mg total) by mouth daily  -     Lipid Panel with Direct LDL reflex;  Future    Diarrhea, unspecified type        Subjective:      Patient ID: Jose Franciscorosa Samuel is a 44 y o  female  Patient presents today for annual physical  She was seen in the ER on 9/23 with complaints of malaise  A COVID test was performed which was negative  She continues to complain of malaise, headache, diarrhea, and abdominal pain  She states that she has recently been eating a lot of vegetables from her garden  She noticed the other day that she was getting hot flashes and sweats  She did see her gynecologist recently who informed her it could possibly be related to menopause  She does have some continued diarrhea with associated abdominal pain since Thursday  She was on prednisone prescribed by her Orthopedic doctor for back pain  She denies any fevers, cough, or shortness of breath  The following portions of the patient's history were reviewed and updated as appropriate: allergies, current medications, past family history, past medical history, past social history, past surgical history and problem list     Review of Systems   Constitutional: Positive for fatigue  Negative for chills and fever  HENT: Positive for congestion  Negative for ear pain, sinus pressure, sinus pain and trouble swallowing  Eyes: Negative for visual disturbance  Respiratory: Negative for cough, chest tightness and shortness of breath  Cardiovascular: Negative for chest pain and palpitations  Gastrointestinal: Positive for abdominal pain and diarrhea  Negative for blood in stool  Endocrine: Negative for cold intolerance and heat intolerance  Genitourinary: Negative for difficulty urinating, dysuria and hematuria  Musculoskeletal: Negative for gait problem  Skin: Negative for rash  Neurological: Positive for headaches  Negative for dizziness and syncope  Hematological: Negative for adenopathy  Psychiatric/Behavioral: Negative for behavioral problems           Objective:      /60 (BP Location: Left arm, Patient Position: Sitting, Cuff Size: Large)   Pulse 85   Temp 97 5 °F (36 4 °C) (Tympanic)   Resp 16   Ht 5' 4" (1 626 m)   Wt 101 kg (223 lb 8 oz)   SpO2 97%   BMI 38 36 kg/m²          Physical Exam  Vitals and nursing note reviewed  Constitutional:       General: She is not in acute distress  Appearance: Normal appearance  HENT:      Head: Normocephalic and atraumatic  Right Ear: Tympanic membrane, ear canal and external ear normal       Left Ear: Tympanic membrane, ear canal and external ear normal       Nose: No congestion  Eyes:      Extraocular Movements: Extraocular movements intact  Conjunctiva/sclera: Conjunctivae normal       Pupils: Pupils are equal, round, and reactive to light  Cardiovascular:      Rate and Rhythm: Normal rate and regular rhythm  Heart sounds: Normal heart sounds  Pulmonary:      Effort: Pulmonary effort is normal       Breath sounds: Normal breath sounds  Abdominal:      General: Bowel sounds are normal       Palpations: Abdomen is soft  Musculoskeletal:         General: Normal range of motion  Cervical back: Normal range of motion  Right lower leg: No edema  Left lower leg: No edema  Lymphadenopathy:      Cervical: No cervical adenopathy  Skin:     General: Skin is warm and dry  Neurological:      Mental Status: She is alert and oriented to person, place, and time  Cranial Nerves: No cranial nerve deficit     Psychiatric:         Mood and Affect: Mood normal          Behavior: Behavior normal

## 2021-09-28 NOTE — ASSESSMENT & PLAN NOTE
- Discussed immunizations, screenings, healthy diet, exercise, and safety measures  - She is up to date with her dental, vision, and GYN exams   - She did receive the COVID vaccine  - She is up to date with her mammogram   - Routine blood work ordered  Will contact patient with results

## 2021-09-28 NOTE — ASSESSMENT & PLAN NOTE
- Currently well controlled  - Continue Synjardy   - Discussed low carb diet and regular exercise  - Will continue to monitor A1C    Lab Results   Component Value Date    HGBA1C 6 0 (H) 04/07/2021

## 2021-09-28 NOTE — ASSESSMENT & PLAN NOTE
- Discussed low fat diet and regular exercise  - Continue Crestor   - Will continue to monitor fasting lipid panel

## 2021-09-29 ENCOUNTER — APPOINTMENT (OUTPATIENT)
Dept: LAB | Facility: IMAGING CENTER | Age: 51
End: 2021-09-29
Payer: COMMERCIAL

## 2021-09-29 DIAGNOSIS — E78.5 HYPERLIPIDEMIA, UNSPECIFIED HYPERLIPIDEMIA TYPE: ICD-10-CM

## 2021-09-29 DIAGNOSIS — Z00.00 HEALTHCARE MAINTENANCE: ICD-10-CM

## 2021-09-29 DIAGNOSIS — R53.83 FATIGUE, UNSPECIFIED TYPE: ICD-10-CM

## 2021-09-29 DIAGNOSIS — E11.9 TYPE 2 DIABETES MELLITUS WITHOUT COMPLICATION, WITHOUT LONG-TERM CURRENT USE OF INSULIN (HCC): ICD-10-CM

## 2021-09-29 LAB
ALBUMIN SERPL BCP-MCNC: 3.3 G/DL (ref 3.5–5)
ALP SERPL-CCNC: 71 U/L (ref 46–116)
ALT SERPL W P-5'-P-CCNC: 28 U/L (ref 12–78)
ANION GAP SERPL CALCULATED.3IONS-SCNC: 5 MMOL/L (ref 4–13)
AST SERPL W P-5'-P-CCNC: 16 U/L (ref 5–45)
BASOPHILS # BLD AUTO: 0.05 THOUSANDS/ΜL (ref 0–0.1)
BASOPHILS NFR BLD AUTO: 1 % (ref 0–1)
BILIRUB SERPL-MCNC: 0.42 MG/DL (ref 0.2–1)
BUN SERPL-MCNC: 11 MG/DL (ref 5–25)
CALCIUM ALBUM COR SERPL-MCNC: 9.8 MG/DL (ref 8.3–10.1)
CALCIUM SERPL-MCNC: 9.2 MG/DL (ref 8.3–10.1)
CHLORIDE SERPL-SCNC: 107 MMOL/L (ref 100–108)
CHOLEST SERPL-MCNC: 153 MG/DL (ref 50–200)
CO2 SERPL-SCNC: 28 MMOL/L (ref 21–32)
CREAT SERPL-MCNC: 0.48 MG/DL (ref 0.6–1.3)
EOSINOPHIL # BLD AUTO: 0.09 THOUSAND/ΜL (ref 0–0.61)
EOSINOPHIL NFR BLD AUTO: 1 % (ref 0–6)
ERYTHROCYTE [DISTWIDTH] IN BLOOD BY AUTOMATED COUNT: 12.3 % (ref 11.6–15.1)
EST. AVERAGE GLUCOSE BLD GHB EST-MCNC: 131 MG/DL
GFR SERPL CREATININE-BSD FRML MDRD: 113 ML/MIN/1.73SQ M
GLUCOSE P FAST SERPL-MCNC: 98 MG/DL (ref 65–99)
HBA1C MFR BLD: 6.2 %
HCT VFR BLD AUTO: 41.9 % (ref 34.8–46.1)
HDLC SERPL-MCNC: 43 MG/DL
HGB BLD-MCNC: 13.9 G/DL (ref 11.5–15.4)
IMM GRANULOCYTES # BLD AUTO: 0.02 THOUSAND/UL (ref 0–0.2)
IMM GRANULOCYTES NFR BLD AUTO: 0 % (ref 0–2)
LDLC SERPL CALC-MCNC: 90 MG/DL (ref 0–100)
LYMPHOCYTES # BLD AUTO: 2.4 THOUSANDS/ΜL (ref 0.6–4.47)
LYMPHOCYTES NFR BLD AUTO: 31 % (ref 14–44)
MCH RBC QN AUTO: 30 PG (ref 26.8–34.3)
MCHC RBC AUTO-ENTMCNC: 33.2 G/DL (ref 31.4–37.4)
MCV RBC AUTO: 91 FL (ref 82–98)
MONOCYTES # BLD AUTO: 0.39 THOUSAND/ΜL (ref 0.17–1.22)
MONOCYTES NFR BLD AUTO: 5 % (ref 4–12)
NEUTROPHILS # BLD AUTO: 4.71 THOUSANDS/ΜL (ref 1.85–7.62)
NEUTS SEG NFR BLD AUTO: 62 % (ref 43–75)
NRBC BLD AUTO-RTO: 0 /100 WBCS
PLATELET # BLD AUTO: 207 THOUSANDS/UL (ref 149–390)
PMV BLD AUTO: 13 FL (ref 8.9–12.7)
POTASSIUM SERPL-SCNC: 3.8 MMOL/L (ref 3.5–5.3)
PROT SERPL-MCNC: 7 G/DL (ref 6.4–8.2)
RBC # BLD AUTO: 4.63 MILLION/UL (ref 3.81–5.12)
SODIUM SERPL-SCNC: 140 MMOL/L (ref 136–145)
TRIGL SERPL-MCNC: 100 MG/DL
TSH SERPL DL<=0.05 MIU/L-ACNC: 1.61 UIU/ML (ref 0.36–3.74)
WBC # BLD AUTO: 7.66 THOUSAND/UL (ref 4.31–10.16)

## 2021-09-29 PROCEDURE — 83036 HEMOGLOBIN GLYCOSYLATED A1C: CPT

## 2021-09-29 PROCEDURE — 80053 COMPREHEN METABOLIC PANEL: CPT

## 2021-09-29 PROCEDURE — 36415 COLL VENOUS BLD VENIPUNCTURE: CPT

## 2021-09-29 PROCEDURE — 80061 LIPID PANEL: CPT

## 2021-09-29 PROCEDURE — 85025 COMPLETE CBC W/AUTO DIFF WBC: CPT

## 2021-09-29 PROCEDURE — 84443 ASSAY THYROID STIM HORMONE: CPT

## 2021-09-29 PROCEDURE — 3044F HG A1C LEVEL LT 7.0%: CPT | Performed by: NURSE PRACTITIONER

## 2021-09-30 ENCOUNTER — TELEPHONE (OUTPATIENT)
Dept: FAMILY MEDICINE CLINIC | Facility: CLINIC | Age: 51
End: 2021-09-30

## 2021-09-30 NOTE — TELEPHONE ENCOUNTER
----- Message from [address] sent at 9/30/2021  7:04 AM EDT -----  - Cholesterol and A1C are well controlled  - The rest of her labs are stable

## 2021-11-05 DIAGNOSIS — E11.9 TYPE 2 DIABETES MELLITUS WITHOUT COMPLICATION, WITHOUT LONG-TERM CURRENT USE OF INSULIN (HCC): ICD-10-CM

## 2021-11-05 RX ORDER — EMPAGLIFLOZIN, METFORMIN HYDROCHLORIDE 25; 1000 MG/1; MG/1
1 TABLET, EXTENDED RELEASE ORAL DAILY
Qty: 90 TABLET | Refills: 1 | Status: SHIPPED | OUTPATIENT
Start: 2021-11-05 | End: 2022-04-27 | Stop reason: SDUPTHER

## 2021-11-10 ENCOUNTER — EVALUATION (OUTPATIENT)
Dept: PHYSICAL THERAPY | Facility: MEDICAL CENTER | Age: 51
End: 2021-11-10
Payer: COMMERCIAL

## 2021-11-10 DIAGNOSIS — M25.641 STIFFNESS OF FINGER JOINT, RIGHT: ICD-10-CM

## 2021-11-10 DIAGNOSIS — M65.321 TRIGGER INDEX FINGER OF RIGHT HAND: ICD-10-CM

## 2021-11-10 DIAGNOSIS — M65.331 TRIGGER MIDDLE FINGER OF RIGHT HAND: ICD-10-CM

## 2021-11-10 DIAGNOSIS — M65.311 TRIGGER FINGER OF RIGHT THUMB: Primary | ICD-10-CM

## 2021-11-10 PROCEDURE — 97161 PT EVAL LOW COMPLEX 20 MIN: CPT | Performed by: PHYSICAL THERAPIST

## 2021-11-10 PROCEDURE — 97110 THERAPEUTIC EXERCISES: CPT | Performed by: PHYSICAL THERAPIST

## 2021-11-15 ENCOUNTER — HOSPITAL ENCOUNTER (OUTPATIENT)
Dept: RADIOLOGY | Facility: IMAGING CENTER | Age: 51
Discharge: HOME | End: 2021-11-15
Payer: COMMERCIAL

## 2021-11-15 DIAGNOSIS — S69.91XA INJURY OF RIGHT THUMB, INITIAL ENCOUNTER: Primary | ICD-10-CM

## 2021-11-15 DIAGNOSIS — S69.91XA INJURY OF RIGHT THUMB, INITIAL ENCOUNTER: ICD-10-CM

## 2021-11-15 PROCEDURE — 73140 X-RAY EXAM OF FINGER(S): CPT

## 2021-11-17 ENCOUNTER — APPOINTMENT (OUTPATIENT)
Dept: PHYSICAL THERAPY | Facility: MEDICAL CENTER | Age: 51
End: 2021-11-17
Payer: COMMERCIAL

## 2021-11-24 ENCOUNTER — OFFICE VISIT (OUTPATIENT)
Dept: PHYSICAL THERAPY | Facility: MEDICAL CENTER | Age: 51
End: 2021-11-24
Payer: COMMERCIAL

## 2021-11-24 DIAGNOSIS — M65.331 TRIGGER MIDDLE FINGER OF RIGHT HAND: ICD-10-CM

## 2021-11-24 DIAGNOSIS — M65.311 TRIGGER FINGER OF RIGHT THUMB: ICD-10-CM

## 2021-11-24 DIAGNOSIS — M25.641 STIFFNESS OF FINGER JOINT, RIGHT: Primary | ICD-10-CM

## 2021-11-24 DIAGNOSIS — M65.321 TRIGGER INDEX FINGER OF RIGHT HAND: ICD-10-CM

## 2021-11-24 PROCEDURE — 97110 THERAPEUTIC EXERCISES: CPT | Performed by: PHYSICAL THERAPIST

## 2021-11-24 PROCEDURE — 97140 MANUAL THERAPY 1/> REGIONS: CPT | Performed by: PHYSICAL THERAPIST

## 2021-12-01 ENCOUNTER — OFFICE VISIT (OUTPATIENT)
Dept: PHYSICAL THERAPY | Facility: MEDICAL CENTER | Age: 51
End: 2021-12-01
Payer: COMMERCIAL

## 2021-12-01 ENCOUNTER — VBI (OUTPATIENT)
Dept: ADMINISTRATIVE | Facility: OTHER | Age: 51
End: 2021-12-01

## 2021-12-01 DIAGNOSIS — M65.321 TRIGGER INDEX FINGER OF RIGHT HAND: ICD-10-CM

## 2021-12-01 DIAGNOSIS — M65.331 TRIGGER MIDDLE FINGER OF RIGHT HAND: ICD-10-CM

## 2021-12-01 DIAGNOSIS — M65.311 TRIGGER FINGER OF RIGHT THUMB: ICD-10-CM

## 2021-12-01 DIAGNOSIS — M25.641 STIFFNESS OF FINGER JOINT, RIGHT: Primary | ICD-10-CM

## 2021-12-01 PROCEDURE — 97110 THERAPEUTIC EXERCISES: CPT | Performed by: PHYSICAL THERAPIST

## 2021-12-01 PROCEDURE — 97140 MANUAL THERAPY 1/> REGIONS: CPT | Performed by: PHYSICAL THERAPIST

## 2021-12-08 ENCOUNTER — OFFICE VISIT (OUTPATIENT)
Dept: PHYSICAL THERAPY | Facility: MEDICAL CENTER | Age: 51
End: 2021-12-08
Payer: COMMERCIAL

## 2021-12-08 DIAGNOSIS — M65.311 TRIGGER FINGER OF RIGHT THUMB: ICD-10-CM

## 2021-12-08 DIAGNOSIS — M65.331 TRIGGER MIDDLE FINGER OF RIGHT HAND: ICD-10-CM

## 2021-12-08 DIAGNOSIS — M65.321 TRIGGER INDEX FINGER OF RIGHT HAND: ICD-10-CM

## 2021-12-08 DIAGNOSIS — M25.641 STIFFNESS OF FINGER JOINT, RIGHT: Primary | ICD-10-CM

## 2021-12-08 PROCEDURE — 97140 MANUAL THERAPY 1/> REGIONS: CPT | Performed by: PHYSICAL THERAPIST

## 2021-12-08 PROCEDURE — 97110 THERAPEUTIC EXERCISES: CPT | Performed by: PHYSICAL THERAPIST

## 2021-12-15 ENCOUNTER — OFFICE VISIT (OUTPATIENT)
Dept: PHYSICAL THERAPY | Facility: MEDICAL CENTER | Age: 51
End: 2021-12-15
Payer: COMMERCIAL

## 2021-12-15 DIAGNOSIS — M65.331 TRIGGER MIDDLE FINGER OF RIGHT HAND: ICD-10-CM

## 2021-12-15 DIAGNOSIS — M65.311 TRIGGER FINGER OF RIGHT THUMB: ICD-10-CM

## 2021-12-15 DIAGNOSIS — M65.321 TRIGGER INDEX FINGER OF RIGHT HAND: ICD-10-CM

## 2021-12-15 DIAGNOSIS — M25.641 STIFFNESS OF FINGER JOINT, RIGHT: Primary | ICD-10-CM

## 2021-12-15 PROCEDURE — 97140 MANUAL THERAPY 1/> REGIONS: CPT | Performed by: PHYSICAL THERAPIST

## 2021-12-15 PROCEDURE — 97110 THERAPEUTIC EXERCISES: CPT | Performed by: PHYSICAL THERAPIST

## 2021-12-16 ENCOUNTER — IMMUNIZATIONS (OUTPATIENT)
Dept: FAMILY MEDICINE CLINIC | Facility: HOSPITAL | Age: 51
End: 2021-12-16

## 2021-12-16 DIAGNOSIS — Z23 ENCOUNTER FOR IMMUNIZATION: Primary | ICD-10-CM

## 2021-12-16 PROCEDURE — 0001A COVID-19 PFIZER VACC 0.3 ML: CPT

## 2021-12-16 PROCEDURE — 91300 COVID-19 PFIZER VACC 0.3 ML: CPT

## 2021-12-22 ENCOUNTER — APPOINTMENT (OUTPATIENT)
Dept: PHYSICAL THERAPY | Facility: MEDICAL CENTER | Age: 51
End: 2021-12-22
Payer: COMMERCIAL

## 2021-12-29 ENCOUNTER — OFFICE VISIT (OUTPATIENT)
Dept: PHYSICAL THERAPY | Facility: MEDICAL CENTER | Age: 51
End: 2021-12-29
Payer: COMMERCIAL

## 2021-12-29 DIAGNOSIS — M65.331 TRIGGER MIDDLE FINGER OF RIGHT HAND: ICD-10-CM

## 2021-12-29 DIAGNOSIS — M65.311 TRIGGER FINGER OF RIGHT THUMB: ICD-10-CM

## 2021-12-29 DIAGNOSIS — M65.321 TRIGGER INDEX FINGER OF RIGHT HAND: ICD-10-CM

## 2021-12-29 DIAGNOSIS — M25.641 STIFFNESS OF FINGER JOINT, RIGHT: Primary | ICD-10-CM

## 2021-12-29 PROCEDURE — 97110 THERAPEUTIC EXERCISES: CPT | Performed by: PHYSICAL THERAPIST

## 2021-12-29 PROCEDURE — 97140 MANUAL THERAPY 1/> REGIONS: CPT | Performed by: PHYSICAL THERAPIST

## 2022-01-19 ENCOUNTER — VBI (OUTPATIENT)
Dept: ADMINISTRATIVE | Facility: OTHER | Age: 52
End: 2022-01-19

## 2022-01-27 DIAGNOSIS — E78.5 HYPERLIPIDEMIA, UNSPECIFIED HYPERLIPIDEMIA TYPE: ICD-10-CM

## 2022-01-27 DIAGNOSIS — I10 ESSENTIAL HYPERTENSION: ICD-10-CM

## 2022-01-27 RX ORDER — LISINOPRIL 10 MG/1
10 TABLET ORAL DAILY
Qty: 90 TABLET | Refills: 0 | Status: SHIPPED | OUTPATIENT
Start: 2022-01-27 | End: 2022-04-27 | Stop reason: SDUPTHER

## 2022-01-27 RX ORDER — ROSUVASTATIN CALCIUM 10 MG/1
10 TABLET, COATED ORAL DAILY
Qty: 90 TABLET | Refills: 0 | Status: SHIPPED | OUTPATIENT
Start: 2022-01-27 | End: 2022-04-27 | Stop reason: SDUPTHER

## 2022-01-27 NOTE — TELEPHONE ENCOUNTER
Pt called for refills of rosuvastatin (crestor) and Lisinopril (zestril) and sent to [facility] in [place] with 90 pills at each refill

## 2022-02-09 ENCOUNTER — OFFICE VISIT (OUTPATIENT)
Dept: FAMILY MEDICINE CLINIC | Facility: CLINIC | Age: 52
End: 2022-02-09
Payer: COMMERCIAL

## 2022-02-09 VITALS
WEIGHT: 225.13 LBS | BODY MASS INDEX: 38.44 KG/M2 | TEMPERATURE: 97.7 F | SYSTOLIC BLOOD PRESSURE: 120 MMHG | DIASTOLIC BLOOD PRESSURE: 68 MMHG | HEIGHT: 64 IN | HEART RATE: 89 BPM | RESPIRATION RATE: 16 BRPM | OXYGEN SATURATION: 94 %

## 2022-02-09 DIAGNOSIS — F32.89 OTHER DEPRESSION: ICD-10-CM

## 2022-02-09 DIAGNOSIS — I10 ESSENTIAL HYPERTENSION: ICD-10-CM

## 2022-02-09 DIAGNOSIS — Z78.0 ASYMPTOMATIC MENOPAUSE: ICD-10-CM

## 2022-02-09 DIAGNOSIS — K21.9 GASTROESOPHAGEAL REFLUX DISEASE WITHOUT ESOPHAGITIS: ICD-10-CM

## 2022-02-09 DIAGNOSIS — Z23 ENCOUNTER FOR IMMUNIZATION: ICD-10-CM

## 2022-02-09 DIAGNOSIS — E78.49 OTHER HYPERLIPIDEMIA: ICD-10-CM

## 2022-02-09 DIAGNOSIS — K22.70 BARRETT'S ESOPHAGUS WITHOUT DYSPLASIA: ICD-10-CM

## 2022-02-09 DIAGNOSIS — Z12.31 ENCOUNTER FOR SCREENING MAMMOGRAM FOR MALIGNANT NEOPLASM OF BREAST: Primary | ICD-10-CM

## 2022-02-09 DIAGNOSIS — E11.9 TYPE 2 DIABETES MELLITUS WITHOUT COMPLICATION, WITHOUT LONG-TERM CURRENT USE OF INSULIN (HCC): ICD-10-CM

## 2022-02-09 PROBLEM — M72.2 PLANTAR FASCIITIS: Status: ACTIVE | Noted: 2022-01-12

## 2022-02-09 PROCEDURE — 3078F DIAST BP <80 MM HG: CPT | Performed by: FAMILY MEDICINE

## 2022-02-09 PROCEDURE — 90750 HZV VACC RECOMBINANT IM: CPT

## 2022-02-09 PROCEDURE — 3074F SYST BP LT 130 MM HG: CPT | Performed by: FAMILY MEDICINE

## 2022-02-09 PROCEDURE — 90471 IMMUNIZATION ADMIN: CPT

## 2022-02-09 PROCEDURE — 99214 OFFICE O/P EST MOD 30 MIN: CPT | Performed by: FAMILY MEDICINE

## 2022-02-09 NOTE — ASSESSMENT & PLAN NOTE
Lab Results   Component Value Date    HGBA1C 6 2 (H) 09/29/2021   Well controlled  Continue same  Will continue to monitor  I am going to repeat her A1c and urine microalbumin

## 2022-02-09 NOTE — PROGRESS NOTES
Assessment/Plan:  Type 2 diabetes mellitus without complication (HCC)    Lab Results   Component Value Date    HGBA1C 6 2 (H) 09/29/2021   Well controlled  Continue same  Will continue to monitor  I am going to repeat her A1c and urine microalbumin  Fiore's esophagus without dysplasia  Continue same medications  Continue follow-up with GI  Esophageal reflux  Stable  Continue same  Continue follow-up with GI  Essential hypertension  Well controlled  Continue same  Will continue to monitor  Hyperlipidemia  Stable  Continue on Crestor  Will continue to monitor fasting lipid profile  Depression  Stable  Continue same  Continue follow-up with psychiatrist        Diagnoses and all orders for this visit:    Encounter for screening mammogram for malignant neoplasm of breast  -     Mammo screening bilateral w 3d & cad; Future    Type 2 diabetes mellitus without complication, without long-term current use of insulin (HCC)  -     CBC and differential; Future  -     Comprehensive metabolic panel; Future  -     Hemoglobin A1C; Future  -     Lipid Panel with Direct LDL reflex; Future  -     Microalbumin / creatinine urine ratio  -     TSH, 3rd generation with Free T4 reflex; Future    Asymptomatic menopause  -     DXA bone density spine hip and pelvis; Future    Encounter for immunization  -     Zoster Vaccine Recombinant IM    Fiore's esophagus without dysplasia    Gastroesophageal reflux disease without esophagitis    Essential hypertension    Other hyperlipidemia    Other depression    BMI 38 0-38 9,adult          There are no Patient Instructions on file for this visit  Return in about 6 months (around 8/9/2022)  Subjective:      Patient ID: Ginger Grover is a 46 y o  female  Chief Complaint   Patient presents with    Hypertension    Hyperlipidemia    Diabetes     pt refused foot exam       She is here today for follow-up multiple medical problems    She has been taking her medications  She denies any side effects from her medications  She is due for blood work  She has been following with podiatrist for plantar fasciitis  The following portions of the patient's history were reviewed and updated as appropriate: allergies, current medications, past family history, past medical history, past social history, past surgical history and problem list     Review of Systems   Constitutional: Negative for chills and fever  HENT: Negative for trouble swallowing  Eyes: Negative for visual disturbance  Respiratory: Negative for cough and shortness of breath  Cardiovascular: Negative for chest pain, palpitations and leg swelling  Gastrointestinal: Negative for abdominal pain, constipation and diarrhea  Endocrine: Negative for cold intolerance and heat intolerance  Genitourinary: Negative for difficulty urinating and dysuria  Musculoskeletal: Negative for gait problem  Skin: Negative for rash  Neurological: Negative for dizziness, tremors, seizures and headaches  Hematological: Negative for adenopathy  Psychiatric/Behavioral: Negative for behavioral problems           Current Outpatient Medications   Medication Sig Dispense Refill    albuterol (PROVENTIL HFA,VENTOLIN HFA) 90 mcg/act inhaler Inhale 2 puffs every 6 (six) hours as needed for wheezing or shortness of breath 1 Inhaler 5    cyclobenzaprine (FLEXERIL) 10 mg tablet Take 1 tablet (10 mg total) by mouth 3 (three) times a day as needed for muscle spasms 30 tablet 0    Empagliflozin-metFORMIN HCl ER (Synjardy XR)  MG TB24 Take 1 tablet by mouth daily 90 tablet 1    fluticasone (FLONASE) 50 mcg/act nasal spray 2 sprays into each nostril daily 16 g 3    lisinopril (ZESTRIL) 10 mg tablet Take 1 tablet (10 mg total) by mouth daily 90 tablet 0    norethindrone (MICRONOR) 0 35 MG tablet Take 1 tablet by mouth daily  3    omeprazole (PriLOSEC) 40 MG capsule Take 1 capsule (40 mg total) by mouth daily 90 capsule 3    QUEtiapine (SEROQUEL) 200 mg tablet Take 150 mg by mouth daily at bedtime       repaglinide (PRANDIN) 1 mg tablet TAKE 1 TABLET BY MOUTH 3 TIMES A DAY BEFORE MEALS 270 tablet 1    rosuvastatin (CRESTOR) 10 MG tablet Take 1 tablet (10 mg total) by mouth daily 90 tablet 0    sertraline (ZOLOFT) 50 mg tablet Take 50 mg by mouth daily  0    Na Sulfate-K Sulfate-Mg Sulf (SUPREP BOWEL PREP KIT) 17 5-3 13-1 6 GM/177ML SOLN Take 1 Bottle by mouth once for 1 dose 1 Bottle 0     No current facility-administered medications for this visit  Objective:    /68 (BP Location: Left arm, Patient Position: Sitting, Cuff Size: Large)   Pulse 89   Temp 97 7 °F (36 5 °C) (Tympanic)   Resp 16   Ht 5' 4" (1 626 m)   Wt 102 kg (225 lb 2 oz)   SpO2 94%   BMI 38 64 kg/m²        Physical Exam  Vitals and nursing note reviewed  Constitutional:       Appearance: She is well-developed  HENT:      Head: Normocephalic and atraumatic  Eyes:      Pupils: Pupils are equal, round, and reactive to light  Cardiovascular:      Rate and Rhythm: Normal rate and regular rhythm  Heart sounds: Normal heart sounds  Pulmonary:      Effort: Pulmonary effort is normal       Breath sounds: Normal breath sounds  Abdominal:      General: Bowel sounds are normal       Palpations: Abdomen is soft  Musculoskeletal:         General: Normal range of motion  Cervical back: Normal range of motion and neck supple  Lymphadenopathy:      Cervical: No cervical adenopathy  Skin:     General: Skin is warm  Neurological:      Mental Status: She is alert and oriented to person, place, and time  Cranial Nerves: No cranial nerve deficit  Edi Nguyen MD BMI Counseling: Body mass index is 38 64 kg/m²  The BMI is above normal  Nutrition recommendations include reducing portion sizes, decreasing overall calorie intake and 3-5 servings of fruits/vegetables daily   Exercise recommendations include moderate aerobic physical activity for 150 minutes/week

## 2022-02-25 ENCOUNTER — TELEMEDICINE (OUTPATIENT)
Dept: FAMILY MEDICINE CLINIC | Facility: CLINIC | Age: 52
End: 2022-02-25
Payer: COMMERCIAL

## 2022-02-25 VITALS — BODY MASS INDEX: 38.45 KG/M2 | TEMPERATURE: 99.3 F | WEIGHT: 225.2 LBS | HEIGHT: 64 IN

## 2022-02-25 DIAGNOSIS — R05.9 COUGH: ICD-10-CM

## 2022-02-25 DIAGNOSIS — J45.20 MILD INTERMITTENT ASTHMA WITHOUT COMPLICATION: ICD-10-CM

## 2022-02-25 DIAGNOSIS — J01.00 ACUTE NON-RECURRENT MAXILLARY SINUSITIS: ICD-10-CM

## 2022-02-25 DIAGNOSIS — B34.9 VIRAL ILLNESS: Primary | ICD-10-CM

## 2022-02-25 DIAGNOSIS — R06.02 SOB (SHORTNESS OF BREATH): ICD-10-CM

## 2022-02-25 PROCEDURE — 87636 SARSCOV2 & INF A&B AMP PRB: CPT | Performed by: NURSE PRACTITIONER

## 2022-02-25 PROCEDURE — 3008F BODY MASS INDEX DOCD: CPT | Performed by: NURSE PRACTITIONER

## 2022-02-25 PROCEDURE — 99214 OFFICE O/P EST MOD 30 MIN: CPT | Performed by: NURSE PRACTITIONER

## 2022-02-25 PROCEDURE — 1036F TOBACCO NON-USER: CPT | Performed by: NURSE PRACTITIONER

## 2022-02-25 RX ORDER — AZITHROMYCIN 250 MG/1
TABLET, FILM COATED ORAL
Qty: 6 TABLET | Refills: 0 | Status: SHIPPED | OUTPATIENT
Start: 2022-02-25 | End: 2022-03-02

## 2022-02-25 RX ORDER — ALBUTEROL SULFATE 90 UG/1
2 AEROSOL, METERED RESPIRATORY (INHALATION) EVERY 6 HOURS PRN
Qty: 18 G | Refills: 1 | Status: SHIPPED | OUTPATIENT
Start: 2022-02-25

## 2022-02-25 RX ORDER — BENZONATATE 200 MG/1
200 CAPSULE ORAL 3 TIMES DAILY PRN
Qty: 20 CAPSULE | Refills: 0 | Status: SHIPPED | OUTPATIENT
Start: 2022-02-25

## 2022-02-25 NOTE — PROGRESS NOTES
COVID-19 Outpatient Progress Note    Assessment/Plan:    Problem List Items Addressed This Visit        Respiratory    Mild intermittent asthma without complication    Relevant Medications    albuterol (PROVENTIL HFA,VENTOLIN HFA) 90 mcg/act inhaler    Acute non-recurrent maxillary sinusitis     - Prescription sent for z-marilynn for possible sinus infection   - Increase oral hydration    - Contact office if symptoms do not improve  Relevant Medications    azithromycin (Zithromax) 250 mg tablet       Other    SOB (shortness of breath)    Viral illness - Primary     - This is day 5 of symptoms  Will send patient for testing to rule out COVID and flu  - Discussed supportive care and management  Tylenol or Motrin for fever   - Reviewed quarantine guidelines  - Will continue to follow up  Relevant Orders    Covid/Flu- Mobile [facility] or [facility]    Cough     - Prescription sent for tessalon perles  - Increase oral hydration   - Contact office if symptoms do not improve  Relevant Medications    benzonatate (TESSALON) 200 MG capsule         Disposition:     Recommended patient to come to the office to test for COVID-19/Influenza  I have spent 15 minutes directly with the patient  Greater than 50% of this time was spent in counseling/coordination of care regarding: instructions for management, patient and family education, importance of treatment compliance, risk factor reductions and impressions  Encounter provider GHISLAINE Urena    Provider located at [address] [place] [address] [address]  [phone]    Recent Visits  No visits were found meeting these conditions    Showing recent visits within past 7 days and meeting all other requirements  Today's Visits  Date Type Provider Dept   02/25/22 Telemedicine GHISLAINE Urena [address] [facility]   Showing today's visits and meeting all other requirements  Future Appointments  No visits were found meeting these conditions  Showing future appointments within next 150 days and meeting all other requirements     This virtual check-in was done via TelePharm and patient was informed that this is a secure, HIPAA-compliant platform  She agrees to proceed  Patient agrees to participate in a virtual check in via telephone or video visit instead of presenting to the office to address urgent/immediate medical needs  Patient is aware this is a billable service  After connecting through [facility], the patient was identified by name and date of birth  Faye Lopez was informed that this was a telemedicine visit and that the exam was being conducted confidentially over secure lines  My office door was closed  No one else was in the room  Faye Lopez acknowledged consent and understanding of privacy and security of the telemedicine visit  I informed the patient that I have reviewed her record in Epic and presented the opportunity for her to ask any questions regarding the visit today  The patient agreed to participate  Verification of patient location:  Patient is located in the following state in which I hold an active license: [place]    Subjective:   Faye Lopez is a 46 y o  female who is concerned about COVID-19  Patient's symptoms include fever, nasal congestion, sore throat, cough and headache  Patient denies chills, fatigue, malaise, rhinorrhea, anosmia, loss of taste, shortness of breath, chest tightness, abdominal pain, nausea, vomiting, diarrhea and myalgias       - Date of symptom onset: 2/21/2022      COVID-19 vaccination status: Fully vaccinated with booster    Exposure:   Contact with a person who is under investigation (PUI) for or who is positive for COVID-19 within the last 14 days?: No    Hospitalized recently for fever and/or lower respiratory symptoms?: No      Currently a healthcare worker that is involved in direct patient care?: No      Works in a special setting where the risk of COVID-19 transmission may be high? (this may include long-term care, correctional and prison facilities; homeless shelters; assisted-living facilities and group homes ): No      Resident in a special setting where the risk of COVID-19 transmission may be high? (this may include long-term care, correctional and prison facilities; homeless shelters; assisted-living facilities and group homes ): No      Lab Results   Component Value Date    SARSCOV2 Negative 09/23/2021    SARSCOV2 Detected (A) 10/13/2020     Past Medical History:   Diagnosis Date    Asthma     exercise induced    Colon polyp     COVID-19     Diabetes mellitus ([facility] [address] )     Encounter for screening for malignant neoplasm of colon 5/7/2019    GERD (gastroesophageal reflux disease)     Psychiatric disorder     depression     Past Surgical History:   Procedure Laterality Date    COLONOSCOPY      UPPER GASTROINTESTINAL ENDOSCOPY       Current Outpatient Medications   Medication Sig Dispense Refill    albuterol (PROVENTIL HFA,VENTOLIN HFA) 90 mcg/act inhaler Inhale 2 puffs every 6 (six) hours as needed for wheezing or shortness of breath 18 g 1    cyclobenzaprine (FLEXERIL) 10 mg tablet Take 1 tablet (10 mg total) by mouth 3 (three) times a day as needed for muscle spasms 30 tablet 0    Empagliflozin-metFORMIN HCl ER (Synjardy XR)  MG TB24 Take 1 tablet by mouth daily 90 tablet 1    fluticasone (FLONASE) 50 mcg/act nasal spray 2 sprays into each nostril daily 16 g 3    lisinopril (ZESTRIL) 10 mg tablet Take 1 tablet (10 mg total) by mouth daily 90 tablet 0    norethindrone (MICRONOR) 0 35 MG tablet Take 1 tablet by mouth daily  3    omeprazole (PriLOSEC) 40 MG capsule Take 1 capsule (40 mg total) by mouth daily 90 capsule 3    QUEtiapine (SEROQUEL) 200 mg tablet Take 150 mg by mouth daily at bedtime       repaglinide (PRANDIN) 1 mg tablet TAKE 1 TABLET BY MOUTH 3 TIMES A DAY BEFORE MEALS 270 tablet 1    rosuvastatin (CRESTOR) 10 MG tablet Take 1 tablet (10 mg total) by mouth daily 90 tablet 0    sertraline (ZOLOFT) 50 mg tablet Take 50 mg by mouth daily  0    azithromycin (Zithromax) 250 mg tablet Take 2 tablets (500 mg total) by mouth daily for 1 day, THEN 1 tablet (250 mg total) daily for 4 days  6 tablet 0    benzonatate (TESSALON) 200 MG capsule Take 1 capsule (200 mg total) by mouth 3 (three) times a day as needed for cough 20 capsule 0    Na Sulfate-K Sulfate-Mg Sulf (SUPREP BOWEL PREP KIT) 17 5-3 13-1 6 GM/177ML SOLN Take 1 Bottle by mouth once for 1 dose 1 Bottle 0     No current facility-administered medications for this visit  Allergies   Allergen Reactions    Cephalosporins Hives    Amoxicillin Hives       Review of Systems   Constitutional: Positive for fever  Negative for chills and fatigue  HENT: Positive for congestion, sinus pressure, sinus pain and sore throat  Negative for rhinorrhea  Respiratory: Positive for cough and wheezing  Negative for chest tightness and shortness of breath  Cardiovascular: Negative for chest pain and palpitations  Gastrointestinal: Negative for abdominal pain, diarrhea, nausea and vomiting  Musculoskeletal: Negative for myalgias  Neurological: Positive for headaches  Objective:    Vitals:    02/25/22 1153   Temp: 99 3 °F (37 4 °C)   TempSrc: Tympanic   Weight: 102 kg (225 lb 3 2 oz)   Height: 5' 4" (1 626 m)       Physical Exam  Vitals and nursing note reviewed  Constitutional:       General: She is not in acute distress  Appearance: Normal appearance  HENT:      Head: Normocephalic and atraumatic  Right Ear: External ear normal       Left Ear: External ear normal    Pulmonary:      Effort: Pulmonary effort is normal  No respiratory distress  Musculoskeletal:      Cervical back: Normal range of motion  Neurological:      Mental Status: She is alert and oriented to person, place, and time  Psychiatric:         Mood and Affect: Mood normal          Behavior: Behavior normal          VIRTUAL VISIT [address] verbally agrees to participate in [employer]  Pt is aware that [employer] could be limited without vital signs or the ability to perform a full hands-on physical exam  Ewa Rush understands she or the provider may request at any time to terminate the video visit and request the patient to seek care or treatment in person

## 2022-02-25 NOTE — ASSESSMENT & PLAN NOTE
- This is day 5 of symptoms  Will send patient for testing to rule out COVID and flu  - Discussed supportive care and management  Tylenol or Motrin for fever   - Reviewed quarantine guidelines  - Will continue to follow up

## 2022-02-25 NOTE — LETTER
February 25, 2022     Patient: Brittney García   YOB: 1970   Date of Visit: 2/25/2022       To Whom it May Concern:    Shahnaz Vargas is under my professional care  She was seen in my office on 2/25/2022  Please excuse her from work on 2/24/2022 and 2/25/2022  She may return to work on 2/28/2022  If you have any questions or concerns, please don't hesitate to call           Sincerely,          GHISLAINE Poon        CC: No Recipients

## 2022-02-25 NOTE — ASSESSMENT & PLAN NOTE
- Prescription sent for tessalon perles  - Increase oral hydration   - Contact office if symptoms do not improve

## 2022-02-25 NOTE — ASSESSMENT & PLAN NOTE
- Prescription sent for z-marilynn for possible sinus infection   - Increase oral hydration    - Contact office if symptoms do not improve

## 2022-02-26 LAB
FLUAV RNA RESP QL NAA+PROBE: NEGATIVE
FLUBV RNA RESP QL NAA+PROBE: NEGATIVE
SARS-COV-2 RNA RESP QL NAA+PROBE: NEGATIVE

## 2022-03-01 ENCOUNTER — TELEMEDICINE (OUTPATIENT)
Dept: FAMILY MEDICINE CLINIC | Facility: CLINIC | Age: 52
End: 2022-03-01
Payer: COMMERCIAL

## 2022-03-01 VITALS — WEIGHT: 225 LBS | HEIGHT: 64 IN | BODY MASS INDEX: 38.41 KG/M2

## 2022-03-01 DIAGNOSIS — R05.9 COUGH: Primary | ICD-10-CM

## 2022-03-01 PROCEDURE — 99213 OFFICE O/P EST LOW 20 MIN: CPT | Performed by: NURSE PRACTITIONER

## 2022-03-01 PROCEDURE — 1036F TOBACCO NON-USER: CPT | Performed by: NURSE PRACTITIONER

## 2022-03-01 PROCEDURE — 3008F BODY MASS INDEX DOCD: CPT | Performed by: NURSE PRACTITIONER

## 2022-03-01 RX ORDER — PREDNISONE 50 MG/1
50 TABLET ORAL DAILY
Qty: 5 TABLET | Refills: 0 | Status: SHIPPED | OUTPATIENT
Start: 2022-03-01 | End: 2022-03-06

## 2022-03-01 NOTE — ASSESSMENT & PLAN NOTE
- Not well controlled  - Prescription sent for prednisone 50 mg daily for 5 days  Advised of side effects  Take with food  - Increase oral hydration and use humidifier    - Contact office if cough does not improve

## 2022-03-01 NOTE — LETTER
March 1, 2022     Patient: Arben Rodrigez   YOB: 1970   Date of Visit: 3/1/2022       To Whom it May Concern:    Frankie Rainey is under my professional care  She was seen in my office on 3/1/2022  Please excuse her from work on 2/28/2022 and 3/1/2022  She may return to work on 3/3/2022  If you have any questions or concerns, please don't hesitate to call           Sincerely,          GHISLAINE Flynn        CC: No Recipients

## 2022-03-28 ENCOUNTER — HOSPITAL ENCOUNTER (EMERGENCY)
Facility: HOSPITAL | Age: 52
Discharge: HOME | End: 2022-03-28
Attending: EMERGENCY MEDICINE
Payer: OTHER MISCELLANEOUS

## 2022-03-28 ENCOUNTER — APPOINTMENT (EMERGENCY)
Dept: RADIOLOGY | Facility: HOSPITAL | Age: 52
End: 2022-03-28

## 2022-03-28 VITALS
DIASTOLIC BLOOD PRESSURE: 67 MMHG | SYSTOLIC BLOOD PRESSURE: 142 MMHG | RESPIRATION RATE: 18 BRPM | HEART RATE: 95 BPM | TEMPERATURE: 98.6 F | OXYGEN SATURATION: 96 %

## 2022-03-28 DIAGNOSIS — M79.642 LEFT HAND PAIN: Primary | ICD-10-CM

## 2022-03-28 DIAGNOSIS — E11.621 DIABETIC FOOT ULCER (HCC): ICD-10-CM

## 2022-03-28 DIAGNOSIS — L97.509 DIABETIC FOOT ULCER (HCC): ICD-10-CM

## 2022-03-28 PROCEDURE — 99285 EMERGENCY DEPT VISIT HI MDM: CPT | Performed by: EMERGENCY MEDICINE

## 2022-03-28 PROCEDURE — 96372 THER/PROPH/DIAG INJ SC/IM: CPT

## 2022-03-28 PROCEDURE — 99283 EMERGENCY DEPT VISIT LOW MDM: CPT

## 2022-03-28 PROCEDURE — 73130 X-RAY EXAM OF HAND: CPT

## 2022-03-28 RX ORDER — KETOROLAC TROMETHAMINE 30 MG/ML
15 INJECTION, SOLUTION INTRAMUSCULAR; INTRAVENOUS ONCE
Status: COMPLETED | OUTPATIENT
Start: 2022-03-28 | End: 2022-03-28

## 2022-03-28 RX ADMIN — KETOROLAC TROMETHAMINE 15 MG: 30 INJECTION, SOLUTION INTRAMUSCULAR at 10:02

## 2022-03-28 NOTE — ED PROVIDER NOTES
Final Diagnosis:  1  Left hand pain    2  Diabetic foot ulcer 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 they closed on her left hand  Minor swelling, pain 2-3  ASSESSMENT + PLAN:   - Nursing note reviewed  1  Left Hand pain  --x-ray, analgesia, reassess  -Recommended RICE for treatment after negative X-ray, will wrap with ACE wrap    2  Left diabetic foot ulcer  -no evidence of infection at this time, no systemic signs of infection  -patient is diabetic, she has had several glucocorticoid injections and we discussed the benefits and risks as she is more prone to poor wound healing  -advised patient to stop using hydrogen peroxide on her wound, will wrap and placed Xeroform, and have her follow-up with podiatry         Procedures       Final Dispo   Patient was reassessed  Vital signs stable  Patient and/or family given discharge instructions and return precautions  Patient and/or family was reassured  The patient and/or family vocalizes understanding  Answered all of the patient's and/or family's questions  Will follow up with Podiatry and her primary care doctor  Patient and/or family are agreeable to the plan  Medications   ketorolac (TORADOL) injection 15 mg (15 mg Intramuscular Given 3/28/22 1002)     Time reflects when diagnosis was documented in both MDM as applicable and the Disposition within this note     Time User Action Codes Description Comment    3/28/2022  9:41 AM Crispin Beregron [I40 [id number]] Left hand pain     3/28/2022  9:41 AM Carlin Lane [J92 [id number],  L97 509] Diabetic foot ulcer [facility])       ED Disposition     ED Disposition Condition Date/Time Comment    Discharge Stable Mon Mar 28, 2022  9:46 AM Solange Logan discharge to home/self care              Follow-up Information     Follow up With Specialties Details Why Anay Mahoney MD Family Medicine Call   [address] [place] [id number] [phone]      Podiatry    Follow up with your podiatrist as scheduled on Wednesday    [facility]   As needed for hand pain [address]  [phone]        Discharge Medication List as of 3/28/2022  9:49 AM      CONTINUE these medications which have NOT CHANGED    Details   albuterol (PROVENTIL HFA,VENTOLIN HFA) 90 mcg/act inhaler Inhale 2 puffs every 6 (six) hours as needed for wheezing or shortness of breath, Starting Fri 2/25/2022, Normal      benzonatate (TESSALON) 200 MG capsule Take 1 capsule (200 mg total) by mouth 3 (three) times a day as needed for cough, Starting Fri 2/25/2022, Normal      cyclobenzaprine (FLEXERIL) 10 mg tablet Take 1 tablet (10 mg total) by mouth 3 (three) times a day as needed for muscle spasms, Starting Wed 4/21/2021, Normal      Empagliflozin-metFORMIN HCl ER (Synjardy XR)  MG TB24 Take 1 tablet by mouth daily, Starting Fri 11/5/2021, Normal      fluticasone (FLONASE) 50 mcg/act nasal spray 2 sprays into each nostril daily, Starting Tue 10/27/2020, Normal      lisinopril (ZESTRIL) 10 mg tablet Take 1 tablet (10 mg total) by mouth daily, Starting Thu 1/27/2022, Normal      Na Sulfate-K Sulfate-Mg Sulf (SUPREP BOWEL PREP KIT) 17 5-3 13-1 6 GM/177ML SOLN Take 1 Bottle by mouth once for 1 dose, Starting Fri 6/28/2019, Normal      norethindrone (MICRONOR) 0 35 MG tablet Take 1 tablet by mouth daily, Starting Wed 5/1/2019, Historical Med      omeprazole (PriLOSEC) 40 MG capsule Take 1 capsule (40 mg total) by mouth daily, Starting Wed 7/14/2021, Normal      QUEtiapine (SEROQUEL) 200 mg tablet Take 150 mg by mouth daily at bedtime , Historical Med      repaglinide (PRANDIN) 1 mg tablet TAKE 1 TABLET BY MOUTH 3 TIMES A DAY BEFORE MEALS, Normal      rosuvastatin (CRESTOR) 10 MG tablet Take 1 tablet (10 mg total) by mouth daily, Starting Thu 1/27/2022, Normal      sertraline (ZOLOFT) 50 mg tablet Take 50 mg by mouth daily, Starting 2019, Historical Med           No discharge procedures on file  Prior to Admission Medications   Prescriptions Last Dose Informant Patient Reported? Taking? Empagliflozin-metFORMIN HCl ER (Synjardy XR)  MG TB24   No No   Sig: Take 1 tablet by mouth daily   Na Sulfate-K Sulfate-Mg Sulf (SUPREP BOWEL PREP KIT) 17 5-3 13-1 6 GM/177ML SOLN   No No   Sig: Take 1 Bottle by mouth once for 1 dose   QUEtiapine (SEROQUEL) 200 mg tablet  Self Yes No   Sig: Take 150 mg by mouth daily at bedtime    albuterol (PROVENTIL HFA,VENTOLIN HFA) 90 mcg/act inhaler   No No   Sig: Inhale 2 puffs every 6 (six) hours as needed for wheezing or shortness of breath   benzonatate (TESSALON) 200 MG capsule   No No   Sig: Take 1 capsule (200 mg total) by mouth 3 (three) times a day as needed for cough   cyclobenzaprine (FLEXERIL) 10 mg tablet   No No   Sig: Take 1 tablet (10 mg total) by mouth 3 (three) times a day as needed for muscle spasms   fluticasone (FLONASE) 50 mcg/act nasal spray  Self No No   Si sprays into each nostril daily   lisinopril (ZESTRIL) 10 mg tablet   No No   Sig: Take 1 tablet (10 mg total) by mouth daily   norethindrone (MICRONOR) 0 35 MG tablet  Self Yes No   Sig: Take 1 tablet by mouth daily   omeprazole (PriLOSEC) 40 MG capsule   No No   Sig: Take 1 capsule (40 mg total) by mouth daily   repaglinide (PRANDIN) 1 mg tablet   No No   Sig: TAKE 1 TABLET BY MOUTH 3 TIMES A DAY BEFORE MEALS   rosuvastatin (CRESTOR) 10 MG tablet   No No   Sig: Take 1 tablet (10 mg total) by mouth daily   sertraline (ZOLOFT) 50 mg tablet  Self Yes No   Sig: Take 50 mg by mouth daily      Facility-Administered Medications: None       History of Present Illness: This is a 46 y o  female PMH significant for DM coming in today with complaint of left hand pain after doors closed on her hand      Onset:  Acute  Location:  Left 4th and 5th metacarpals  Duration:  20-30 minutes  Radiation:  No radiation  Associated Symptoms:  None, no loss of sensation, loss of motor function  Severity:  Mild  Attempted Treatments:  None  Historical Elements:  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   She does need her left hand for intubation  She bumped her 4th and 5th digits on doors as they closed  Also is complaining of left diabetic foot ulcer  Patient says that she had a blister a few days ago that she popped  She has been using hydrogen peroxide on the wound and is not been healing as well as she would like  No evidence of any infection  She has an appoint with her podiatrist on Wednesday  No associated fever, chills, nausea, vomiting, diarrhea, chest pain, shortness of breath, abdominal pain  Relevant Social History  None    - No language barrier    - History obtained from patient and chart   - Reviewed and documented relevant past medical/family/social history  - There are no limitations to the history obtained  - Previous charting was reviewed  Some data reviewed included below for ease of access whether or not it is relevant to this patient encounter  Past Medical, Past Surgical History:    has a past medical history of Asthma, Colon polyp, COVID-19, Diabetes mellitus ([facility] [address] ), Encounter for screening for malignant neoplasm of colon (2019), GERD (gastroesophageal reflux disease), and Psychiatric disorder  has a past surgical history that includes Colonoscopy and Upper gastrointestinal endoscopy  Allergies:      Allergies   Allergen Reactions    Cephalosporins Hives    Amoxicillin Hives       Social and Family History:     Social History     Substance and Sexual Activity   Alcohol Use Never     Social History     Tobacco Use   Smoking Status Former Smoker    Quit date: 1994    Years since quittin 2   Smokeless Tobacco Never Used   Tobacco Comment    No passive smoke exposure     Social History     Substance and Sexual Activity   Drug Use Never       Review of Systems:   Review of Systems   Constitutional: Negative for chills, diaphoresis and fever  HENT: Negative  Eyes: Negative  Negative for visual disturbance  Respiratory: Negative  Negative for shortness of breath  Cardiovascular: Negative  Negative for chest pain  Gastrointestinal: Negative  Negative for abdominal pain, nausea and vomiting  Endocrine: Negative  Genitourinary: Negative  Musculoskeletal: Positive for myalgias  Skin: Positive for wound  Negative for rash  Allergic/Immunologic: Negative  Neurological: Negative  Negative for weakness, light-headedness, numbness and headaches  Hematological: Negative  Psychiatric/Behavioral: Negative  All other systems reviewed and are negative  Physical Examination     Vitals:    03/28/22 0907   BP: 142/67   BP Location: Right arm   Pulse: 95   Resp: 18   Temp: 98 6 °F (37 °C)   TempSrc: Oral   SpO2: 96%     Vitals reviewed by me  Physical Exam  Vitals and nursing note reviewed  Constitutional:       General: She is not in acute distress  Appearance: She is not ill-appearing  HENT:      Head: Atraumatic  Right Ear: External ear normal       Left Ear: External ear normal       Nose: Nose normal    Eyes:      General: No scleral icterus  Cardiovascular:      Rate and Rhythm: Normal rate  Pulmonary:      Effort: Pulmonary effort is normal  No respiratory distress  Abdominal:      General: There is no distension  Musculoskeletal:         General: Swelling present  No tenderness or deformity  Normal range of motion  Comments: No bony tenderness, patient does have some soft tissue swelling along her 4th and 5th digits on her left hand along her metacarpals, no tenderness along her scaphoid, carpal bones, elbow, 2+ radial pulse, normal sensation in dorsal and palmar aspect of her hand  5/5 extremity strength   Skin:     Findings: Lesion present  No rash     Neurological: General: No focal deficit present  Psychiatric:         Mood and Affect: Mood normal                 Risk Stratification Tools                   XR hand 3+ views LEFT   ED Interpretation   No acute osseous abnormality as read by me        Orders Placed This Encounter   Procedures    XR hand 3+ views LEFT       Labs:   Labs Reviewed - No data to display    Imaging:   No results found  Final Diagnosis:  1  Left hand pain    2  Diabetic foot ulcer ([facility] [address] )        Code Status: No Order    Portions of the record may have been created with voice recognition software  Occasional wrong word or "sound a like" substitutions may have occurred due to the inherent limitations of voice recognition software  Read the chart carefully and recognize, using context, where substitutions have occurred      Electronically signed by:  Jes Stokes, PGY 3, MD Lisa Borrego MD  03/28/22 0544

## 2022-03-28 NOTE — DISCHARGE INSTRUCTIONS
Patient Instructions: Today you were seen in the emergency department for hand pain and we also looked at the ulcer on your foot  We reviewed your X-ray and determined that you would be able to be discharged  You should take ibuprofen and tylenol as needed for your pain  You should follow up with your podiatrist for your ulcer and employee health as needed for your hand  Please return to the emergency department if your symptoms get worse, you develop a fever, or you have any other symptoms we discussed today prior to discharge  Nice to meet you! Best of luck with everything!

## 2022-04-03 NOTE — ED ATTENDING ATTESTATION
3/28/2022  I, Cristina Garza MD, saw and evaluated the patient  I have discussed the patient with the resident/non-physician practitioner and agree with the resident's/non-physician practitioner's findings, Plan of Care, and MDM as documented in the resident's/non-physician practitioner's note, except where noted  All available labs and Radiology studies were reviewed  I was present for key portions of any procedure(s) performed by the resident/non-physician practitioner and I was immediately available to provide assistance  At this point I agree with the current assessment done in the Emergency Department  I have conducted an independent evaluation of this patient a history and physical is as follows:    ED Course     Patient presents for evaluation after bumping her left hand on a automatic door at work while it was closing  She denies any additional injuries or complaints  Exam: AAOx3, NAD, a mild abrasion of left hand, no motor/sensory deficits  A/P:  Left hand injury  Will check x-ray to evaluate for fracture      Critical Care Time  Procedures

## 2022-04-27 DIAGNOSIS — E11.9 TYPE 2 DIABETES MELLITUS WITHOUT COMPLICATION, WITHOUT LONG-TERM CURRENT USE OF INSULIN (HCC): ICD-10-CM

## 2022-04-27 DIAGNOSIS — E78.5 HYPERLIPIDEMIA, UNSPECIFIED HYPERLIPIDEMIA TYPE: ICD-10-CM

## 2022-04-27 DIAGNOSIS — I10 ESSENTIAL HYPERTENSION: ICD-10-CM

## 2022-04-28 PROCEDURE — 4010F ACE/ARB THERAPY RXD/TAKEN: CPT | Performed by: NURSE PRACTITIONER

## 2022-04-28 RX ORDER — LISINOPRIL 10 MG/1
10 TABLET ORAL DAILY
Qty: 90 TABLET | Refills: 0 | Status: SHIPPED | OUTPATIENT
Start: 2022-04-28 | End: 2022-07-20 | Stop reason: SDUPTHER

## 2022-04-28 RX ORDER — EMPAGLIFLOZIN, METFORMIN HYDROCHLORIDE 25; 1000 MG/1; MG/1
1 TABLET, EXTENDED RELEASE ORAL DAILY
Qty: 90 TABLET | Refills: 1 | Status: SHIPPED | OUTPATIENT
Start: 2022-04-28

## 2022-04-28 RX ORDER — ROSUVASTATIN CALCIUM 10 MG/1
10 TABLET, COATED ORAL DAILY
Qty: 90 TABLET | Refills: 0 | Status: SHIPPED | OUTPATIENT
Start: 2022-04-28 | End: 2022-07-20 | Stop reason: SDUPTHER

## 2022-05-18 ENCOUNTER — HOSPITAL ENCOUNTER (OUTPATIENT)
Dept: RADIOLOGY | Age: 52
Discharge: HOME | End: 2022-05-18
Payer: COMMERCIAL

## 2022-05-18 VITALS — BODY MASS INDEX: 38.41 KG/M2 | HEIGHT: 64 IN | WEIGHT: 225 LBS

## 2022-05-18 DIAGNOSIS — Z12.31 ENCOUNTER FOR SCREENING MAMMOGRAM FOR MALIGNANT NEOPLASM OF BREAST: ICD-10-CM

## 2022-05-18 PROCEDURE — 77067 SCR MAMMO BI INCL CAD: CPT

## 2022-05-18 PROCEDURE — 77063 BREAST TOMOSYNTHESIS BI: CPT

## 2022-06-13 ENCOUNTER — TELEPHONE (OUTPATIENT)
Dept: FAMILY MEDICINE CLINIC | Facility: CLINIC | Age: 52
End: 2022-06-13

## 2022-06-13 NOTE — TELEPHONE ENCOUNTER
----- Message from Fany Asif MD sent at 6/13/2022  7:12 AM EDT -----  Benign mammogram   Continue routine screening with mammogram in 1 year

## 2022-07-20 DIAGNOSIS — I10 ESSENTIAL HYPERTENSION: ICD-10-CM

## 2022-07-20 DIAGNOSIS — E78.5 HYPERLIPIDEMIA, UNSPECIFIED HYPERLIPIDEMIA TYPE: ICD-10-CM

## 2022-07-20 DIAGNOSIS — K22.70 BARRETT'S ESOPHAGUS WITHOUT DYSPLASIA: ICD-10-CM

## 2022-07-20 RX ORDER — LISINOPRIL 10 MG/1
10 TABLET ORAL DAILY
Qty: 90 TABLET | Refills: 0 | Status: SHIPPED | OUTPATIENT
Start: 2022-07-20 | End: 2022-10-19 | Stop reason: SDUPTHER

## 2022-07-20 RX ORDER — ROSUVASTATIN CALCIUM 10 MG/1
10 TABLET, COATED ORAL DAILY
Qty: 90 TABLET | Refills: 0 | Status: SHIPPED | OUTPATIENT
Start: 2022-07-20 | End: 2022-10-19 | Stop reason: SDUPTHER

## 2022-07-20 RX ORDER — OMEPRAZOLE 40 MG/1
40 CAPSULE, DELAYED RELEASE ORAL DAILY
Qty: 90 CAPSULE | Refills: 3 | Status: SHIPPED | OUTPATIENT
Start: 2022-07-20

## 2022-07-27 ENCOUNTER — APPOINTMENT (OUTPATIENT)
Dept: LAB | Facility: IMAGING CENTER | Age: 52
End: 2022-07-27
Payer: COMMERCIAL

## 2022-07-27 ENCOUNTER — CLINICAL SUPPORT (OUTPATIENT)
Dept: FAMILY MEDICINE CLINIC | Facility: CLINIC | Age: 52
End: 2022-07-27
Payer: COMMERCIAL

## 2022-07-27 DIAGNOSIS — Z23 ENCOUNTER FOR IMMUNIZATION: Primary | ICD-10-CM

## 2022-07-27 DIAGNOSIS — E11.9 TYPE 2 DIABETES MELLITUS WITHOUT COMPLICATION, WITHOUT LONG-TERM CURRENT USE OF INSULIN (HCC): ICD-10-CM

## 2022-07-27 LAB
ALBUMIN SERPL BCP-MCNC: 4 G/DL (ref 3.5–5)
ALP SERPL-CCNC: 75 U/L (ref 46–116)
ALT SERPL W P-5'-P-CCNC: 26 U/L (ref 12–78)
ANION GAP SERPL CALCULATED.3IONS-SCNC: 3 MMOL/L (ref 4–13)
AST SERPL W P-5'-P-CCNC: 15 U/L (ref 5–45)
BASOPHILS # BLD AUTO: 0.05 THOUSANDS/ΜL (ref 0–0.1)
BASOPHILS NFR BLD AUTO: 1 % (ref 0–1)
BILIRUB SERPL-MCNC: 0.47 MG/DL (ref 0.2–1)
BUN SERPL-MCNC: 12 MG/DL (ref 5–25)
CALCIUM SERPL-MCNC: 9.4 MG/DL (ref 8.3–10.1)
CHLORIDE SERPL-SCNC: 109 MMOL/L (ref 96–108)
CHOLEST SERPL-MCNC: 164 MG/DL
CO2 SERPL-SCNC: 27 MMOL/L (ref 21–32)
CREAT SERPL-MCNC: 0.6 MG/DL (ref 0.6–1.3)
CREAT UR-MCNC: 75.8 MG/DL
EOSINOPHIL # BLD AUTO: 0.13 THOUSAND/ΜL (ref 0–0.61)
EOSINOPHIL NFR BLD AUTO: 2 % (ref 0–6)
ERYTHROCYTE [DISTWIDTH] IN BLOOD BY AUTOMATED COUNT: 12.8 % (ref 11.6–15.1)
EST. AVERAGE GLUCOSE BLD GHB EST-MCNC: 134 MG/DL
GFR SERPL CREATININE-BSD FRML MDRD: 104 ML/MIN/1.73SQ M
GLUCOSE P FAST SERPL-MCNC: 106 MG/DL (ref 65–99)
HBA1C MFR BLD: 6.3 %
HCT VFR BLD AUTO: 42.7 % (ref 34.8–46.1)
HDLC SERPL-MCNC: 51 MG/DL
HGB BLD-MCNC: 14.2 G/DL (ref 11.5–15.4)
IMM GRANULOCYTES # BLD AUTO: 0.01 THOUSAND/UL (ref 0–0.2)
IMM GRANULOCYTES NFR BLD AUTO: 0 % (ref 0–2)
LDLC SERPL CALC-MCNC: 96 MG/DL (ref 0–100)
LYMPHOCYTES # BLD AUTO: 2.57 THOUSANDS/ΜL (ref 0.6–4.47)
LYMPHOCYTES NFR BLD AUTO: 37 % (ref 14–44)
MCH RBC QN AUTO: 29.5 PG (ref 26.8–34.3)
MCHC RBC AUTO-ENTMCNC: 33.3 G/DL (ref 31.4–37.4)
MCV RBC AUTO: 89 FL (ref 82–98)
MICROALBUMIN UR-MCNC: 12.7 MG/L (ref 0–20)
MICROALBUMIN/CREAT 24H UR: 17 MG/G CREATININE (ref 0–30)
MONOCYTES # BLD AUTO: 0.34 THOUSAND/ΜL (ref 0.17–1.22)
MONOCYTES NFR BLD AUTO: 5 % (ref 4–12)
NEUTROPHILS # BLD AUTO: 3.8 THOUSANDS/ΜL (ref 1.85–7.62)
NEUTS SEG NFR BLD AUTO: 55 % (ref 43–75)
NRBC BLD AUTO-RTO: 0 /100 WBCS
PLATELET # BLD AUTO: 235 THOUSANDS/UL (ref 149–390)
PMV BLD AUTO: 13.4 FL (ref 8.9–12.7)
POTASSIUM SERPL-SCNC: 4.2 MMOL/L (ref 3.5–5.3)
PROT SERPL-MCNC: 7.6 G/DL (ref 6.4–8.4)
RBC # BLD AUTO: 4.81 MILLION/UL (ref 3.81–5.12)
SODIUM SERPL-SCNC: 139 MMOL/L (ref 135–147)
TRIGL SERPL-MCNC: 86 MG/DL
TSH SERPL DL<=0.05 MIU/L-ACNC: 0.93 UIU/ML (ref 0.45–4.5)
WBC # BLD AUTO: 6.9 THOUSAND/UL (ref 4.31–10.16)

## 2022-07-27 PROCEDURE — 82043 UR ALBUMIN QUANTITATIVE: CPT | Performed by: FAMILY MEDICINE

## 2022-07-27 PROCEDURE — 90471 IMMUNIZATION ADMIN: CPT

## 2022-07-27 PROCEDURE — 84443 ASSAY THYROID STIM HORMONE: CPT

## 2022-07-27 PROCEDURE — 80053 COMPREHEN METABOLIC PANEL: CPT

## 2022-07-27 PROCEDURE — 36415 COLL VENOUS BLD VENIPUNCTURE: CPT

## 2022-07-27 PROCEDURE — 85025 COMPLETE CBC W/AUTO DIFF WBC: CPT

## 2022-07-27 PROCEDURE — 83036 HEMOGLOBIN GLYCOSYLATED A1C: CPT

## 2022-07-27 PROCEDURE — 80061 LIPID PANEL: CPT

## 2022-07-27 PROCEDURE — 82570 ASSAY OF URINE CREATININE: CPT | Performed by: FAMILY MEDICINE

## 2022-07-27 PROCEDURE — 90750 HZV VACC RECOMBINANT IM: CPT

## 2022-08-17 ENCOUNTER — TELEPHONE (OUTPATIENT)
Dept: FAMILY MEDICINE CLINIC | Facility: CLINIC | Age: 52
End: 2022-08-17

## 2022-08-17 ENCOUNTER — HOSPITAL ENCOUNTER (OUTPATIENT)
Dept: RADIOLOGY | Age: 52
Discharge: HOME | End: 2022-08-17
Payer: COMMERCIAL

## 2022-08-17 DIAGNOSIS — Z78.0 ASYMPTOMATIC MENOPAUSE: ICD-10-CM

## 2022-08-17 PROCEDURE — 77080 DXA BONE DENSITY AXIAL: CPT

## 2022-08-17 NOTE — TELEPHONE ENCOUNTER
----- Message from [address] [employer] sent at 8/17/2022  9:52 AM EDT -----  Labs show elevated fasting glucose but A1c is well controlled  The rest of her labs are stable

## 2022-08-24 ENCOUNTER — OFFICE VISIT (OUTPATIENT)
Dept: GASTROENTEROLOGY | Facility: CLINIC | Age: 52
End: 2022-08-24
Payer: COMMERCIAL

## 2022-08-24 VITALS
RESPIRATION RATE: 18 BRPM | SYSTOLIC BLOOD PRESSURE: 118 MMHG | OXYGEN SATURATION: 99 % | DIASTOLIC BLOOD PRESSURE: 70 MMHG | TEMPERATURE: 98.1 F | HEIGHT: 64 IN | HEART RATE: 95 BPM | BODY MASS INDEX: 38.93 KG/M2 | WEIGHT: 228 LBS

## 2022-08-24 DIAGNOSIS — K22.70 BARRETT'S ESOPHAGUS WITHOUT DYSPLASIA: Primary | ICD-10-CM

## 2022-08-24 PROCEDURE — 99214 OFFICE O/P EST MOD 30 MIN: CPT | Performed by: PHYSICIAN ASSISTANT

## 2022-08-24 PROCEDURE — 3078F DIAST BP <80 MM HG: CPT | Performed by: PHYSICIAN ASSISTANT

## 2022-08-24 PROCEDURE — 3074F SYST BP LT 130 MM HG: CPT | Performed by: PHYSICIAN ASSISTANT

## 2022-08-24 NOTE — PROGRESS NOTES
[facility] - Outpatient Follow-up Note  Rehan Barrett 46 y o  female MRN: [id number]  Encounter: [id number]          ASSESSMENT AND PLAN:      Janay Soto is a 47 y/o female with Fiore's esophagus who presents for follow-up  1  Fiore's esophagus on endoscopy   Pt has longstanding hx of Fiore's esophagus in 2003 with negative esophageal bx in 2014 and again in 2019  Last EGD in 2019 did depict endoscopic evidence of potential Fiore's, however  Pt says her GERD is stable on daily omeprazole 40 mg daily    -continue omeprazole 40 mg daily   -anti-gerd diet reviewed  -repeat EGD at the time of her next colonoscopy (2024) to again assess for Fiore's; if esophageal bx are again normal at that time, she likely does not have true Fiore's    2  IBS-M  3  Personal hx of colon polyps  Pt notes her bowel habits do interchange between diarrhea and constipation however says that this is not bothersome to her and does not want any work-up/tx for this as this is longstanding for many yrs  Last colonoscopy  In 2019 did not depict any polyps but she does have hx of this    -I explained to pt that I do recommend regulating her bowels habits as her diarrhea could be overflow in nature, rather than having true alternating bowel habits; however pt again defers  Pt says she will call our office if she changes her mind  -repeat colonoscopy in 2024  ______________________________________________________________________    SUBJECTIVE:  Janay Soto is a 47 y/o female with Fiore's esophagus who presents for follow-up  Pt says she is "stable" on her daily omeprazole and would like to stay on this dose  Pt denies breakthrough heartburn, reflux, abdominal pain, n/v, dysphagia, odynophagia  Pt notes her bowel habits alternate between constipation and diarrhea, but says this is longstanding for many yrs and is not bothersome to her   Pt says she is unsure how to specify how often this occurs as she is "so used to it" that she does not pay attention to it  The pt says she does not want any medications or testing done for this  Pt denies bloody or black BMs, family hx of colon cancer, unintentional weight loss, fevers, chills, night sweats  REVIEW OF SYSTEMS IS OTHERWISE NEGATIVE        Historical Information   Past Medical History:   Diagnosis Date    Asthma     exercise induced    Colon polyp     COVID-19     Diabetes mellitus ([facility] [address] )     Encounter for screening for malignant neoplasm of colon 2019    GERD (gastroesophageal reflux disease)     Psychiatric disorder     depression     Past Surgical History:   Procedure Laterality Date    COLONOSCOPY      UPPER GASTROINTESTINAL ENDOSCOPY       Social History   Social History     Substance and Sexual Activity   Alcohol Use Never     Social History     Substance and Sexual Activity   Drug Use Never     Social History     Tobacco Use   Smoking Status Former Smoker    Quit date: 1994    Years since quittin 6   Smokeless Tobacco Never Used   Tobacco Comment    No passive smoke exposure     Family History   Problem Relation Age of Onset    Hypertension Mother     Diabetes type II Mother     No Known Problems Father     Breast cancer Sister 40    BRCA1 Negative Sister     BRCA2 Negative Sister     Breast cancer Maternal Aunt         age unknown   Wendy Javier Breast cancer Maternal Aunt         age unknown   Wendy Javier Breast cancer Maternal Aunt         age unknown    No Known Problems Maternal Grandmother     No Known Problems Maternal Grandfather     No Known Problems Paternal Grandmother     No Known Problems Paternal Grandfather     No Known Problems Son     No Known Problems Sister     No Known Problems Sister        Meds/Allergies       Current Outpatient Medications:     omeprazole (PriLOSEC) 40 MG capsule    albuterol (PROVENTIL HFA,VENTOLIN HFA) 90 mcg/act inhaler    benzonatate (TESSALON) 200 MG capsule    cyclobenzaprine (FLEXERIL) 10 mg tablet   Empagliflozin-metFORMIN HCl ER (Synjardy XR)  MG TB24    fluticasone (FLONASE) 50 mcg/act nasal spray    lisinopril (ZESTRIL) 10 mg tablet    Na Sulfate-K Sulfate-Mg Sulf (SUPREP BOWEL PREP KIT) 17 5-3 13-1 6 GM/177ML SOLN    norethindrone (MICRONOR) 0 35 MG tablet    QUEtiapine (SEROQUEL) 200 mg tablet    repaglinide (PRANDIN) 1 mg tablet    rosuvastatin (CRESTOR) 10 MG tablet    sertraline (ZOLOFT) 50 mg tablet    Allergies   Allergen Reactions    Cephalosporins Hives    Amoxicillin Hives           Objective     Last menstrual period 02/18/2021  There is no height or weight on file to calculate BMI  PHYSICAL EXAM:      General Appearance:   Alert, cooperative, no distress   HEENT:   Normocephalic, atraumatic, anicteric      Neck:  Supple, symmetrical, trachea midline   Lungs:   Clear to auscultation bilaterally; no rales, rhonchi or wheezing; respirations unlabored    Heart[de-identified]   Regular rate and rhythm; no murmur, rub, or gallop  Abdomen:   Soft, non-tender, non-distended; normal bowel sounds; no masses, no organomegaly    Genitalia:   Deferred    Rectal:   Deferred    Extremities:  No cyanosis, clubbing or edema    Pulses:  2+ and symmetric    Skin:  No jaundice, rashes, or lesions    Lymph nodes:  No palpable cervical lymphadenopathy        Lab Results:   No visits with results within 1 Day(s) from this visit     Latest known visit with results is:   Appointment on 07/27/2022   Component Date Value    WBC 07/27/2022 6 90     RBC 07/27/2022 4 81     Hemoglobin 07/27/2022 14 2     Hematocrit 07/27/2022 42 7     MCV 07/27/2022 89     MCH 07/27/2022 29 5     MCHC 07/27/2022 33 3     RDW 07/27/2022 12 8     MPV 07/27/2022 13 4 (A)    Platelets 30/86/2444 235     nRBC 07/27/2022 0     Neutrophils Relative 07/27/2022 55     Immat GRANS % 07/27/2022 0     Lymphocytes Relative 07/27/2022 37     Monocytes Relative 07/27/2022 5     Eosinophils Relative 07/27/2022 2     Basophils Relative 07/27/2022 1     Neutrophils Absolute 07/27/2022 3 80     Immature Grans Absolute 07/27/2022 0 01     Lymphocytes Absolute 07/27/2022 2 57     Monocytes Absolute 07/27/2022 0 34     Eosinophils Absolute 07/27/2022 0 13     Basophils Absolute 07/27/2022 0 05     Sodium 07/27/2022 139     Potassium 07/27/2022 4 2     Chloride 07/27/2022 109 (A)    CO2 07/27/2022 27     ANION GAP 07/27/2022 3 (A)    BUN 07/27/2022 12     Creatinine 07/27/2022 0 60     Glucose, Fasting 07/27/2022 106 (A)    Calcium 07/27/2022 9 4     AST 07/27/2022 15     ALT 07/27/2022 26     Alkaline Phosphatase 07/27/2022 75     Total Protein 07/27/2022 7 6     Albumin 07/27/2022 4 0     Total Bilirubin 07/27/2022 0 47     eGFR 07/27/2022 104     Hemoglobin A1C 07/27/2022 6 3 (A)    EAG 07/27/2022 134     Cholesterol 07/27/2022 164     Triglycerides 07/27/2022 86     HDL, Direct 07/27/2022 51     LDL Calculated 07/27/2022 96     TSH 3RD GENERATON 07/27/2022 0 928          Radiology Results:   DXA bone density spine hip and pelvis    Result Date: 8/18/2022  Narrative: DXA SCAN CLINICAL HISTORY:  75-year-old postmenopausal female  OTHER RISK FACTORS:  PPI therapy, SSRI therapy  PHARMACOLOGIC THERAPY FOR OSTEOPOROSIS:  None  TECHNIQUE: Bone densitometry was performed using a Hologic Horizon A  bone densitometer  Regions of interest appear properly placed  COMPARISON: There are no prior DXA studies performed on this unit for comparison  RESULTS: LUMBAR SPINE Level: L1, L3  (L2, L4 vertebrae excluded from analysis due to local structural abnormalities or artifact) : BMD:  0 822  gm/cm2 T-score: -1 7 LEFT  TOTAL HIP: BMD:  0 942  gm/cm2 T-score:  0 0 LEFT  FEMORAL NECK: BMD:  0 72  gm/cm2 T score: -1 2 LEFT  FOREARM:  33% RADIUS BMD:  0 749  gm/cm2 T-score:  1 1     Impression: 1  Low bone mass (osteopenia)   2   The 10 year risk of hip fracture is 0 2% with the 10 year risk of major osteoporotic fracture being 4 9% as calculated by the [facility] fracture risk assessment tool (FRAX)  3   The current NOF guidelines recommend treating patients with a T-score of -2 5 or less in the lumbar spine or hips, or in post-menopausal women and men over the age of 48 with low bone mass (osteopenia) and a FRAX 10 year risk score of >3% for hip fracture and/or >20% for major osteoporotic fracture  4   The NOF recommends follow-up DXA in 1-2 years after initiating therapy for osteoporosis and every 2 years thereafter  More frequent evaluation is appropriate for patients with conditions associated with rapid bone loss, such as glucocorticoid therapy  The interval between DXA screenings may be longer for individuals without major risk factors and initial T-score in the normal or upper low bone mass range  The FRAX algorithm has certain limitations: -FRAX has not been validated in patients currently or previously treated with pharmacotherapy for osteoporosis  In such patients, clinical judgment must be exercised in interpreting FRAX scores  -Prior hip, vertebral and humeral fragility fractures appear to confer greater risk of subsequent fracture than fractures at other sites (this is especially true for individuals with severe vertebral fractures), but quantification of this incremental risk is not possible with FRAX  -FRAX underestimates fracture risk in patients with history of multiple fragility fractures  -FRAX may underestimate fracture risk in patients with history of frequent falls  -It is not appropriate to use FRAX to monitor treatment response   WHO CLASSIFICATION: Normal (a T-score of -1 0 or higher) Low bone mineral density (a T-score of less than -1 0 but higher than -2 5) Osteoporosis (a T-score of -2 5 or less) Severe osteoporosis (a T-score of -2 5 or less with a fragility fracture)  Workstation performed: [id number]

## 2022-09-06 ENCOUNTER — TELEPHONE (OUTPATIENT)
Dept: FAMILY MEDICINE CLINIC | Facility: CLINIC | Age: 52
End: 2022-09-06

## 2022-09-06 NOTE — TELEPHONE ENCOUNTER
----- Message from Sae Paez MD sent at 9/5/2022  4:38 PM EDT -----    DEXA scan showed osteopenia  I recommend vitamin-D with calcium

## 2022-09-06 NOTE — TELEPHONE ENCOUNTER
Lm for pt to check her my chart for results and provider recommendations   She is to call with any questions

## 2022-09-12 ENCOUNTER — TELEPHONE (OUTPATIENT)
Dept: FAMILY MEDICINE CLINIC | Facility: CLINIC | Age: 52
End: 2022-09-12

## 2022-09-12 DIAGNOSIS — R05.9 COUGH: ICD-10-CM

## 2022-09-12 NOTE — TELEPHONE ENCOUNTER
Pt positive yesterday and negative on Friday     symptoms are headache, head cold symptoms, and cough  Pt wants to know if we suggest the pill to help it resolve "quicker"  Pt had it back in 2020 and has had 3 shots so far  Alexandr Austin

## 2022-09-12 NOTE — TELEPHONE ENCOUNTER
PT c/o Head congestion, PND, Dry cough keeping her up at night, head ache  Pt using Tylenol and motrin, flonase , otc nightquil  With little to no relief  Pt feels like her congestion is draining into her chest      Allergies to Amoxicillin and cephalosporins    Discussed oral hydration, cont tylenol / ibuprofen, flonase  Mucinex PRN  Vaporizer  Discussed with Dr Isa Piper ; offered  prednisone and tessalon pearls  Pt is going to wait and tessalon pearla (did not help in past) and prednisone  She will call back if no improvement with OTC medications

## 2022-10-11 PROBLEM — J01.00 ACUTE NON-RECURRENT MAXILLARY SINUSITIS: Status: RESOLVED | Noted: 2022-02-25 | Resolved: 2022-10-11

## 2022-10-11 PROBLEM — R05.9 COUGH: Status: RESOLVED | Noted: 2022-02-25 | Resolved: 2022-10-11

## 2022-10-19 ENCOUNTER — TELEPHONE (OUTPATIENT)
Dept: PSYCHIATRY | Facility: CLINIC | Age: 52
End: 2022-10-19

## 2022-10-19 ENCOUNTER — OFFICE VISIT (OUTPATIENT)
Dept: FAMILY MEDICINE CLINIC | Facility: CLINIC | Age: 52
End: 2022-10-19
Payer: COMMERCIAL

## 2022-10-19 VITALS
OXYGEN SATURATION: 97 % | BODY MASS INDEX: 39.61 KG/M2 | WEIGHT: 232 LBS | HEIGHT: 64 IN | TEMPERATURE: 97.6 F | HEART RATE: 72 BPM | DIASTOLIC BLOOD PRESSURE: 78 MMHG | RESPIRATION RATE: 16 BRPM | SYSTOLIC BLOOD PRESSURE: 122 MMHG

## 2022-10-19 DIAGNOSIS — I10 ESSENTIAL HYPERTENSION: ICD-10-CM

## 2022-10-19 DIAGNOSIS — E11.9 TYPE 2 DIABETES MELLITUS WITHOUT COMPLICATION, WITHOUT LONG-TERM CURRENT USE OF INSULIN (HCC): Primary | ICD-10-CM

## 2022-10-19 DIAGNOSIS — Z00.00 HEALTHCARE MAINTENANCE: ICD-10-CM

## 2022-10-19 DIAGNOSIS — K22.70 BARRETT'S ESOPHAGUS WITHOUT DYSPLASIA: ICD-10-CM

## 2022-10-19 DIAGNOSIS — E78.5 HYPERLIPIDEMIA, UNSPECIFIED HYPERLIPIDEMIA TYPE: ICD-10-CM

## 2022-10-19 DIAGNOSIS — F32.89 OTHER DEPRESSION: ICD-10-CM

## 2022-10-19 DIAGNOSIS — J45.20 MILD INTERMITTENT ASTHMA WITHOUT COMPLICATION: ICD-10-CM

## 2022-10-19 LAB — SL AMB POCT HEMOGLOBIN AIC: 6.4 (ref ?–6.5)

## 2022-10-19 PROCEDURE — 99396 PREV VISIT EST AGE 40-64: CPT | Performed by: NURSE PRACTITIONER

## 2022-10-19 PROCEDURE — 83036 HEMOGLOBIN GLYCOSYLATED A1C: CPT | Performed by: NURSE PRACTITIONER

## 2022-10-19 RX ORDER — QUETIAPINE FUMARATE 150 MG/1
150 TABLET, FILM COATED ORAL
Qty: 90 TABLET | Refills: 0 | Status: SHIPPED | OUTPATIENT
Start: 2022-10-19

## 2022-10-19 RX ORDER — EMPAGLIFLOZIN, METFORMIN HYDROCHLORIDE 25; 1000 MG/1; MG/1
1 TABLET, EXTENDED RELEASE ORAL DAILY
Qty: 90 TABLET | Refills: 1 | Status: SHIPPED | OUTPATIENT
Start: 2022-10-19

## 2022-10-19 RX ORDER — ALBUTEROL SULFATE 90 UG/1
2 AEROSOL, METERED RESPIRATORY (INHALATION) EVERY 6 HOURS PRN
Qty: 18 G | Refills: 1 | Status: SHIPPED | OUTPATIENT
Start: 2022-10-19

## 2022-10-19 RX ORDER — REPAGLINIDE 1 MG/1
1 TABLET ORAL
Qty: 270 TABLET | Refills: 1 | Status: SHIPPED | OUTPATIENT
Start: 2022-10-19

## 2022-10-19 RX ORDER — ROSUVASTATIN CALCIUM 10 MG/1
10 TABLET, COATED ORAL DAILY
Qty: 90 TABLET | Refills: 0 | Status: SHIPPED | OUTPATIENT
Start: 2022-10-19

## 2022-10-19 RX ORDER — FLUTICASONE PROPIONATE 50 MCG
2 SPRAY, SUSPENSION (ML) NASAL DAILY
Qty: 16 G | Refills: 3 | Status: SHIPPED | OUTPATIENT
Start: 2022-10-19

## 2022-10-19 RX ORDER — LISINOPRIL 10 MG/1
10 TABLET ORAL DAILY
Qty: 90 TABLET | Refills: 0 | Status: SHIPPED | OUTPATIENT
Start: 2022-10-19

## 2022-10-19 NOTE — PROGRESS NOTES
Name: Amna Fragoso      : 1970      MRN: [id number]  Encounter Provider: GHISLAINE Wheeler  Encounter Date: 10/19/2022   Encounter department: [address] [place] [address]  Type 2 diabetes mellitus without complication, without long-term current use of insulin ([facility] )  Assessment & Plan:  - Well controlled  -  Continue prandin and Synjardy daily    - Discussed healthy diet and regular exercise  - DM foot exam performed in office today  - Will continue to monitor  Lab Results   Component Value Date    HGBA1C 6 4 10/19/2022       Orders:  -     POCT hemoglobin A1c  -     repaglinide (PRANDIN) 1 mg tablet; Take 1 tablet (1 mg total) by mouth 3 (three) times a day before meals  -     Empagliflozin-metFORMIN HCl ER (Synjardy XR)  MG TB24; Take 1 tablet by mouth daily  -     Hemoglobin A1C; Future    2  Other depression  Assessment & Plan:  - Well controlled on Zoloft and Seroquel  States she has been on these medications since   - Her Psychiatrist recently left the practice  Referred to new Psychiatrist    - Will refill medications until she can get an appointment  Orders:  -     Ambulatory Referral to Psychiatry; Future  -     QUEtiapine 150 MG TABS; Take 150 mg by mouth daily at bedtime  -     sertraline (ZOLOFT) 50 mg tablet; Take 1 tablet (50 mg total) by mouth daily    3  Hyperlipidemia, unspecified hyperlipidemia type  Comments:  Continue Crestor  Will check lipid panel  Assessment & Plan:  - Well controlled  - Continue Crestor    - Will continue to monitor fasting lipid panel  Orders:  -     rosuvastatin (CRESTOR) 10 MG tablet; Take 1 tablet (10 mg total) by mouth daily  -     Lipid Panel with Direct LDL reflex; Future    4  Fiore's esophagus without dysplasia  Assessment & Plan:  - Stable  - Continue omeprazole daily     - Avoid triggering food and beverage    - Continue routine follow up with GI        5  Essential hypertension  Assessment & Plan:  - Well controlled on lisinopril daily  Continue same    - Will continue to monitor  Orders:  -     lisinopril (ZESTRIL) 10 mg tablet; Take 1 tablet (10 mg total) by mouth daily    6  Mild intermittent asthma without complication  Assessment & Plan:  - Well controlled  - Continue albuterol as needed  - Will continue to monitor  Orders:  -     fluticasone (FLONASE) 50 mcg/act nasal spray; 2 sprays into each nostril daily  -     albuterol (PROVENTIL HFA,VENTOLIN HFA) 90 mcg/act inhaler; Inhale 2 puffs every 6 (six) hours as needed for wheezing or shortness of breath    7  Healthcare maintenance  Assessment & Plan:  - Reviewed immunizations and screenings  - Already got flu shot at work  - UTD with vision and GYN exams  Needs to find a new dentist    - UTD with mammogram and colonoscopy  - Routine labs ordered for patient to obtain prior to follow up in 3 months  Orders:  -     CBC and differential; Future  -     Comprehensive metabolic panel; Future  -     Lipid Panel with Direct LDL reflex; Future  -     TSH, 3rd generation with Free T4 reflex; Future           Subjective     Patient with PMH of GERD, HTN, type 2 diabetes, hyperlipidemia, and depression presents today for annual physical  She is taking her prescribed medications and reports no side effects  She has been seeing a Psychiatrist for her depression who recently left the practice  She is in search of a new Psychiatrist  Her A1c is well controlled today at 6 4  She has no other concerns or complaints today  Review of Systems   Constitutional: Negative for fatigue and fever  HENT: Negative for congestion, rhinorrhea and trouble swallowing  Eyes: Negative for visual disturbance  Respiratory: Negative for cough and shortness of breath  Cardiovascular: Negative for chest pain and palpitations  Gastrointestinal: Negative for abdominal pain and blood in stool     Endocrine: Negative for cold intolerance and heat intolerance  Genitourinary: Negative for difficulty urinating, dysuria and frequency  Musculoskeletal: Negative for gait problem  Skin: Negative for rash  Neurological: Negative for dizziness, syncope and headaches  Hematological: Negative for adenopathy  Psychiatric/Behavioral: Negative for behavioral problems         Past Medical History:   Diagnosis Date   • Asthma     exercise induced   • Colon polyp    • COVID-19    • Diabetes mellitus ([facility] [address] )    • Encounter for screening for malignant neoplasm of colon 2019   • GERD (gastroesophageal reflux disease)    • Psychiatric disorder     depression     Past Surgical History:   Procedure Laterality Date   • COLONOSCOPY     • UPPER GASTROINTESTINAL ENDOSCOPY       Family History   Problem Relation Age of Onset   • Hypertension Mother    • Diabetes type II Mother    • No Known Problems Father    • Breast cancer Sister 40   • BRCA1 Negative Sister    • BRCA2 Negative Sister    • Breast cancer Maternal Aunt         age unknown   • Breast cancer Maternal Aunt         age unknown   • Breast cancer Maternal Aunt         age unknown   • No Known Problems Maternal Grandmother    • No Known Problems Maternal Grandfather    • No Known Problems Paternal Grandmother    • No Known Problems Paternal Grandfather    • No Known Problems Son    • No Known Problems Sister    • No Known Problems Sister      Social History     Socioeconomic History   • Marital status: /Civil Union     Spouse name: None   • Number of children: None   • Years of education: None   • Highest education level: None   Occupational History   • None   Tobacco Use   • Smoking status: Former Smoker     Quit date: 1994     Years since quittin 8   • Smokeless tobacco: Never Used   • Tobacco comment: No passive smoke exposure   Vaping Use   • Vaping Use: Never used   Substance and Sexual Activity   • Alcohol use: Never   • Drug use: Never   • Sexual activity: None   Other Topics Concern   • None   Social History Narrative   • None     Social Determinants of Health     Financial Resource Strain: Not on file   Food Insecurity: Not on file   Transportation Needs: Not on file   Physical Activity: Not on file   Stress: Not on file   Social Connections: Not on file   Intimate Partner Violence: Not on file   Housing Stability: Not on file     Current Outpatient Medications on File Prior to Visit   Medication Sig   • norethindrone (MICRONOR) 0 35 MG tablet Take 1 tablet by mouth daily   • omeprazole (PriLOSEC) 40 MG capsule Take 1 capsule (40 mg total) by mouth daily   • [DISCONTINUED] albuterol (PROVENTIL HFA,VENTOLIN HFA) 90 mcg/act inhaler Inhale 2 puffs every 6 (six) hours as needed for wheezing or shortness of breath   • [DISCONTINUED] Empagliflozin-metFORMIN HCl ER (Synjardy XR)  MG TB24 Take 1 tablet by mouth daily   • [DISCONTINUED] fluticasone (FLONASE) 50 mcg/act nasal spray 2 sprays into each nostril daily   • [DISCONTINUED] lisinopril (ZESTRIL) 10 mg tablet Take 1 tablet (10 mg total) by mouth daily   • [DISCONTINUED] QUEtiapine (SEROQUEL) 200 mg tablet Take 150 mg by mouth daily at bedtime    • [DISCONTINUED] repaglinide (PRANDIN) 1 mg tablet TAKE 1 TABLET BY MOUTH 3 TIMES A DAY BEFORE MEALS   • [DISCONTINUED] rosuvastatin (CRESTOR) 10 MG tablet Take 1 tablet (10 mg total) by mouth daily   • [DISCONTINUED] sertraline (ZOLOFT) 50 mg tablet Take 50 mg by mouth daily   • [DISCONTINUED] benzonatate (TESSALON) 200 MG capsule Take 1 capsule (200 mg total) by mouth 3 (three) times a day as needed for cough   • [DISCONTINUED] cyclobenzaprine (FLEXERIL) 10 mg tablet Take 1 tablet (10 mg total) by mouth 3 (three) times a day as needed for muscle spasms   • [DISCONTINUED] Na Sulfate-K Sulfate-Mg Sulf (SUPREP BOWEL PREP KIT) 17 5-3 13-1 6 GM/177ML SOLN Take 1 Bottle by mouth once for 1 dose     Allergies   Allergen Reactions   • Cephalosporins Hives   • Amoxicillin Hives Immunization History   Administered Date(s) Administered   • COVID-19 PFIZER VACCINE 0 3 ML IM 01/15/2021, 02/05/2021, 12/16/2021   • INFLUENZA 10/03/2016, 09/30/2018, 11/06/2020   • Influenza, injectable, quadrivalent, preservative free 0 5 mL 09/30/2018, 09/29/2019, 11/06/2020   • Pneumococcal Polysaccharide PPV23 10/18/2016   • Tdap 10/18/2016   • Zoster Vaccine Recombinant 02/09/2022, 07/27/2022       Objective     /78 (BP Location: Left arm, Patient Position: Sitting, Cuff Size: Standard)   Pulse 72   Temp 97 6 °F (36 4 °C) (Tympanic)   Resp 16   Ht 5' 4" (1 626 m)   Wt 105 kg (232 lb)   LMP 02/18/2021   SpO2 97%   BMI 39 82 kg/m²     Physical Exam  Vitals and nursing note reviewed  Constitutional:       General: She is not in acute distress  Appearance: Normal appearance  She is not ill-appearing  HENT:      Head: Normocephalic and atraumatic  Right Ear: Tympanic membrane, ear canal and external ear normal       Left Ear: Tympanic membrane, ear canal and external ear normal       Nose: No congestion  Eyes:      Extraocular Movements: Extraocular movements intact  Conjunctiva/sclera: Conjunctivae normal       Pupils: Pupils are equal, round, and reactive to light  Cardiovascular:      Rate and Rhythm: Normal rate and regular rhythm  Pulses:           Dorsalis pedis pulses are 2+ on the right side and 2+ on the left side  Heart sounds: Normal heart sounds  Pulmonary:      Effort: Pulmonary effort is normal       Breath sounds: Normal breath sounds  Abdominal:      General: Bowel sounds are normal       Palpations: Abdomen is soft  Musculoskeletal:         General: Normal range of motion  Cervical back: Normal range of motion  Lymphadenopathy:      Cervical: No cervical adenopathy  Skin:     General: Skin is warm and dry  Neurological:      Mental Status: She is alert and oriented to person, place, and time        Cranial Nerves: No cranial nerve deficit     Psychiatric:         Mood and Affect: Mood normal          Behavior: Behavior normal        GHISLAINE Moctezuma

## 2022-10-19 NOTE — ASSESSMENT & PLAN NOTE
- Stable  - Continue omeprazole daily     - Avoid triggering food and beverage    - Continue routine follow up with GI

## 2022-10-19 NOTE — ASSESSMENT & PLAN NOTE
- Well controlled on Zoloft and Seroquel  States she has been on these medications since 2007  - Her Psychiatrist recently left the practice  Referred to new Psychiatrist    - Will refill medications until she can get an appointment

## 2022-10-19 NOTE — ASSESSMENT & PLAN NOTE
- Reviewed immunizations and screenings  - Already got flu shot at work  - UTD with vision and GYN exams  Needs to find a new dentist    - UTD with mammogram and colonoscopy  - Routine labs ordered for patient to obtain prior to follow up in 3 months

## 2022-10-19 NOTE — ASSESSMENT & PLAN NOTE
- Well controlled  -  Continue prandin and Synjardy daily    - Discussed healthy diet and regular exercise  - DM foot exam performed in office today  - Will continue to monitor     Lab Results   Component Value Date    HGBA1C 6 4 10/19/2022

## 2022-11-29 DIAGNOSIS — E78.5 HYPERLIPIDEMIA, UNSPECIFIED HYPERLIPIDEMIA TYPE: ICD-10-CM

## 2022-11-29 RX ORDER — ROSUVASTATIN CALCIUM 10 MG/1
10 TABLET, COATED ORAL DAILY
Qty: 90 TABLET | Refills: 0 | Status: SHIPPED | OUTPATIENT
Start: 2022-11-29

## 2023-01-18 ENCOUNTER — APPOINTMENT (OUTPATIENT)
Dept: LAB | Facility: IMAGING CENTER | Age: 53
End: 2023-01-18

## 2023-01-18 ENCOUNTER — OFFICE VISIT (OUTPATIENT)
Dept: FAMILY MEDICINE CLINIC | Facility: CLINIC | Age: 53
End: 2023-01-18

## 2023-01-18 VITALS
HEIGHT: 64 IN | HEART RATE: 78 BPM | SYSTOLIC BLOOD PRESSURE: 120 MMHG | WEIGHT: 232.2 LBS | BODY MASS INDEX: 39.64 KG/M2 | OXYGEN SATURATION: 97 % | RESPIRATION RATE: 16 BRPM | TEMPERATURE: 97.8 F | DIASTOLIC BLOOD PRESSURE: 70 MMHG

## 2023-01-18 DIAGNOSIS — E11.9 TYPE 2 DIABETES MELLITUS WITHOUT COMPLICATION, WITHOUT LONG-TERM CURRENT USE OF INSULIN (HCC): Primary | ICD-10-CM

## 2023-01-18 DIAGNOSIS — M54.2 NECK PAIN: ICD-10-CM

## 2023-01-18 DIAGNOSIS — Z00.00 HEALTHCARE MAINTENANCE: ICD-10-CM

## 2023-01-18 DIAGNOSIS — E11.621 DIABETIC ULCER OF FOOT ASSOCIATED WITH TYPE 2 DIABETES MELLITUS, WITH MUSCLE INVOLVEMENT WITHOUT EVIDENCE OF NECROSIS, UNSPECIFIED LATERALITY, UNSPECIFIED PART OF FOOT (HCC): ICD-10-CM

## 2023-01-18 DIAGNOSIS — E11.9 TYPE 2 DIABETES MELLITUS WITHOUT COMPLICATION, WITHOUT LONG-TERM CURRENT USE OF INSULIN (HCC): ICD-10-CM

## 2023-01-18 DIAGNOSIS — F32.89 OTHER DEPRESSION: ICD-10-CM

## 2023-01-18 DIAGNOSIS — E78.5 HYPERLIPIDEMIA, UNSPECIFIED HYPERLIPIDEMIA TYPE: ICD-10-CM

## 2023-01-18 DIAGNOSIS — L97.505 DIABETIC ULCER OF FOOT ASSOCIATED WITH TYPE 2 DIABETES MELLITUS, WITH MUSCLE INVOLVEMENT WITHOUT EVIDENCE OF NECROSIS, UNSPECIFIED LATERALITY, UNSPECIFIED PART OF FOOT (HCC): ICD-10-CM

## 2023-01-18 DIAGNOSIS — I10 ESSENTIAL HYPERTENSION: ICD-10-CM

## 2023-01-18 DIAGNOSIS — R51.9 NONINTRACTABLE HEADACHE, UNSPECIFIED CHRONICITY PATTERN, UNSPECIFIED HEADACHE TYPE: ICD-10-CM

## 2023-01-18 DIAGNOSIS — K22.70 BARRETT'S ESOPHAGUS WITHOUT DYSPLASIA: ICD-10-CM

## 2023-01-18 DIAGNOSIS — B36.9 FUNGAL DERMATITIS: ICD-10-CM

## 2023-01-18 LAB
ALBUMIN SERPL BCP-MCNC: 4.1 G/DL (ref 3.5–5)
ALP SERPL-CCNC: 70 U/L (ref 46–116)
ALT SERPL W P-5'-P-CCNC: 29 U/L (ref 12–78)
ANION GAP SERPL CALCULATED.3IONS-SCNC: 6 MMOL/L (ref 4–13)
AST SERPL W P-5'-P-CCNC: 14 U/L (ref 5–45)
BASOPHILS # BLD AUTO: 0.04 THOUSANDS/ÂΜL (ref 0–0.1)
BASOPHILS NFR BLD AUTO: 1 % (ref 0–1)
BILIRUB SERPL-MCNC: 0.55 MG/DL (ref 0.2–1)
BUN SERPL-MCNC: 12 MG/DL (ref 5–25)
CALCIUM SERPL-MCNC: 9.4 MG/DL (ref 8.3–10.1)
CHLORIDE SERPL-SCNC: 107 MMOL/L (ref 96–108)
CHOLEST SERPL-MCNC: 162 MG/DL
CO2 SERPL-SCNC: 27 MMOL/L (ref 21–32)
CREAT SERPL-MCNC: 0.54 MG/DL (ref 0.6–1.3)
CREAT UR-MCNC: 56.7 MG/DL
EOSINOPHIL # BLD AUTO: 0.16 THOUSAND/ÂΜL (ref 0–0.61)
EOSINOPHIL NFR BLD AUTO: 2 % (ref 0–6)
ERYTHROCYTE [DISTWIDTH] IN BLOOD BY AUTOMATED COUNT: 12.8 % (ref 11.6–15.1)
EST. AVERAGE GLUCOSE BLD GHB EST-MCNC: 128 MG/DL
GFR SERPL CREATININE-BSD FRML MDRD: 107 ML/MIN/1.73SQ M
GLUCOSE P FAST SERPL-MCNC: 120 MG/DL (ref 65–99)
HBA1C MFR BLD: 6.1 %
HCT VFR BLD AUTO: 45 % (ref 34.8–46.1)
HDLC SERPL-MCNC: 55 MG/DL
HGB BLD-MCNC: 14.4 G/DL (ref 11.5–15.4)
IMM GRANULOCYTES # BLD AUTO: 0.01 THOUSAND/UL (ref 0–0.2)
IMM GRANULOCYTES NFR BLD AUTO: 0 % (ref 0–2)
LDLC SERPL CALC-MCNC: 89 MG/DL (ref 0–100)
LYMPHOCYTES # BLD AUTO: 2.57 THOUSANDS/ÂΜL (ref 0.6–4.47)
LYMPHOCYTES NFR BLD AUTO: 38 % (ref 14–44)
MCH RBC QN AUTO: 29.6 PG (ref 26.8–34.3)
MCHC RBC AUTO-ENTMCNC: 32 G/DL (ref 31.4–37.4)
MCV RBC AUTO: 93 FL (ref 82–98)
MICROALBUMIN UR-MCNC: 7.8 MG/L (ref 0–20)
MICROALBUMIN/CREAT 24H UR: 14 MG/G CREATININE (ref 0–30)
MONOCYTES # BLD AUTO: 0.37 THOUSAND/ÂΜL (ref 0.17–1.22)
MONOCYTES NFR BLD AUTO: 6 % (ref 4–12)
NEUTROPHILS # BLD AUTO: 3.63 THOUSANDS/ÂΜL (ref 1.85–7.62)
NEUTS SEG NFR BLD AUTO: 53 % (ref 43–75)
NRBC BLD AUTO-RTO: 0 /100 WBCS
PLATELET # BLD AUTO: 236 THOUSANDS/UL (ref 149–390)
PMV BLD AUTO: 13 FL (ref 8.9–12.7)
POTASSIUM SERPL-SCNC: 4.2 MMOL/L (ref 3.5–5.3)
PROT SERPL-MCNC: 7.6 G/DL (ref 6.4–8.4)
RBC # BLD AUTO: 4.86 MILLION/UL (ref 3.81–5.12)
SODIUM SERPL-SCNC: 140 MMOL/L (ref 135–147)
TRIGL SERPL-MCNC: 91 MG/DL
TSH SERPL DL<=0.05 MIU/L-ACNC: 1.8 UIU/ML (ref 0.45–4.5)
WBC # BLD AUTO: 6.78 THOUSAND/UL (ref 4.31–10.16)

## 2023-01-18 RX ORDER — OMEPRAZOLE 40 MG/1
40 CAPSULE, DELAYED RELEASE ORAL DAILY
Qty: 90 CAPSULE | Refills: 3 | Status: SHIPPED | OUTPATIENT
Start: 2023-01-18

## 2023-01-18 RX ORDER — ROSUVASTATIN CALCIUM 10 MG/1
10 TABLET, COATED ORAL DAILY
Qty: 90 TABLET | Refills: 0 | Status: SHIPPED | OUTPATIENT
Start: 2023-01-18

## 2023-01-18 RX ORDER — IBUPROFEN 600 MG/1
600 TABLET ORAL EVERY 6 HOURS PRN
Qty: 30 TABLET | Refills: 0 | Status: SHIPPED | OUTPATIENT
Start: 2023-01-18

## 2023-01-18 RX ORDER — CLOTRIMAZOLE AND BETAMETHASONE DIPROPIONATE 10; .64 MG/G; MG/G
CREAM TOPICAL 2 TIMES DAILY
Qty: 30 G | Refills: 0 | Status: SHIPPED | OUTPATIENT
Start: 2023-01-18

## 2023-01-18 RX ORDER — QUETIAPINE FUMARATE 150 MG/1
150 TABLET, FILM COATED ORAL
Qty: 90 TABLET | Refills: 0 | Status: SHIPPED | OUTPATIENT
Start: 2023-01-18

## 2023-01-18 RX ORDER — LISINOPRIL 10 MG/1
10 TABLET ORAL DAILY
Qty: 90 TABLET | Refills: 0 | Status: SHIPPED | OUTPATIENT
Start: 2023-01-18

## 2023-01-18 RX ORDER — REPAGLINIDE 1 MG/1
1 TABLET ORAL
Qty: 270 TABLET | Refills: 1 | Status: SHIPPED | OUTPATIENT
Start: 2023-01-18

## 2023-01-18 RX ORDER — EMPAGLIFLOZIN, METFORMIN HYDROCHLORIDE 25; 1000 MG/1; MG/1
1 TABLET, EXTENDED RELEASE ORAL DAILY
Qty: 90 TABLET | Refills: 1 | Status: SHIPPED | OUTPATIENT
Start: 2023-01-18

## 2023-01-18 RX ORDER — CYCLOBENZAPRINE HCL 5 MG
5 TABLET ORAL 3 TIMES DAILY PRN
Qty: 60 TABLET | Refills: 0 | Status: SHIPPED | OUTPATIENT
Start: 2023-01-18

## 2023-01-18 NOTE — PROGRESS NOTES
Name: Breanne Ibrahim      : 1970      MRN: [id number]  Encounter Provider: GHISLAINE Kim  Encounter Date: 2023   Encounter department: [address] [place] [address]  Type 2 diabetes mellitus without complication, without long-term current use of insulin ([place] [address] )  Assessment & Plan:  - Well controlled  - Continue current medication regimen  - Urine microalbumin obtained in office today  - Recommend follow up in 3 months  Lab Results   Component Value Date    BA1C 6 4 10/19/2022       Orders:  -     Microalbumin / creatinine urine ratio; Future; Expected date: 2023  -     Microalbumin / creatinine urine ratio  -     repaglinide (PRANDIN) 1 mg tablet; Take 1 tablet (1 mg total) by mouth 3 (three) times a day before meals  -     Empagliflozin-metFORMIN HCl ER (Synjardy XR)  MG TB24; Take 1 tablet by mouth daily    2  Diabetic ulcer of foot associated with type 2 diabetes mellitus, with muscle involvement without evidence of necrosis, unspecified laterality, unspecified part of foot ([place] [address] )  Assessment & Plan:  - Resolved  Lab Results   Component Value Date    [facility]1C 6 4 10/19/2022         3  Neck pain  Assessment & Plan:  - Prescription sent for Ibuprofen and flexeril  Advised of side effects   - Consider referral to PT/Ortho if no improvement  Orders:  -     cyclobenzaprine (FLEXERIL) 5 mg tablet; Take 1 tablet (5 mg total) by mouth 3 (three) times a day as needed for muscle spasms  -     ibuprofen (MOTRIN) 600 mg tablet; Take 1 tablet (600 mg total) by mouth every 6 (six) hours as needed for mild pain or headaches    4  Nonintractable headache, unspecified chronicity pattern, unspecified headache type  Assessment & Plan:  - Advised Ibuprofen and muscle relaxer may help headache as well as neck pain  - Increase oral hydration    - Will continue to monitor  Orders:  -     ibuprofen (MOTRIN) 600 mg tablet;  Take 1 tablet (600 mg total) by mouth every 6 (six) hours as needed for mild pain or headaches    5  Fungal dermatitis  Assessment & Plan:  - Prescription sent for lotrisone cream    - Keep area clean and dry  - Contact office if rash does not improve  Orders:  -     clotrimazole-betamethasone (LOTRISONE) 1-0 05 % cream; Apply topically 2 (two) times a day    6  Other depression  Assessment & Plan:  - Well controlled on Zoloft and Seroquel   - Still trying to get in to see psychiatrist    - Will continue to monitor  Orders:  -     sertraline (ZOLOFT) 50 mg tablet; Take 1 tablet (50 mg total) by mouth daily  -     QUEtiapine Fumarate 150 MG TABS; Take 150 mg by mouth daily at bedtime    7  Hyperlipidemia, unspecified hyperlipidemia type  Comments:  Continue Crestor  Will check lipid panel  Assessment & Plan:  Component      Latest Ref Rng & Units 9/29/2021 7/27/2022   Cholesterol      See Comment mg/dL 153 164   Triglycerides      See Comment mg/dL 100 86   HDL      >=50 mg/dL 43 51   LDL Calculated      0 - 100 mg/dL 90 96     - Well controlled  - Results of updated lipid panel currently pending   - Continue Crestor daily in addition to healthy diet and regular exercise  Orders:  -     rosuvastatin (CRESTOR) 10 MG tablet; Take 1 tablet (10 mg total) by mouth daily    8  Fiore's esophagus without dysplasia  Assessment & Plan:  - Stable  - Continue Prilosec daily  - Will continue to monitor  Orders:  -     omeprazole (PriLOSEC) 40 MG capsule; Take 1 capsule (40 mg total) by mouth daily    9  Essential hypertension  Assessment & Plan:  - Well controlled on lisinopril daily  Continue same   - Will continue to monitor  Orders:  -     lisinopril (ZESTRIL) 10 mg tablet; Take 1 tablet (10 mg total) by mouth daily           Subjective     Patient presents today with complaints of neck pain and headache for the past month  She was seen at her chiropractor and received a deep tissue massage and has had the headache since then    She does endorse some nausea but denies any vomiting, sensitivity to light, or sensitivity to sound  Denies any history of migraines  She thought the headache was related to a sinus infection so she had someone at work write her a script for a [id number]-[id number] which she took 2 weeks ago  Has been taking OTC Tylenol with no relief  Also has complaints today of a rash under her armpits  Review of Systems   Constitutional: Negative for fatigue and fever  HENT: Negative for congestion, sinus pressure, sinus pain and trouble swallowing  Eyes: Negative for visual disturbance  Respiratory: Negative for cough and shortness of breath  Cardiovascular: Negative for chest pain and palpitations  Gastrointestinal: Negative for abdominal pain and blood in stool  Endocrine: Negative for cold intolerance and heat intolerance  Genitourinary: Negative for difficulty urinating and dysuria  Musculoskeletal: Positive for neck pain  Negative for gait problem  Skin: Positive for rash  Neurological: Positive for headaches  Negative for dizziness and syncope  Hematological: Negative for adenopathy  Psychiatric/Behavioral: Negative for behavioral problems         Past Medical History:   Diagnosis Date   • Asthma     exercise induced   • Colon polyp    • COVID-19    • Diabetes mellitus ([facility] [address] )    • Encounter for screening for malignant neoplasm of colon 5/7/2019   • GERD (gastroesophageal reflux disease)    • Psychiatric disorder     depression     Past Surgical History:   Procedure Laterality Date   • COLONOSCOPY     • UPPER GASTROINTESTINAL ENDOSCOPY       Family History   Problem Relation Age of Onset   • Hypertension Mother    • Diabetes type II Mother    • No Known Problems Father    • Breast cancer Sister 40   • BRCA1 Negative Sister    • BRCA2 Negative Sister    • Breast cancer Maternal Aunt         age unknown   • Breast cancer Maternal Aunt         age unknown   • Breast cancer Maternal Aunt         age unknown   • No Known Problems Maternal Grandmother    • No Known Problems Maternal Grandfather    • No Known Problems Paternal Grandmother    • No Known Problems Paternal Grandfather    • No Known Problems Son    • No Known Problems Sister    • No Known Problems Sister      Social History     Socioeconomic History   • Marital status: /Civil Union     Spouse name: None   • Number of children: None   • Years of education: None   • Highest education level: None   Occupational History   • None   Tobacco Use   • Smoking status: Former     Types: Cigarettes     Quit date: 1994     Years since quittin 0   • Smokeless tobacco: Never   • Tobacco comments:     No passive smoke exposure   Vaping Use   • Vaping Use: Never used   Substance and Sexual Activity   • Alcohol use: Never   • Drug use: Never   • Sexual activity: None   Other Topics Concern   • None   Social History Narrative   • None     Social Determinants of Health     Financial Resource Strain: Not on file   Food Insecurity: Not on file   Transportation Needs: Not on file   Physical Activity: Not on file   Stress: Not on file   Social Connections: Not on file   Intimate Partner Violence: Not on file   Housing Stability: Not on file     Current Outpatient Medications on File Prior to Visit   Medication Sig   • albuterol (PROVENTIL HFA,VENTOLIN HFA) 90 mcg/act inhaler Inhale 2 puffs every 6 (six) hours as needed for wheezing or shortness of breath   • fluticasone (FLONASE) 50 mcg/act nasal spray 2 sprays into each nostril daily   • norethindrone (MICRONOR) 0 35 MG tablet Take 1 tablet by mouth daily   • [DISCONTINUED] Empagliflozin-metFORMIN HCl ER (Synjardy XR)  MG TB24 Take 1 tablet by mouth daily   • [DISCONTINUED] lisinopril (ZESTRIL) 10 mg tablet Take 1 tablet (10 mg total) by mouth daily   • [DISCONTINUED] omeprazole (PriLOSEC) 40 MG capsule Take 1 capsule (40 mg total) by mouth daily   • [DISCONTINUED] QUEtiapine 150 MG TABS Take 150 mg by mouth daily at bedtime   • [DISCONTINUED] repaglinide (PRANDIN) 1 mg tablet Take 1 tablet (1 mg total) by mouth 3 (three) times a day before meals   • [DISCONTINUED] rosuvastatin (CRESTOR) 10 MG tablet Take 1 tablet (10 mg total) by mouth daily   • [DISCONTINUED] sertraline (ZOLOFT) 50 mg tablet Take 1 tablet (50 mg total) by mouth daily     Allergies   Allergen Reactions   • Cephalosporins Hives   • Amoxicillin Hives     Immunization History   Administered Date(s) Administered   • COVID-19 PFIZER VACCINE 0 3 ML IM 01/15/2021, 02/05/2021, 12/16/2021   • INFLUENZA 10/03/2016, 09/30/2018, 11/06/2020   • Influenza, injectable, quadrivalent, preservative free 0 5 mL 09/30/2018, 09/29/2019, 11/06/2020   • Pneumococcal Polysaccharide PPV23 10/18/2016   • Tdap 10/18/2016   • Zoster Vaccine Recombinant 02/09/2022, 07/27/2022       Objective     /70 (BP Location: Left arm, Patient Position: Sitting, Cuff Size: Standard)   Pulse 78   Temp 97 8 °F (36 6 °C) (Tympanic)   Resp 16   Ht 5' 4" (1 626 m)   Wt 105 kg (232 lb 3 2 oz)   LMP 02/18/2021   SpO2 97%   BMI 39 86 kg/m²     Physical Exam  Vitals and nursing note reviewed  Constitutional:       General: She is not in acute distress  Appearance: Normal appearance  She is not ill-appearing  HENT:      Head: Normocephalic and atraumatic  Right Ear: External ear normal       Left Ear: External ear normal    Eyes:      Conjunctiva/sclera: Conjunctivae normal    Cardiovascular:      Rate and Rhythm: Normal rate and regular rhythm  Heart sounds: Normal heart sounds  Pulmonary:      Effort: Pulmonary effort is normal       Breath sounds: Normal breath sounds  Musculoskeletal:         General: Normal range of motion  Cervical back: Normal range of motion  Skin:     General: Skin is warm and dry  Neurological:      Mental Status: She is alert and oriented to person, place, and time  Cranial Nerves: No cranial nerve deficit     Psychiatric: Mood and Affect: Mood normal          Behavior: Behavior normal        GHISLAINE Sow

## 2023-01-18 NOTE — ASSESSMENT & PLAN NOTE
Component      Latest Ref Rng & Units 9/29/2021 7/27/2022   Cholesterol      See Comment mg/dL 153 164   Triglycerides      See Comment mg/dL 100 86   HDL      >=50 mg/dL 43 51   LDL Calculated      0 - 100 mg/dL 90 96     - Well controlled  - Results of updated lipid panel currently pending   - Continue Crestor daily in addition to healthy diet and regular exercise

## 2023-01-18 NOTE — ASSESSMENT & PLAN NOTE
- Prescription sent for lotrisone cream    - Keep area clean and dry  - Contact office if rash does not improve

## 2023-01-18 NOTE — ASSESSMENT & PLAN NOTE
- Prescription sent for Ibuprofen and flexeril  Advised of side effects   - Consider referral to PT/Ortho if no improvement

## 2023-01-18 NOTE — ASSESSMENT & PLAN NOTE
- Well controlled  - Continue current medication regimen  - Urine microalbumin obtained in office today  - Recommend follow up in 3 months     Lab Results   Component Value Date    HGBA1C 6 4 10/19/2022

## 2023-01-18 NOTE — ASSESSMENT & PLAN NOTE
- Advised Ibuprofen and muscle relaxer may help headache as well as neck pain  - Increase oral hydration    - Will continue to monitor

## 2023-01-18 NOTE — ASSESSMENT & PLAN NOTE
- Well controlled on Zoloft and Seroquel   - Still trying to get in to see psychiatrist    - Will continue to monitor

## 2023-01-20 ENCOUNTER — TELEPHONE (OUTPATIENT)
Dept: FAMILY MEDICINE CLINIC | Facility: CLINIC | Age: 53
End: 2023-01-20

## 2023-01-20 NOTE — TELEPHONE ENCOUNTER
----- Message from [address] [employer] [facility] sent at 1/19/2023 10:23 AM EST -----  Labs show elevated fasting glucose but a1c is well controlled  Rest of labs are stable

## 2023-03-08 NOTE — ADDENDUM NOTE
Addended by: Paco Snyder on: 7/14/2021 04:27 PM     Modules accepted: Orders PS sent to Hospitalist: Jonathan Canseco)    Couple things with this patient. She had surgery yesterday afternoon (midline incision and lap wounds) is NPO except ice chips. She has complaints of her throat hurting. Could I have some throat spray for her? Also, for pain she has her normal medication is what she has which is percocet 7.5/325mg. It is ordered q8 and she got it about midnight. Is there something different I can give her? Her BP is 95/57. Lastly, her glucose last night was 372 and is now 368. The 368 was a spot check. Her next scheduled glucose check is for breakfast. Do you want me to leave this where it is or make an intervention? Please advise, thank you.

## 2023-03-28 ENCOUNTER — TELEPHONE (OUTPATIENT)
Dept: PSYCHIATRY | Facility: CLINIC | Age: 53
End: 2023-03-28

## 2023-04-25 PROBLEM — L97.505: Status: RESOLVED | Noted: 2023-01-18 | Resolved: 2023-04-25

## 2023-04-25 PROBLEM — E11.621: Status: RESOLVED | Noted: 2023-01-18 | Resolved: 2023-04-25

## 2023-04-26 ENCOUNTER — OFFICE VISIT (OUTPATIENT)
Dept: FAMILY MEDICINE CLINIC | Facility: CLINIC | Age: 53
End: 2023-04-26

## 2023-04-26 VITALS
BODY MASS INDEX: 39.09 KG/M2 | DIASTOLIC BLOOD PRESSURE: 70 MMHG | OXYGEN SATURATION: 94 % | TEMPERATURE: 98 F | WEIGHT: 229 LBS | RESPIRATION RATE: 16 BRPM | HEART RATE: 90 BPM | HEIGHT: 64 IN | SYSTOLIC BLOOD PRESSURE: 116 MMHG

## 2023-04-26 DIAGNOSIS — F32.89 OTHER DEPRESSION: ICD-10-CM

## 2023-04-26 DIAGNOSIS — I10 ESSENTIAL HYPERTENSION: ICD-10-CM

## 2023-04-26 DIAGNOSIS — J30.9 ALLERGIC RHINITIS, UNSPECIFIED SEASONALITY, UNSPECIFIED TRIGGER: ICD-10-CM

## 2023-04-26 DIAGNOSIS — K22.70 BARRETT'S ESOPHAGUS WITHOUT DYSPLASIA: ICD-10-CM

## 2023-04-26 DIAGNOSIS — E11.9 TYPE 2 DIABETES MELLITUS WITHOUT COMPLICATION, WITHOUT LONG-TERM CURRENT USE OF INSULIN (HCC): Primary | ICD-10-CM

## 2023-04-26 DIAGNOSIS — E78.5 HYPERLIPIDEMIA, UNSPECIFIED HYPERLIPIDEMIA TYPE: ICD-10-CM

## 2023-04-26 DIAGNOSIS — J45.20 MILD INTERMITTENT ASTHMA WITHOUT COMPLICATION: ICD-10-CM

## 2023-04-26 RX ORDER — FLUTICASONE PROPIONATE 50 MCG
2 SPRAY, SUSPENSION (ML) NASAL DAILY
Qty: 16 G | Refills: 1 | Status: SHIPPED | OUTPATIENT
Start: 2023-04-26

## 2023-04-26 RX ORDER — REPAGLINIDE 1 MG/1
1 TABLET ORAL
Qty: 270 TABLET | Refills: 1 | Status: SHIPPED | OUTPATIENT
Start: 2023-04-26

## 2023-04-26 RX ORDER — ALBUTEROL SULFATE 90 UG/1
2 AEROSOL, METERED RESPIRATORY (INHALATION) EVERY 6 HOURS PRN
Qty: 18 G | Refills: 1 | Status: SHIPPED | OUTPATIENT
Start: 2023-04-26

## 2023-04-26 RX ORDER — OMEPRAZOLE 40 MG/1
40 CAPSULE, DELAYED RELEASE ORAL DAILY
Qty: 90 CAPSULE | Refills: 1 | Status: SHIPPED | OUTPATIENT
Start: 2023-04-26

## 2023-04-26 RX ORDER — ROSUVASTATIN CALCIUM 10 MG/1
10 TABLET, COATED ORAL DAILY
Qty: 90 TABLET | Refills: 0 | Status: SHIPPED | OUTPATIENT
Start: 2023-04-26

## 2023-04-26 RX ORDER — LISINOPRIL 10 MG/1
10 TABLET ORAL DAILY
Qty: 90 TABLET | Refills: 1 | Status: SHIPPED | OUTPATIENT
Start: 2023-04-26

## 2023-04-26 RX ORDER — EMPAGLIFLOZIN, METFORMIN HYDROCHLORIDE 25; 1000 MG/1; MG/1
1 TABLET, EXTENDED RELEASE ORAL DAILY
Qty: 90 TABLET | Refills: 1 | Status: SHIPPED | OUTPATIENT
Start: 2023-04-26

## 2023-04-26 RX ORDER — QUETIAPINE FUMARATE 150 MG/1
150 TABLET, FILM COATED ORAL
Qty: 90 TABLET | Refills: 1 | Status: SHIPPED | OUTPATIENT
Start: 2023-04-26

## 2023-04-26 NOTE — PROGRESS NOTES
Name: Carrie Bolivar      : 1970      MRN: [id number]  Encounter Provider: GHISLAINE Hubbard  Encounter Date: 2023   Encounter department: [address] [place] [address]  Type 2 diabetes mellitus without complication, without long-term current use of insulin ([facility] )  Assessment & Plan:  - Well controlled  - Continue Synjardy and Prandin    - Encourage healthy diet and regular exercise   - Recommend follow up in 4 months  Lab Results   Component Value Date    HGBA1C 6 1 (H) 2023       Orders:  -     CBC and differential; Future  -     Comprehensive metabolic panel; Future  -     Hemoglobin A1C; Future  -     Lipid Panel with Direct LDL reflex; Future  -     TSH, 3rd generation with Free T4 reflex; Future  -     repaglinide (PRANDIN) 1 mg tablet; Take 1 tablet (1 mg total) by mouth 3 (three) times a day before meals  -     Empagliflozin-metFORMIN HCl ER (Synjardy XR)  MG TB24; Take 1 tablet by mouth daily    2  Other depression  Assessment & Plan:  - Well controlled on Zoloft and Seroquel  Continue same    - Will continue to monitor  Orders:  -     sertraline (ZOLOFT) 50 mg tablet; Take 1 tablet (50 mg total) by mouth daily  -     QUEtiapine Fumarate 150 MG TABS; Take 150 mg by mouth daily at bedtime    3  Hyperlipidemia, unspecified hyperlipidemia type  Comments:  Continue Crestor  Will check lipid panel  Assessment & Plan:  Component      Latest Ref Rng & Units 2022   Cholesterol      See Comment mg/dL 164 162   Triglycerides      See Comment mg/dL 86 91   HDL      >=50 mg/dL 51 55   LDL Calculated      0 - 100 mg/dL 96 89     - Well controlled  - Continue Crestor 10 mg daily   - Encourage healthy diet and regular exercise  - Will continue to monitor fasting lipid panel  Orders:  -     rosuvastatin (CRESTOR) 10 MG tablet; Take 1 tablet (10 mg total) by mouth daily    4   Fiore's esophagus without dysplasia  Assessment & Plan:  - Stable  - Continue omeprazole 40 mg daily  - Will continue to monitor  Orders:  -     omeprazole (PriLOSEC) 40 MG capsule; Take 1 capsule (40 mg total) by mouth daily    5  Essential hypertension  Assessment & Plan:  - Well controlled on lisinopril 10 mg daily  Continue same    - Will continue to monitor  Orders:  -     lisinopril (ZESTRIL) 10 mg tablet; Take 1 tablet (10 mg total) by mouth daily    6  Mild intermittent asthma without complication  Assessment & Plan:  - Well controlled  - Continue albuterol as needed  - Will continue to monitor  Orders:  -     albuterol (PROVENTIL HFA,VENTOLIN HFA) 90 mcg/act inhaler; Inhale 2 puffs every 6 (six) hours as needed for wheezing or shortness of breath    7  Allergic rhinitis, unspecified seasonality, unspecified trigger  -     fluticasone (FLONASE) 50 mcg/act nasal spray; 2 sprays into each nostril daily           Subjective     Patient with PMH of HTN, hyperlipidemia, and type 2 diabetes presents today for follow up  She is taking her prescribed medication and reports no side effects  Her labs are all stable and show well controlled hemoglobin A1c  She denies any significant concerns or complaints today  Review of Systems   Constitutional: Negative for fatigue and fever  HENT: Negative for trouble swallowing  Eyes: Negative for visual disturbance  Respiratory: Negative for cough and shortness of breath  Cardiovascular: Negative for chest pain and palpitations  Gastrointestinal: Negative for abdominal pain and blood in stool  Endocrine: Negative for cold intolerance and heat intolerance  Genitourinary: Negative for difficulty urinating and dysuria  Musculoskeletal: Negative for gait problem  Skin: Negative for rash  Neurological: Negative for dizziness, syncope and headaches  Hematological: Negative for adenopathy  Psychiatric/Behavioral: Negative for behavioral problems         Past Medical History:   Diagnosis Date   • Asthma     exercise induced   • Colon polyp    • COVID-19    • Diabetes mellitus ([facility] [address] )    • Encounter for screening for malignant neoplasm of colon 2019   • GERD (gastroesophageal reflux disease)    • Psychiatric disorder     depression     Past Surgical History:   Procedure Laterality Date   • COLONOSCOPY     • UPPER GASTROINTESTINAL ENDOSCOPY       Family History   Problem Relation Age of Onset   • Hypertension Mother    • Diabetes type II Mother    • No Known Problems Father    • Breast cancer Sister 40   • BRCA1 Negative Sister    • BRCA2 Negative Sister    • Breast cancer Maternal Aunt         age unknown   • Breast cancer Maternal Aunt         age unknown   • Breast cancer Maternal Aunt         age unknown   • No Known Problems Maternal Grandmother    • No Known Problems Maternal Grandfather    • No Known Problems Paternal Grandmother    • No Known Problems Paternal Grandfather    • No Known Problems Son    • No Known Problems Sister    • No Known Problems Sister      Social History     Socioeconomic History   • Marital status: /Civil Union     Spouse name: None   • Number of children: None   • Years of education: None   • Highest education level: None   Occupational History   • None   Tobacco Use   • Smoking status: Former     Types: Cigarettes     Quit date: 1994     Years since quittin 3   • Smokeless tobacco: Never   • Tobacco comments:     No passive smoke exposure   Vaping Use   • Vaping Use: Never used   Substance and Sexual Activity   • Alcohol use: Never   • Drug use: Never   • Sexual activity: None   Other Topics Concern   • None   Social History Narrative   • None     Social Determinants of Health     Financial Resource Strain: Not on file   Food Insecurity: Not on file   Transportation Needs: Not on file   Physical Activity: Not on file   Stress: Not on file   Social Connections: Not on file   Intimate Partner Violence: Not on file   Housing Stability: Not on file Current Outpatient Medications on File Prior to Visit   Medication Sig   • clotrimazole-betamethasone (LOTRISONE) 1-0 05 % cream Apply topically 2 (two) times a day   • cyclobenzaprine (FLEXERIL) 5 mg tablet Take 1 tablet (5 mg total) by mouth 3 (three) times a day as needed for muscle spasms   • ibuprofen (MOTRIN) 600 mg tablet Take 1 tablet (600 mg total) by mouth every 6 (six) hours as needed for mild pain or headaches   • [DISCONTINUED] albuterol (PROVENTIL HFA,VENTOLIN HFA) 90 mcg/act inhaler Inhale 2 puffs every 6 (six) hours as needed for wheezing or shortness of breath   • [DISCONTINUED] Empagliflozin-metFORMIN HCl ER (Synjardy XR)  MG TB24 Take 1 tablet by mouth daily   • [DISCONTINUED] fluticasone (FLONASE) 50 mcg/act nasal spray 2 sprays into each nostril daily   • [DISCONTINUED] lisinopril (ZESTRIL) 10 mg tablet Take 1 tablet (10 mg total) by mouth daily   • [DISCONTINUED] omeprazole (PriLOSEC) 40 MG capsule Take 1 capsule (40 mg total) by mouth daily   • [DISCONTINUED] QUEtiapine Fumarate 150 MG TABS Take 150 mg by mouth daily at bedtime   • [DISCONTINUED] repaglinide (PRANDIN) 1 mg tablet Take 1 tablet (1 mg total) by mouth 3 (three) times a day before meals   • [DISCONTINUED] rosuvastatin (CRESTOR) 10 MG tablet Take 1 tablet (10 mg total) by mouth daily   • [DISCONTINUED] sertraline (ZOLOFT) 50 mg tablet Take 1 tablet (50 mg total) by mouth daily   • norethindrone (MICRONOR) 0 35 MG tablet Take 1 tablet by mouth daily (Patient not taking: Reported on 4/26/2023)     Allergies   Allergen Reactions   • Cephalosporins Hives   • Amoxicillin Hives     Immunization History   Administered Date(s) Administered   • COVID-19 PFIZER VACCINE 0 3 ML IM 01/15/2021, 02/05/2021, 12/16/2021   • INFLUENZA 10/03/2016, 09/30/2018, 11/06/2020   • Influenza, injectable, quadrivalent, preservative free 0 5 mL 09/30/2018, 09/29/2019, 11/06/2020   • Pneumococcal Polysaccharide PPV23 10/18/2016   • Tdap 10/18/2016 "  • Zoster Vaccine Recombinant 02/09/2022, 07/27/2022       Objective     /70 (BP Location: Left arm, Patient Position: Sitting, Cuff Size: Large)   Pulse 90   Temp 98 °F (36 7 °C) (Tympanic)   Resp 16   Ht 5' 4\" (1 626 m)   Wt 104 kg (229 lb)   LMP 02/18/2021   SpO2 94%   BMI 39 31 kg/m²     Physical Exam  Vitals and nursing note reviewed  Constitutional:       Appearance: Normal appearance  HENT:      Head: Normocephalic and atraumatic  Right Ear: External ear normal       Left Ear: External ear normal    Eyes:      Conjunctiva/sclera: Conjunctivae normal    Cardiovascular:      Rate and Rhythm: Normal rate and regular rhythm  Heart sounds: Normal heart sounds  Pulmonary:      Effort: Pulmonary effort is normal       Breath sounds: Normal breath sounds  Musculoskeletal:         General: Normal range of motion  Cervical back: Normal range of motion  Skin:     General: Skin is warm and dry  Neurological:      Mental Status: She is alert and oriented to person, place, and time  Cranial Nerves: No cranial nerve deficit     Psychiatric:         Mood and Affect: Mood normal          Behavior: Behavior normal        [place]  "

## 2023-04-28 ENCOUNTER — TELEPHONE (OUTPATIENT)
Dept: FAMILY MEDICINE CLINIC | Facility: CLINIC | Age: 53
End: 2023-04-28

## 2023-04-28 DIAGNOSIS — F32.89 OTHER DEPRESSION: Primary | ICD-10-CM

## 2023-05-19 ENCOUNTER — TELEPHONE (OUTPATIENT)
Dept: PSYCHIATRY | Facility: CLINIC | Age: 53
End: 2023-05-19

## 2023-05-19 DIAGNOSIS — L23.7 POISON IVY DERMATITIS: Primary | ICD-10-CM

## 2023-05-19 RX ORDER — METHYLPREDNISOLONE 4 MG/1
TABLET ORAL
Qty: 1 EACH | Refills: 1 | Status: SHIPPED | OUTPATIENT
Start: 2023-05-19

## 2023-06-12 ENCOUNTER — TELEPHONE (OUTPATIENT)
Dept: PSYCHIATRY | Facility: CLINIC | Age: 53
End: 2023-06-12

## 2023-06-12 NOTE — TELEPHONE ENCOUNTER
Chelseyr called pt from employee wait list to schedule for med mgmt at [facility]  Writer cortes for pt to return intakes call at [phone]

## 2023-06-21 ENCOUNTER — HOSPITAL ENCOUNTER (OUTPATIENT)
Dept: RADIOLOGY | Age: 53
Discharge: HOME | End: 2023-06-21
Payer: COMMERCIAL

## 2023-06-21 VITALS — WEIGHT: 228 LBS | HEIGHT: 64 IN | BODY MASS INDEX: 38.93 KG/M2

## 2023-06-21 DIAGNOSIS — Z12.31 ENCOUNTER FOR SCREENING MAMMOGRAM FOR MALIGNANT NEOPLASM OF BREAST: ICD-10-CM

## 2023-06-21 PROCEDURE — 77067 SCR MAMMO BI INCL CAD: CPT

## 2023-06-21 PROCEDURE — 77063 BREAST TOMOSYNTHESIS BI: CPT

## 2023-07-05 ENCOUNTER — TELEPHONE (OUTPATIENT)
Dept: FAMILY MEDICINE CLINIC | Facility: CLINIC | Age: 53
End: 2023-07-05

## 2023-07-05 NOTE — TELEPHONE ENCOUNTER
----- Message from Adelaida Tavares MD sent at 7/5/2023  7:05 AM EDT -----  Benign mammogram.  Continue routine screening with mammogram in 1 year.

## 2023-07-17 ENCOUNTER — VBI (OUTPATIENT)
Dept: ADMINISTRATIVE | Facility: OTHER | Age: 53
End: 2023-07-17

## 2023-07-25 DIAGNOSIS — E78.5 HYPERLIPIDEMIA, UNSPECIFIED HYPERLIPIDEMIA TYPE: ICD-10-CM

## 2023-07-25 RX ORDER — ROSUVASTATIN CALCIUM 10 MG/1
10 TABLET, COATED ORAL DAILY
Qty: 90 TABLET | Refills: 0 | Status: SHIPPED | OUTPATIENT
Start: 2023-07-25

## 2023-09-14 DIAGNOSIS — F32.89 OTHER DEPRESSION: ICD-10-CM

## 2023-09-15 DIAGNOSIS — F32.89 OTHER DEPRESSION: ICD-10-CM

## 2023-09-15 RX ORDER — QUETIAPINE FUMARATE 150 MG/1
1 TABLET, FILM COATED ORAL
Qty: 60 TABLET | Refills: 0 | Status: SHIPPED | OUTPATIENT
Start: 2023-09-15

## 2023-09-15 RX ORDER — QUETIAPINE FUMARATE 150 MG/1
1 TABLET, FILM COATED ORAL
Qty: 90 TABLET | Refills: 1 | Status: SHIPPED | OUTPATIENT
Start: 2023-09-15 | End: 2023-09-15

## 2023-10-05 ENCOUNTER — TELEPHONE (OUTPATIENT)
Dept: FAMILY MEDICINE CLINIC | Facility: CLINIC | Age: 53
End: 2023-10-05

## 2023-10-05 DIAGNOSIS — F32.89 OTHER DEPRESSION: ICD-10-CM

## 2023-10-05 RX ORDER — QUETIAPINE FUMARATE 150 MG/1
1 TABLET, FILM COATED ORAL
Qty: 90 TABLET | Refills: 1 | Status: SHIPPED | OUTPATIENT
Start: 2023-10-05 | End: 2023-12-04 | Stop reason: SDUPTHER

## 2023-10-05 NOTE — TELEPHONE ENCOUNTER
Patient is asking for refill of seroquel 150mg. She said the pharmacy did not give her #90 last time    She said we filled this before.  Not on med list

## 2023-10-05 NOTE — TELEPHONE ENCOUNTER
Patient is asking for refill of seroquel 150mg. Last seen 4/26/23. Medication is not on her med list, but she says she got it filled from here before. Please advise.

## 2023-10-06 RX ORDER — QUETIAPINE FUMARATE 150 MG/1
1 TABLET, FILM COATED ORAL
Qty: 90 TABLET | Refills: 1 | OUTPATIENT
Start: 2023-10-06

## 2023-10-23 ENCOUNTER — HOSPITAL ENCOUNTER (EMERGENCY)
Facility: HOSPITAL | Age: 53
Discharge: HOME | End: 2023-10-23
Attending: EMERGENCY MEDICINE

## 2023-10-23 VITALS
SYSTOLIC BLOOD PRESSURE: 111 MMHG | OXYGEN SATURATION: 96 % | TEMPERATURE: 97.7 F | DIASTOLIC BLOOD PRESSURE: 84 MMHG | HEART RATE: 85 BPM | RESPIRATION RATE: 20 BRPM

## 2023-10-23 DIAGNOSIS — M54.50 ACUTE LEFT-SIDED LOW BACK PAIN WITHOUT SCIATICA: Primary | ICD-10-CM

## 2023-10-23 PROCEDURE — 96372 THER/PROPH/DIAG INJ SC/IM: CPT

## 2023-10-23 PROCEDURE — 99283 EMERGENCY DEPT VISIT LOW MDM: CPT

## 2023-10-23 RX ORDER — KETOROLAC TROMETHAMINE 30 MG/ML
15 INJECTION, SOLUTION INTRAMUSCULAR; INTRAVENOUS ONCE
Status: COMPLETED | OUTPATIENT
Start: 2023-10-23 | End: 2023-10-23

## 2023-10-23 RX ORDER — LIDOCAINE 50 MG/G
1 PATCH TOPICAL ONCE
Status: DISCONTINUED | OUTPATIENT
Start: 2023-10-23 | End: 2023-10-23 | Stop reason: HOSPADM

## 2023-10-23 RX ORDER — ACETAMINOPHEN 325 MG/1
650 TABLET ORAL ONCE
Status: COMPLETED | OUTPATIENT
Start: 2023-10-23 | End: 2023-10-23

## 2023-10-23 RX ADMIN — ACETAMINOPHEN 650 MG: 325 TABLET, FILM COATED ORAL at 12:57

## 2023-10-23 RX ADMIN — KETOROLAC TROMETHAMINE 15 MG: 30 INJECTION, SOLUTION INTRAMUSCULAR; INTRAVENOUS at 12:58

## 2023-10-23 RX ADMIN — LIDOCAINE 1 PATCH: 50 PATCH TOPICAL at 12:58

## 2023-10-23 NOTE — ED ATTENDING ATTESTATION
10/23/2023  IShanell DO, saw and evaluated the patient. I have discussed the patient with the resident/non-physician practitioner and agree with the resident's/non-physician practitioner's findings, Plan of Care, and MDM as documented in the resident's/non-physician practitioner's note, except where noted. All available labs and Radiology studies were reviewed. I was present for key portions of any procedure(s) performed by the resident/non-physician practitioner and I was immediately available to provide assistance. At this point I agree with the current assessment done in the Emergency Department. 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 History of similar instances but nothing for a while. Never went to physical therapy no red flags for back pain. Differential includes back sprain strain, doubt cauda equina.   Recommend physical therapy anti-inflammatories muscle relaxers follow-up occumed    ED Course         Critical Care Time  Procedures

## 2023-10-23 NOTE — DISCHARGE INSTRUCTIONS
Please use Motrin and Tylenol as needed for pain. Please follow up with Dennis Ron to be reevaluated. Please return to the emergency department if you develop any new or concerning symptoms including difficulty urinating, weakness, or severe pain.

## 2023-10-23 NOTE — Clinical Note
Raisa Parker was seen and treated in our emergency department on 10/23/2023. Diagnosis:     Luma Bah  may return to work on return date. She may return on this date: 10/25/2023         If you have any questions or concerns, please don't hesitate to call.       Natalia Meyer MD    ______________________________           _______________          _______________  Hospital Representative                              Date                                Time

## 2023-10-24 ENCOUNTER — TELEPHONE (OUTPATIENT)
Dept: PHYSICAL THERAPY | Facility: OTHER | Age: 53
End: 2023-10-24

## 2023-10-24 DIAGNOSIS — F32.89 OTHER DEPRESSION: ICD-10-CM

## 2023-10-24 DIAGNOSIS — E11.9 TYPE 2 DIABETES MELLITUS WITHOUT COMPLICATION, WITHOUT LONG-TERM CURRENT USE OF INSULIN (HCC): ICD-10-CM

## 2023-10-24 RX ORDER — EMPAGLIFLOZIN, METFORMIN HYDROCHLORIDE 25; 1000 MG/1; MG/1
1 TABLET, EXTENDED RELEASE ORAL DAILY
Qty: 30 TABLET | Refills: 0 | Status: SHIPPED | OUTPATIENT
Start: 2023-10-24

## 2023-10-24 NOTE — ED PROVIDER NOTES
History  Chief Complaint   Patient presents with    Back Pain     Pt reports moving an OR table, table began to fall off the stand and pt had to catch the OR pt in order to prevent them from falling on the floor. Pt reports upper back pain now radiating down. Patient is a 58-year-old female who presents for back pain. 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 She is not reporting lower back pain worse on the left side that radiates down to her left buttock. She denies any falls. Denies any urinary incontinence, bowel incontinence, saddle anesthesia, weakness, numbness, or tingling. She states she is head injuries similar to this in the past that is caused her to have flares of her low back pain. She states she sees a chiropractor once a month but usually keeps her symptoms in check. ROS otherwise negative. Prior to Admission Medications   Prescriptions Last Dose Informant Patient Reported? Taking?    Empagliflozin-metFORMIN HCl ER (Synjardy XR)  MG TB24   No No   Sig: Take 1 tablet by mouth daily   QUEtiapine Fumarate 150 MG TABS   No No   Sig: Take 1 tablet by mouth daily at bedtime   albuterol (PROVENTIL HFA,VENTOLIN HFA) 90 mcg/act inhaler   No No   Sig: Inhale 2 puffs every 6 (six) hours as needed for wheezing or shortness of breath   clotrimazole-betamethasone (LOTRISONE) 1-0.05 % cream   No No   Sig: Apply topically 2 (two) times a day   cyclobenzaprine (FLEXERIL) 5 mg tablet   No No   Sig: Take 1 tablet (5 mg total) by mouth 3 (three) times a day as needed for muscle spasms   fluticasone (FLONASE) 50 mcg/act nasal spray   No No   Si sprays into each nostril daily   ibuprofen (MOTRIN) 600 mg tablet   No No   Sig: Take 1 tablet (600 mg total) by mouth every 6 (six) hours as needed for mild pain or headaches   lisinopril (ZESTRIL) 10 mg tablet   No No   Sig: Take 1 tablet (10 mg total) by mouth daily   methylPREDNISolone 4 MG tablet therapy pack   No No   Sig: Use as directed on package   norethindrone (MICRONOR) 0.35 MG tablet  Self Yes No   Sig: Take 1 tablet by mouth daily   Patient not taking: Reported on 4/26/2023   omeprazole (PriLOSEC) 40 MG capsule   No No   Sig: Take 1 capsule (40 mg total) by mouth daily   repaglinide (PRANDIN) 1 mg tablet   No No   Sig: Take 1 tablet (1 mg total) by mouth 3 (three) times a day before meals   rosuvastatin (CRESTOR) 10 MG tablet   No No   Sig: Take 1 tablet (10 mg total) by mouth daily   sertraline (ZOLOFT) 50 mg tablet   No No   Sig: Take 1 tablet (50 mg total) by mouth daily      Facility-Administered Medications: None       Past Medical History:   Diagnosis Date    Asthma     exercise induced    Colon polyp     COVID-19     Diabetes mellitus ([facility])     Encounter for screening for malignant neoplasm of colon 5/7/2019    GERD (gastroesophageal reflux disease)     Psychiatric disorder     depression       Past Surgical History:   Procedure Laterality Date    COLONOSCOPY      UPPER GASTROINTESTINAL ENDOSCOPY         Family History   Problem Relation Age of Onset    Hypertension Mother     Diabetes type II Mother     No Known Problems Father     Breast cancer Sister 40    BRCA1 Negative Sister     BRCA2 Negative Sister     Breast cancer Maternal Aunt         age unknown    Breast cancer Maternal Aunt         age unknown    Breast cancer Maternal Aunt         age unknown    No Known Problems Maternal Grandmother     No Known Problems Maternal Grandfather     No Known Problems Paternal Grandmother     No Known Problems Paternal Grandfather     No Known Problems Son     No Known Problems Sister     No Known Problems Sister      I have reviewed and agree with the history as documented.     E-Cigarette/Vaping    E-Cigarette Use Never User      E-Cigarette/Vaping Substances    Nicotine No     THC No CBD No     Flavoring No     Other No     Unknown No      Social History     Tobacco Use    Smoking status: Former     Types: Cigarettes     Quit date: 1994     Years since quittin.8    Smokeless tobacco: Never    Tobacco comments:     No passive smoke exposure   Vaping Use    Vaping Use: Never used   Substance Use Topics    Alcohol use: Never    Drug use: Never            Physical Exam  ED Triage Vitals [10/23/23 1155]   Temperature Pulse Respirations Blood Pressure SpO2   97.7 °F (36.5 °C) 85 20 111/84 96 %      Temp src Heart Rate Source Patient Position - Orthostatic VS BP Location FiO2 (%)   -- -- -- -- --      Pain Score       5             Orthostatic Vital Signs  Vitals:    10/23/23 1155   BP: 111/84   Pulse: 85       Physical Exam  Vitals and nursing note reviewed. Constitutional:       General: She is not in acute distress. Appearance: Normal appearance. She is well-developed and normal weight. She is not ill-appearing, toxic-appearing or diaphoretic. HENT:      Head: Normocephalic and atraumatic. Right Ear: External ear normal.      Left Ear: External ear normal.      Nose: Nose normal. No congestion or rhinorrhea. Mouth/Throat:      Mouth: Mucous membranes are moist.      Pharynx: Oropharynx is clear. No oropharyngeal exudate or posterior oropharyngeal erythema. Eyes:      General: No scleral icterus. Right eye: No discharge. Left eye: No discharge. Extraocular Movements: Extraocular movements intact. Conjunctiva/sclera: Conjunctivae normal.      Pupils: Pupils are equal, round, and reactive to light. Cardiovascular:      Rate and Rhythm: Normal rate and regular rhythm. Pulses: Normal pulses. Heart sounds: Normal heart sounds. No murmur heard. No friction rub. No gallop. Pulmonary:      Effort: Pulmonary effort is normal. No respiratory distress. Breath sounds: Normal breath sounds. No stridor. No wheezing, rhonchi or rales. Abdominal:      General: Abdomen is flat. Bowel sounds are normal. There is no distension. Palpations: Abdomen is soft. Tenderness: There is no abdominal tenderness. There is no right CVA tenderness, left CVA tenderness or guarding. Musculoskeletal:         General: Tenderness present. Cervical back: Neck supple. Right lower leg: No edema. Left lower leg: No edema. Comments: Tenderness to palpation of the left paraspinal lumbar region. Strength and sensation 5 out of 5 in bilateral lower extremities. Patient is able to ambulate without issue. Skin:     General: Skin is warm and dry. Capillary Refill: Capillary refill takes less than 2 seconds. Coloration: Skin is not jaundiced or pale. Neurological:      General: No focal deficit present. Mental Status: She is alert and oriented to person, place, and time. Cranial Nerves: No cranial nerve deficit. Sensory: No sensory deficit. Motor: No weakness. Psychiatric:         Mood and Affect: Mood normal.         Behavior: Behavior normal.         ED Medications  Medications   ketorolac (TORADOL) injection 15 mg (15 mg Intramuscular Given 10/23/23 1258)   acetaminophen (TYLENOL) tablet 650 mg (650 mg Oral Given 10/23/23 1257)       Diagnostic Studies  Results Reviewed       None                   No orders to display         Procedures  Procedures      ED Course                                       Medical Decision Making  Patient is a 57-year-old female who presents for low back pain. Patient presentation most consistent with acute low back strain. No red flag findings on exam or her history. We will treat symptomatically with Toradol, Tylenol, lidocaine patch. Provided phone number for occupational medicine. Provided [facility]. Return precautions given, all question answered. Risk  OTC drugs. Prescription drug management.           Disposition  Final diagnoses: Acute left-sided low back pain without sciatica     Time reflects when diagnosis was documented in both MDM as applicable and the Disposition within this note       Time User Action Codes Description Comment    10/23/2023 12:48 PM Dafne Bergeron [M54.50] Acute left-sided low back pain without sciatica           ED Disposition       ED Disposition   Discharge    Condition   Stable    Date/Time   Mon Oct 23, 2023 12:48 PM    [facility] discharge to home/self care.                    Follow-up Information       Follow up With Specialties Details Why Contact Info Additional Information    [address] [place] [address]  Schedule an appointment as soon as possible for a visit   [address]     Gracy Wang MD Family Medicine Call  As needed [phone] [facility]  [phone] [address] [place] [address] Emergency Department Emergency Medicine Go to  If symptoms worsen or if you have any other specific concerns [address] [id number]  [facility] Emergency Department, [address], [place], [facility]            Discharge Medication List as of 10/23/2023 12:51 PM        CONTINUE these medications which have NOT CHANGED    Details   QUEtiapine Fumarate 150 MG TABS Take 1 tablet by mouth daily at bedtime, Starting Thu 10/5/2023, Normal      albuterol (PROVENTIL HFA,VENTOLIN HFA) 90 mcg/act inhaler Inhale 2 puffs every 6 (six) hours as needed for wheezing or shortness of breath, Starting Wed 4/26/2023, Normal      clotrimazole-betamethasone (LOTRISONE) 1-0.05 % cream Apply topically 2 (two) times a day, Starting Wed 1/18/2023, Normal      cyclobenzaprine (FLEXERIL) 5 mg tablet Take 1 tablet (5 mg total) by mouth 3 (three) times a day as needed for muscle spasms, Starting Wed 1/18/2023, Normal Empagliflozin-metFORMIN HCl ER (Synjardy XR)  MG TB24 Take 1 tablet by mouth daily, Starting Wed 4/26/2023, Normal      fluticasone (FLONASE) 50 mcg/act nasal spray 2 sprays into each nostril daily, Starting Wed 4/26/2023, Normal      ibuprofen (MOTRIN) 600 mg tablet Take 1 tablet (600 mg total) by mouth every 6 (six) hours as needed for mild pain or headaches, Starting Wed 1/18/2023, Normal      lisinopril (ZESTRIL) 10 mg tablet Take 1 tablet (10 mg total) by mouth daily, Starting Wed 4/26/2023, Normal      methylPREDNISolone 4 MG tablet therapy pack Use as directed on package, Normal      norethindrone (MICRONOR) 0.35 MG tablet Take 1 tablet by mouth daily, Starting Wed 5/1/2019, Historical Med      omeprazole (PriLOSEC) 40 MG capsule Take 1 capsule (40 mg total) by mouth daily, Starting Wed 4/26/2023, Normal      repaglinide (PRANDIN) 1 mg tablet Take 1 tablet (1 mg total) by mouth 3 (three) times a day before meals, Starting Wed 4/26/2023, Normal      rosuvastatin (CRESTOR) 10 MG tablet Take 1 tablet (10 mg total) by mouth daily, Starting Tue 7/25/2023, Normal      sertraline (ZOLOFT) 50 mg tablet Take 1 tablet (50 mg total) by mouth daily, Starting Wed 4/26/2023, Normal               PDMP Review       None             ED Provider  Attending physically available and evaluated Hansel Garcia. I managed the patient along with the ED Attending.     Electronically Signed by           Chayito Jo MD  10/23/23 2023

## 2023-11-16 DIAGNOSIS — F32.89 OTHER DEPRESSION: ICD-10-CM

## 2023-11-16 DIAGNOSIS — I10 ESSENTIAL HYPERTENSION: ICD-10-CM

## 2023-11-17 RX ORDER — LISINOPRIL 10 MG/1
10 TABLET ORAL DAILY
Qty: 30 TABLET | Refills: 0 | Status: SHIPPED | OUTPATIENT
Start: 2023-11-17

## 2023-11-22 LAB
LEFT EYE DIABETIC RETINOPATHY: NORMAL
RIGHT EYE DIABETIC RETINOPATHY: NORMAL

## 2023-11-26 DIAGNOSIS — K22.70 BARRETT'S ESOPHAGUS WITHOUT DYSPLASIA: ICD-10-CM

## 2023-11-26 DIAGNOSIS — E78.5 HYPERLIPIDEMIA, UNSPECIFIED HYPERLIPIDEMIA TYPE: ICD-10-CM

## 2023-11-26 DIAGNOSIS — E11.9 TYPE 2 DIABETES MELLITUS WITHOUT COMPLICATION, WITHOUT LONG-TERM CURRENT USE OF INSULIN (HCC): ICD-10-CM

## 2023-11-27 RX ORDER — EMPAGLIFLOZIN, METFORMIN HYDROCHLORIDE 25; 1000 MG/1; MG/1
1 TABLET, EXTENDED RELEASE ORAL DAILY
Qty: 30 TABLET | Refills: 0 | Status: SHIPPED | OUTPATIENT
Start: 2023-11-27

## 2023-11-27 RX ORDER — OMEPRAZOLE 40 MG/1
40 CAPSULE, DELAYED RELEASE ORAL DAILY
Qty: 90 CAPSULE | Refills: 1 | Status: SHIPPED | OUTPATIENT
Start: 2023-11-27

## 2023-11-27 NOTE — TELEPHONE ENCOUNTER
Pt last seen 4/26/23, has appt 11/26/23. Refill sent to pharmacy. reading glasses/Partially impaired: cannot see medication labels or newsprint, but can see obstacles in path, and the surrounding layout; can count fingers at arm's length

## 2023-11-28 RX ORDER — ROSUVASTATIN CALCIUM 10 MG/1
10 TABLET, COATED ORAL DAILY
Qty: 30 TABLET | Refills: 0 | Status: SHIPPED | OUTPATIENT
Start: 2023-11-28

## 2023-12-04 DIAGNOSIS — F32.89 OTHER DEPRESSION: ICD-10-CM

## 2023-12-04 RX ORDER — QUETIAPINE FUMARATE 150 MG/1
1 TABLET, FILM COATED ORAL
Qty: 30 TABLET | Refills: 0 | Status: SHIPPED | OUTPATIENT
Start: 2023-12-04

## 2023-12-10 DIAGNOSIS — I10 ESSENTIAL HYPERTENSION: ICD-10-CM

## 2023-12-10 DIAGNOSIS — F32.89 OTHER DEPRESSION: ICD-10-CM

## 2023-12-11 RX ORDER — LISINOPRIL 10 MG/1
10 TABLET ORAL DAILY
Qty: 90 TABLET | Refills: 1 | Status: SHIPPED | OUTPATIENT
Start: 2023-12-11

## 2023-12-21 DIAGNOSIS — E78.5 HYPERLIPIDEMIA, UNSPECIFIED HYPERLIPIDEMIA TYPE: ICD-10-CM

## 2023-12-22 RX ORDER — ROSUVASTATIN CALCIUM 10 MG/1
10 TABLET, COATED ORAL DAILY
Qty: 30 TABLET | Refills: 0 | Status: SHIPPED | OUTPATIENT
Start: 2023-12-22

## 2024-01-01 NOTE — PROGRESS NOTES
Assessment/Plan:    Cough  - Not well controlled  - Prescription sent for prednisone 50 mg daily for 5 days  Advised of side effects  Take with food  - Increase oral hydration and use humidifier    - Contact office if cough does not improve  Diagnoses and all orders for this visit:    Cough  -     predniSONE 50 mg tablet; Take 1 tablet (50 mg total) by mouth daily for 5 days        Subjective:      Patient ID: Olivia Canales is a 46 y o  female  Patient presents today with complaints of cough, postnasal drip, and headache  She was seen last week with similar symptoms  She was tested for flu and COVID which were negative  She has been taking azithromycin  Today is her last day of her antibiotic  Cough  This is a recurrent problem  The current episode started in the past 7 days  The problem has been unchanged  The problem occurs constantly  The cough is productive of sputum  Associated symptoms include headaches, postnasal drip, a sore throat and shortness of breath  Pertinent negatives include no ear pain, fever or myalgias  Headache   The current episode started in the past 7 days  The problem occurs daily  The problem has been unchanged  The pain is at a severity of 6/10  The pain is severe  Associated symptoms include coughing, sinus pressure and a sore throat  Pertinent negatives include no ear pain, fever or nausea  Sore Throat   The current episode started in the past 7 days  The problem has been gradually improving  Associated symptoms include congestion, coughing, headaches and shortness of breath  Pertinent negatives include no diarrhea or ear pain  The following portions of the patient's history were reviewed and updated as appropriate: allergies, current medications, past family history, past medical history, past social history, past surgical history and problem list     Review of Systems   Constitutional: Positive for fatigue  Negative for fever     HENT: Positive for Yes congestion, postnasal drip, sinus pressure, sinus pain and sore throat  Negative for ear pain  Respiratory: Positive for cough and shortness of breath  Cardiovascular: Negative  Gastrointestinal: Negative for constipation, diarrhea and nausea  Musculoskeletal: Negative for myalgias  Skin: Negative  Neurological: Positive for headaches  Negative for light-headedness  Objective:      Ht 5' 4" (1 626 m)   Wt 102 kg (225 lb)   BMI 38 62 kg/m²          Physical Exam  Vitals and nursing note reviewed  Constitutional:       General: She is not in acute distress  Appearance: Normal appearance  HENT:      Head: Normocephalic and atraumatic  Right Ear: External ear normal       Left Ear: External ear normal    Eyes:      Conjunctiva/sclera: Conjunctivae normal    Pulmonary:      Effort: Pulmonary effort is normal  No respiratory distress  Musculoskeletal:      Cervical back: Normal range of motion  Neurological:      Mental Status: She is alert and oriented to person, place, and time     Psychiatric:         Mood and Affect: Mood normal          Behavior: Behavior normal

## 2024-01-02 DIAGNOSIS — E11.9 TYPE 2 DIABETES MELLITUS WITHOUT COMPLICATION, WITHOUT LONG-TERM CURRENT USE OF INSULIN (HCC): ICD-10-CM

## 2024-01-02 RX ORDER — EMPAGLIFLOZIN, METFORMIN HYDROCHLORIDE 25; 1000 MG/1; MG/1
1 TABLET, EXTENDED RELEASE ORAL DAILY
Qty: 30 TABLET | Refills: 0 | Status: SHIPPED | OUTPATIENT
Start: 2024-01-02

## 2024-01-13 DIAGNOSIS — E78.5 HYPERLIPIDEMIA, UNSPECIFIED HYPERLIPIDEMIA TYPE: ICD-10-CM

## 2024-01-15 RX ORDER — ROSUVASTATIN CALCIUM 10 MG/1
10 TABLET, COATED ORAL DAILY
Qty: 90 TABLET | Refills: 1 | Status: SHIPPED | OUTPATIENT
Start: 2024-01-15

## 2024-01-18 ENCOUNTER — VBI (OUTPATIENT)
Dept: ADMINISTRATIVE | Facility: OTHER | Age: 54
End: 2024-01-18

## 2024-01-18 NOTE — TELEPHONE ENCOUNTER
Upon review of the In Basket request we were able to locate, review, and update the patient chart as requested for Diabetic Eye Exam.    Any additional questions or concerns should be emailed to the Practice Liaisons via the appropriate education email address, please do not reply via In Basket.    Thank you  Brad Lafleur MA

## 2024-01-31 DIAGNOSIS — E11.9 TYPE 2 DIABETES MELLITUS WITHOUT COMPLICATION, WITHOUT LONG-TERM CURRENT USE OF INSULIN (HCC): ICD-10-CM

## 2024-01-31 DIAGNOSIS — F32.89 OTHER DEPRESSION: ICD-10-CM

## 2024-02-01 NOTE — TELEPHONE ENCOUNTER
LVM to schedule Med Check appt before April to ensure we can refill again after this presription refill.

## 2024-02-02 RX ORDER — EMPAGLIFLOZIN, METFORMIN HYDROCHLORIDE 25; 1000 MG/1; MG/1
1 TABLET, EXTENDED RELEASE ORAL DAILY
Qty: 30 TABLET | Refills: 0 | Status: SHIPPED | OUTPATIENT
Start: 2024-02-02

## 2024-02-02 RX ORDER — QUETIAPINE FUMARATE 150 MG/1
1 TABLET, FILM COATED ORAL
Qty: 30 TABLET | Refills: 0 | Status: SHIPPED | OUTPATIENT
Start: 2024-02-02

## 2024-02-14 ENCOUNTER — APPOINTMENT (OUTPATIENT)
Dept: LAB | Facility: IMAGING CENTER | Age: 54
End: 2024-02-14
Payer: COMMERCIAL

## 2024-02-14 ENCOUNTER — OFFICE VISIT (OUTPATIENT)
Dept: FAMILY MEDICINE CLINIC | Facility: CLINIC | Age: 54
End: 2024-02-14
Payer: COMMERCIAL

## 2024-02-14 VITALS
SYSTOLIC BLOOD PRESSURE: 110 MMHG | RESPIRATION RATE: 16 BRPM | BODY MASS INDEX: 40.12 KG/M2 | WEIGHT: 235 LBS | HEIGHT: 64 IN | OXYGEN SATURATION: 96 % | DIASTOLIC BLOOD PRESSURE: 64 MMHG | HEART RATE: 82 BPM | TEMPERATURE: 97.4 F

## 2024-02-14 DIAGNOSIS — Z00.00 HEALTHCARE MAINTENANCE: ICD-10-CM

## 2024-02-14 DIAGNOSIS — Z12.31 ENCOUNTER FOR SCREENING MAMMOGRAM FOR MALIGNANT NEOPLASM OF BREAST: ICD-10-CM

## 2024-02-14 DIAGNOSIS — E78.5 HYPERLIPIDEMIA, UNSPECIFIED HYPERLIPIDEMIA TYPE: ICD-10-CM

## 2024-02-14 DIAGNOSIS — I10 ESSENTIAL HYPERTENSION: ICD-10-CM

## 2024-02-14 DIAGNOSIS — E11.9 TYPE 2 DIABETES MELLITUS WITHOUT COMPLICATION, WITHOUT LONG-TERM CURRENT USE OF INSULIN (HCC): ICD-10-CM

## 2024-02-14 DIAGNOSIS — E11.9 TYPE 2 DIABETES MELLITUS WITHOUT COMPLICATION, WITHOUT LONG-TERM CURRENT USE OF INSULIN (HCC): Primary | ICD-10-CM

## 2024-02-14 DIAGNOSIS — K21.9 GASTROESOPHAGEAL REFLUX DISEASE WITHOUT ESOPHAGITIS: ICD-10-CM

## 2024-02-14 DIAGNOSIS — F32.89 OTHER DEPRESSION: ICD-10-CM

## 2024-02-14 LAB
ALBUMIN SERPL BCP-MCNC: 4.4 G/DL (ref 3.5–5)
ALP SERPL-CCNC: 72 U/L (ref 34–104)
ALT SERPL W P-5'-P-CCNC: 26 U/L (ref 7–52)
ANION GAP SERPL CALCULATED.3IONS-SCNC: 10 MMOL/L
AST SERPL W P-5'-P-CCNC: 18 U/L (ref 13–39)
BASOPHILS # BLD AUTO: 0.03 THOUSANDS/ÂΜL (ref 0–0.1)
BASOPHILS NFR BLD AUTO: 1 % (ref 0–1)
BILIRUB SERPL-MCNC: 0.65 MG/DL (ref 0.2–1)
BUN SERPL-MCNC: 13 MG/DL (ref 5–25)
CALCIUM SERPL-MCNC: 9.2 MG/DL (ref 8.4–10.2)
CHLORIDE SERPL-SCNC: 101 MMOL/L (ref 96–108)
CHOLEST SERPL-MCNC: 188 MG/DL
CO2 SERPL-SCNC: 30 MMOL/L (ref 21–32)
CREAT SERPL-MCNC: 0.55 MG/DL (ref 0.6–1.3)
CREAT UR-MCNC: 35.2 MG/DL
EOSINOPHIL # BLD AUTO: 0.1 THOUSAND/ÂΜL (ref 0–0.61)
EOSINOPHIL NFR BLD AUTO: 2 % (ref 0–6)
ERYTHROCYTE [DISTWIDTH] IN BLOOD BY AUTOMATED COUNT: 12.6 % (ref 11.6–15.1)
GFR SERPL CREATININE-BSD FRML MDRD: 105 ML/MIN/1.73SQ M
GLUCOSE P FAST SERPL-MCNC: 129 MG/DL (ref 65–99)
HCT VFR BLD AUTO: 43.6 % (ref 34.8–46.1)
HDLC SERPL-MCNC: 58 MG/DL
HGB BLD-MCNC: 14.4 G/DL (ref 11.5–15.4)
IMM GRANULOCYTES # BLD AUTO: 0.02 THOUSAND/UL (ref 0–0.2)
IMM GRANULOCYTES NFR BLD AUTO: 0 % (ref 0–2)
LDLC SERPL CALC-MCNC: 106 MG/DL (ref 0–100)
LYMPHOCYTES # BLD AUTO: 2.38 THOUSANDS/ÂΜL (ref 0.6–4.47)
LYMPHOCYTES NFR BLD AUTO: 37 % (ref 14–44)
MCH RBC QN AUTO: 30.3 PG (ref 26.8–34.3)
MCHC RBC AUTO-ENTMCNC: 33 G/DL (ref 31.4–37.4)
MCV RBC AUTO: 92 FL (ref 82–98)
MICROALBUMIN UR-MCNC: 34.6 MG/L
MICROALBUMIN/CREAT 24H UR: 98 MG/G CREATININE (ref 0–30)
MONOCYTES # BLD AUTO: 0.31 THOUSAND/ÂΜL (ref 0.17–1.22)
MONOCYTES NFR BLD AUTO: 5 % (ref 4–12)
NEUTROPHILS # BLD AUTO: 3.68 THOUSANDS/ÂΜL (ref 1.85–7.62)
NEUTS SEG NFR BLD AUTO: 55 % (ref 43–75)
NRBC BLD AUTO-RTO: 0 /100 WBCS
PLATELET # BLD AUTO: 219 THOUSANDS/UL (ref 149–390)
PMV BLD AUTO: 13 FL (ref 8.9–12.7)
POTASSIUM SERPL-SCNC: 4.2 MMOL/L (ref 3.5–5.3)
PROT SERPL-MCNC: 7 G/DL (ref 6.4–8.4)
RBC # BLD AUTO: 4.76 MILLION/UL (ref 3.81–5.12)
SL AMB POCT HEMOGLOBIN AIC: 6.3 (ref ?–6.5)
SODIUM SERPL-SCNC: 141 MMOL/L (ref 135–147)
TRIGL SERPL-MCNC: 120 MG/DL
TSH SERPL DL<=0.05 MIU/L-ACNC: 1.39 UIU/ML (ref 0.45–4.5)
WBC # BLD AUTO: 6.52 THOUSAND/UL (ref 4.31–10.16)

## 2024-02-14 PROCEDURE — 36415 COLL VENOUS BLD VENIPUNCTURE: CPT

## 2024-02-14 PROCEDURE — 84443 ASSAY THYROID STIM HORMONE: CPT

## 2024-02-14 PROCEDURE — 82043 UR ALBUMIN QUANTITATIVE: CPT | Performed by: NURSE PRACTITIONER

## 2024-02-14 PROCEDURE — 83036 HEMOGLOBIN GLYCOSYLATED A1C: CPT | Performed by: NURSE PRACTITIONER

## 2024-02-14 PROCEDURE — 85025 COMPLETE CBC W/AUTO DIFF WBC: CPT

## 2024-02-14 PROCEDURE — 80053 COMPREHEN METABOLIC PANEL: CPT

## 2024-02-14 PROCEDURE — 99396 PREV VISIT EST AGE 40-64: CPT | Performed by: NURSE PRACTITIONER

## 2024-02-14 PROCEDURE — 99214 OFFICE O/P EST MOD 30 MIN: CPT | Performed by: NURSE PRACTITIONER

## 2024-02-14 PROCEDURE — 83036 HEMOGLOBIN GLYCOSYLATED A1C: CPT

## 2024-02-14 PROCEDURE — 80061 LIPID PANEL: CPT

## 2024-02-14 PROCEDURE — 82570 ASSAY OF URINE CREATININE: CPT | Performed by: NURSE PRACTITIONER

## 2024-02-14 RX ORDER — QUETIAPINE FUMARATE 150 MG/1
1 TABLET, FILM COATED ORAL
Qty: 90 TABLET | Refills: 1 | Status: SHIPPED | OUTPATIENT
Start: 2024-02-14

## 2024-02-14 RX ORDER — EMPAGLIFLOZIN, METFORMIN HYDROCHLORIDE 25; 1000 MG/1; MG/1
1 TABLET, EXTENDED RELEASE ORAL DAILY
Qty: 90 TABLET | Refills: 1 | Status: SHIPPED | OUTPATIENT
Start: 2024-02-14

## 2024-02-14 NOTE — ASSESSMENT & PLAN NOTE
- Reviewed immunizations and screenings.   - UTD with dental, vision, and GYN exams.   - Mammogram and routine labs ordered.  - UTD with colonoscopy.

## 2024-02-14 NOTE — PROGRESS NOTES
Name: Ewa Nolan      : 1970      MRN: [id number]  Encounter Provider: GHISLAINE Dukes  Encounter Date: 2024   Encounter department: [facility]    Assessment & Plan     1. Type 2 diabetes mellitus without complication, without long-term current use of insulin (HCC)  Assessment & Plan:  - Well controlled.  - Continue current medication regimen.   - Encourage low carb diet and regular exercise.  - Will continue to monitor.  Lab Results   Component Value Date    HGBA1C 6.3 2024       Orders:  -     POCT hemoglobin A1c  -     Albumin / creatinine urine ratio; Future  -     Albumin / creatinine urine ratio  -     Hemoglobin A1C; Future; Expected date: 2024  -     Comprehensive metabolic panel; Future; Expected date: 2024  -     Lipid Panel with Direct LDL reflex; Future; Expected date: 2024  -     Empagliflozin-metFORMIN HCl ER (Synjardy XR)  MG TB24; Take 1 tablet by mouth daily    2. Hyperlipidemia, unspecified hyperlipidemia type  Assessment & Plan:  Component      Latest Ref Rng & Units 2022   Cholesterol      See Comment mg/dL 164 162   Triglycerides      See Comment mg/dL 86 91   HDL      >=50 mg/dL 51 55   LDL Calculated      0 - 100 mg/dL 96 89      - Well controlled.  - Continue Crestor 10 mg daily.  - Encourage healthy diet and regular exercise.  - Will continue to monitor fasting lipid panel.     Orders:  -     Lipid Panel with Direct LDL reflex; Future; Expected date: 2024    3. Essential hypertension  Assessment & Plan:  - Well controlled on lisinopril 10 mg daily. Continue same.   - Will continue to monitor.    Orders:  -     Comprehensive metabolic panel; Future; Expected date: 2024  -     CBC and differential; Future; Expected date: 2024  -     Lipid Panel with Direct LDL reflex; Future; Expected date: 2024  -     TSH, 3rd generation with Free T4 reflex; Future; Expected date:  02/14/2024    4. Other depression  Assessment & Plan:  - Well controlled on Zoloft and Seroquel. Continue same.   - Will continue to monitor.    Orders:  -     QUEtiapine Fumarate 150 MG TABS; Take 1 tablet by mouth daily at bedtime    5. Gastroesophageal reflux disease without esophagitis  Assessment & Plan:  - Stable on omeprazole 40 mg daily. Continue same.   - Avoid triggering food and beverage.  - Will continue to monitor.       6. Healthcare maintenance  Assessment & Plan:  - Reviewed immunizations and screenings.   - UTD with dental, vision, and GYN exams.   - Mammogram and routine labs ordered.  - UTD with colonoscopy.     Orders:  -     Comprehensive metabolic panel; Future; Expected date: 02/14/2024  -     CBC and differential; Future; Expected date: 02/14/2024  -     Lipid Panel with Direct LDL reflex; Future; Expected date: 02/14/2024  -     TSH, 3rd generation with Free T4 reflex; Future; Expected date: 02/14/2024    7. Encounter for screening mammogram for malignant neoplasm of breast  -     Mammo screening bilateral w 3d & cad; Future           Subjective     Patient with PMH of GERD, HTN, HLD, type 2 diabetes, and depression presents to office today for follow up. She is taking her prescribed medications and reports no side effects. She had her blood work done this morning - results still pending. Hemoglobin A1c in the office today is well controlled at 6.3. She denies any concerns or complaints.           Review of Systems   Constitutional:  Negative for fatigue and fever.   HENT:  Negative for congestion, postnasal drip and trouble swallowing.    Eyes:  Negative for visual disturbance.   Respiratory:  Negative for cough and shortness of breath.    Cardiovascular:  Negative for chest pain and palpitations.   Gastrointestinal:  Negative for abdominal pain and blood in stool.   Endocrine: Negative for cold intolerance and heat intolerance.   Genitourinary:  Negative for difficulty urinating, dysuria  and frequency.   Musculoskeletal:  Negative for gait problem.   Skin:  Negative for rash.   Neurological:  Negative for dizziness, syncope and headaches.   Hematological:  Negative for adenopathy.   Psychiatric/Behavioral:  Negative for behavioral problems.        Past Medical History:   Diagnosis Date    Asthma     exercise induced    Colon polyp     COVID-19     Diabetes mellitus (HCC)     Encounter for screening for malignant neoplasm of colon 2019    GERD (gastroesophageal reflux disease)     Psychiatric disorder     depression     Past Surgical History:   Procedure Laterality Date    COLONOSCOPY      UPPER GASTROINTESTINAL ENDOSCOPY       Family History   Problem Relation Age of Onset    Hypertension Mother     Diabetes type II Mother     No Known Problems Father     Breast cancer Sister 44    BRCA1 Negative Sister     BRCA2 Negative Sister     Breast cancer Maternal Aunt         age unknown    Breast cancer Maternal Aunt         age unknown    Breast cancer Maternal Aunt         age unknown    No Known Problems Maternal Grandmother     No Known Problems Maternal Grandfather     No Known Problems Paternal Grandmother     No Known Problems Paternal Grandfather     No Known Problems Son     No Known Problems Sister     No Known Problems Sister      Social History     Socioeconomic History    Marital status: /Civil Union     Spouse name: None    Number of children: None    Years of education: None    Highest education level: None   Occupational History    None   Tobacco Use    Smoking status: Former     Current packs/day: 0.00     Types: Cigarettes     Quit date: 1994     Years since quittin.1    Smokeless tobacco: Never    Tobacco comments:     No passive smoke exposure   Vaping Use    Vaping status: Never Used   Substance and Sexual Activity    Alcohol use: Never    Drug use: Never    Sexual activity: None   Other Topics Concern    None   Social History Narrative    None     Social  Determinants of Health     Financial Resource Strain: Not on file   Food Insecurity: Not on file   Transportation Needs: Not on file   Physical Activity: Not on file   Stress: Not on file   Social Connections: Not on file   Intimate Partner Violence: Not on file   Housing Stability: Not on file     Current Outpatient Medications on File Prior to Visit   Medication Sig    albuterol (PROVENTIL HFA,VENTOLIN HFA) 90 mcg/act inhaler Inhale 2 puffs every 6 (six) hours as needed for wheezing or shortness of breath    fluticasone (FLONASE) 50 mcg/act nasal spray 2 sprays into each nostril daily    lisinopril (ZESTRIL) 10 mg tablet TAKE 1 TABLET BY MOUTH EVERY DAY    omeprazole (PriLOSEC) 40 MG capsule TAKE 1 CAPSULE (40 MG TOTAL) BY MOUTH DAILY.    repaglinide (PRANDIN) 1 mg tablet Take 1 tablet (1 mg total) by mouth 3 (three) times a day before meals    rosuvastatin (CRESTOR) 10 MG tablet TAKE 1 TABLET BY MOUTH EVERY DAY    sertraline (ZOLOFT) 50 mg tablet Take 1 tablet (50 mg total) by mouth daily    [DISCONTINUED] Empagliflozin-metFORMIN HCl ER (Synjardy XR)  MG TB24 Take 1 tablet by mouth daily    [DISCONTINUED] QUEtiapine Fumarate 150 MG TABS Take 1 tablet by mouth daily at bedtime    clotrimazole-betamethasone (LOTRISONE) 1-0.05 % cream Apply topically 2 (two) times a day    cyclobenzaprine (FLEXERIL) 5 mg tablet Take 1 tablet (5 mg total) by mouth 3 (three) times a day as needed for muscle spasms    ibuprofen (MOTRIN) 600 mg tablet Take 1 tablet (600 mg total) by mouth every 6 (six) hours as needed for mild pain or headaches    methylPREDNISolone 4 MG tablet therapy pack Use as directed on package    norethindrone (MICRONOR) 0.35 MG tablet Take 1 tablet by mouth daily (Patient not taking: Reported on 4/26/2023)     Allergies   Allergen Reactions    Cephalosporins Hives    Amoxicillin Hives     Immunization History   Administered Date(s) Administered    COVID-19 Moderna mRNA Vaccine 12 Yr+ 50 mcg/0.5 mL  "(Spikevax) 10/07/2023    COVID-19 PFIZER VACCINE 0.3 ML IM 01/15/2021, 02/05/2021, 12/16/2021    INFLUENZA 10/03/2016, 09/30/2018, 11/06/2020    Influenza, injectable, quadrivalent, preservative free 0.5 mL 09/30/2018, 09/29/2019, 11/06/2020    Pneumococcal Polysaccharide PPV23 10/18/2016    Tdap 10/18/2016    Zoster Vaccine Recombinant 02/09/2022, 07/27/2022       Objective     /64 (BP Location: Left arm, Patient Position: Sitting, Cuff Size: Adult)   Pulse 82   Temp (!) 97.4 °F (36.3 °C) (Tympanic)   Resp 16   Ht 5' 4\" (1.626 m)   Wt 107 kg (235 lb)   LMP 02/18/2021   SpO2 96%   BMI 40.34 kg/m²     Physical Exam  Vitals and nursing note reviewed.   Constitutional:       General: She is not in acute distress.     Appearance: Normal appearance. She is not ill-appearing.   HENT:      Head: Normocephalic and atraumatic.      Right Ear: Tympanic membrane, ear canal and external ear normal.      Left Ear: Tympanic membrane, ear canal and external ear normal.      Nose: Nose normal.      Mouth/Throat:      Mouth: Mucous membranes are moist.   Eyes:      Conjunctiva/sclera: Conjunctivae normal.      Pupils: Pupils are equal, round, and reactive to light.   Cardiovascular:      Rate and Rhythm: Normal rate and regular rhythm.      Heart sounds: Normal heart sounds.   Pulmonary:      Effort: Pulmonary effort is normal.      Breath sounds: Normal breath sounds.   Abdominal:      General: Bowel sounds are normal.      Palpations: Abdomen is soft.   Musculoskeletal:         General: Normal range of motion.      Cervical back: Normal range of motion.   Lymphadenopathy:      Cervical: No cervical adenopathy.   Skin:     General: Skin is warm and dry.   Neurological:      Mental Status: She is alert and oriented to person, place, and time.   Psychiatric:         Mood and Affect: Mood normal.         Behavior: Behavior normal.       GHISLAINE Dukes    "

## 2024-02-14 NOTE — ASSESSMENT & PLAN NOTE
Component      Latest Ref Rng & Units 7/27/2022 1/18/2023   Cholesterol      See Comment mg/dL 164 162   Triglycerides      See Comment mg/dL 86 91   HDL      >=50 mg/dL 51 55   LDL Calculated      0 - 100 mg/dL 96 89      - Well controlled.  - Continue Crestor 10 mg daily.  - Encourage healthy diet and regular exercise.  - Will continue to monitor fasting lipid panel.

## 2024-02-14 NOTE — ASSESSMENT & PLAN NOTE
- Stable on omeprazole 40 mg daily. Continue same.   - Avoid triggering food and beverage.  - Will continue to monitor.

## 2024-02-15 LAB
EST. AVERAGE GLUCOSE BLD GHB EST-MCNC: 148 MG/DL
HBA1C MFR BLD: 6.8 %

## 2024-02-19 ENCOUNTER — TELEPHONE (OUTPATIENT)
Dept: FAMILY MEDICINE CLINIC | Facility: CLINIC | Age: 54
End: 2024-02-19

## 2024-02-19 NOTE — TELEPHONE ENCOUNTER
----- Message from GHISLAINE Dukes sent at 2/16/2024  2:14 PM EST -----  A1c is well controlled. Rest of labs are stable.

## 2024-02-21 PROBLEM — J06.9 ACUTE UPPER RESPIRATORY INFECTION: Status: RESOLVED | Noted: 2020-10-13 | Resolved: 2024-02-21

## 2024-02-21 PROBLEM — J06.9 ACUTE RESPIRATORY DISEASE DUE TO SEVERE ACUTE RESPIRATORY SYNDROME CORONAVIRUS 2 (SARS-COV-2): Status: RESOLVED | Noted: 2020-10-16 | Resolved: 2024-02-21

## 2024-02-21 PROBLEM — Z00.00 HEALTHCARE MAINTENANCE: Status: RESOLVED | Noted: 2020-06-25 | Resolved: 2024-02-21

## 2024-02-21 PROBLEM — U07.1 ACUTE RESPIRATORY DISEASE DUE TO SEVERE ACUTE RESPIRATORY SYNDROME CORONAVIRUS 2 (SARS-COV-2): Status: RESOLVED | Noted: 2020-10-16 | Resolved: 2024-02-21

## 2024-03-08 DIAGNOSIS — I10 ESSENTIAL HYPERTENSION: ICD-10-CM

## 2024-03-08 RX ORDER — LISINOPRIL 10 MG/1
10 TABLET ORAL DAILY
Qty: 90 TABLET | Refills: 1 | Status: SHIPPED | OUTPATIENT
Start: 2024-03-08

## 2024-03-29 ENCOUNTER — TELEPHONE (OUTPATIENT)
Dept: ADMINISTRATIVE | Facility: OTHER | Age: 54
End: 2024-03-29

## 2024-03-29 NOTE — TELEPHONE ENCOUNTER
----- Message from Sailaja Headley RN sent at 3/29/2024  8:42 AM EDT -----  Regarding: DM foot exam  03/29/24 8:42 AM    Hello, our patient Ewa Nolan has had Diabetic Foot Exam completed/performed. Please assist in updating the patient chart by making an External outreach to [facility] foot Doctor facility located [facility]. Phone number [phone].    Thank you,  Sailaja Headley, [facility]

## 2024-03-29 NOTE — TELEPHONE ENCOUNTER
Upon review of the In Basket request and the patient's chart, initial outreach has been made via telephone call to facility. Please see Contacts section for details.     X1 foot     Thank you  Patrica Nowak

## 2024-03-29 NOTE — TELEPHONE ENCOUNTER
Upon review of the In Basket request we have noted that incoming fax from outreach, same date as in HM.  2020.     because of this we are requesting that you forward this request/concern to the appropriate education email address. The Quality team members assigned to this email will be more than happy to assist you.    Any additional questions or concerns should be emailed to the Practice Liaisons via the appropriate education email address, please do not reply via In Basket.    Thank you  Patrica Nowak

## 2024-04-30 DIAGNOSIS — F32.89 OTHER DEPRESSION: ICD-10-CM

## 2024-05-24 DIAGNOSIS — F32.89 OTHER DEPRESSION: ICD-10-CM

## 2024-06-03 DIAGNOSIS — F32.89 OTHER DEPRESSION: ICD-10-CM

## 2024-06-03 RX ORDER — QUETIAPINE FUMARATE 150 MG/1
1 TABLET, FILM COATED ORAL
Qty: 30 TABLET | Refills: 3 | Status: SHIPPED | OUTPATIENT
Start: 2024-06-03

## 2024-06-03 RX ORDER — QUETIAPINE FUMARATE 150 MG/1
1 TABLET, FILM COATED ORAL
Qty: 90 TABLET | Refills: 1 | Status: CANCELLED | OUTPATIENT
Start: 2024-06-03

## 2024-06-03 NOTE — TELEPHONE ENCOUNTER
Pt has been getting only 30 days at time which is what her insurance covers and needs this asap. Refill sent to provider for approval

## 2024-06-03 NOTE — TELEPHONE ENCOUNTER
Medication: Quetiapine Fumarate     Dose/Frequency: 150 mg     Quantity: 90/ 1 refill     Pharmacy:   [facility]/pharm[facility] #[id number] - [place] [id number]  Phone: [phone]  Fax: [phone]       Office:   [x] PCP/Provider -   [] Speciality/Provider -     Does the patient have enough for 3 days?   [] Yes   [x] No - Send as HP to POD       She said she needs them tonight.

## 2024-07-23 NOTE — ASSESSMENT & PLAN NOTE
- Stable  - Continue omeprazole 40 mg daily  - Will continue to monitor 
- Well controlled  - Continue Synjardy and Prandin    - Encourage healthy diet and regular exercise   - Recommend follow up in 4 months     Lab Results   Component Value Date    HGBA1C 6 1 (H) 01/18/2023
- Well controlled  - Continue albuterol as needed  - Will continue to monitor 
- Well controlled on Zoloft and Seroquel  Continue same    - Will continue to monitor 
- Well controlled on lisinopril 10 mg daily  Continue same    - Will continue to monitor 
Component      Latest Ref Rng & Units 7/27/2022 1/18/2023   Cholesterol      See Comment mg/dL 164 162   Triglycerides      See Comment mg/dL 86 91   HDL      >=50 mg/dL 51 55   LDL Calculated      0 - 100 mg/dL 96 89     - Well controlled  - Continue Crestor 10 mg daily   - Encourage healthy diet and regular exercise  - Will continue to monitor fasting lipid panel 
[Negative] : Genitourinary

## 2024-08-14 ENCOUNTER — HOSPITAL ENCOUNTER (OUTPATIENT)
Dept: RADIOLOGY | Age: 54
Discharge: HOME | End: 2024-08-14
Payer: COMMERCIAL

## 2024-08-14 VITALS — BODY MASS INDEX: 40.12 KG/M2 | HEIGHT: 64 IN | WEIGHT: 235 LBS

## 2024-08-14 DIAGNOSIS — Z12.31 ENCOUNTER FOR SCREENING MAMMOGRAM FOR MALIGNANT NEOPLASM OF BREAST: ICD-10-CM

## 2024-08-14 PROCEDURE — 77063 BREAST TOMOSYNTHESIS BI: CPT

## 2024-08-14 PROCEDURE — 77067 SCR MAMMO BI INCL CAD: CPT

## 2024-08-24 DIAGNOSIS — I10 ESSENTIAL HYPERTENSION: ICD-10-CM

## 2024-08-24 DIAGNOSIS — E78.5 HYPERLIPIDEMIA, UNSPECIFIED HYPERLIPIDEMIA TYPE: ICD-10-CM

## 2024-08-25 RX ORDER — ROSUVASTATIN CALCIUM 10 MG/1
10 TABLET, COATED ORAL DAILY
Qty: 30 TABLET | Refills: 5 | Status: SHIPPED | OUTPATIENT
Start: 2024-08-25

## 2024-08-25 RX ORDER — LISINOPRIL 10 MG/1
10 TABLET ORAL DAILY
Qty: 30 TABLET | Refills: 5 | Status: SHIPPED | OUTPATIENT
Start: 2024-08-25

## 2024-08-28 DIAGNOSIS — E11.9 TYPE 2 DIABETES MELLITUS WITHOUT COMPLICATION, WITHOUT LONG-TERM CURRENT USE OF INSULIN (HCC): ICD-10-CM

## 2024-08-28 RX ORDER — EMPAGLIFLOZIN, METFORMIN HYDROCHLORIDE 25; 1000 MG/1; MG/1
1 TABLET, EXTENDED RELEASE ORAL DAILY
Qty: 30 TABLET | Refills: 0 | Status: SHIPPED | OUTPATIENT
Start: 2024-08-28

## 2024-09-16 DIAGNOSIS — F32.89 OTHER DEPRESSION: ICD-10-CM

## 2024-09-17 DIAGNOSIS — E78.5 HYPERLIPIDEMIA, UNSPECIFIED HYPERLIPIDEMIA TYPE: ICD-10-CM

## 2024-09-17 DIAGNOSIS — I10 ESSENTIAL HYPERTENSION: ICD-10-CM

## 2024-09-17 RX ORDER — QUETIAPINE FUMARATE 150 MG/1
1 TABLET, FILM COATED ORAL
Qty: 30 TABLET | Refills: 0 | Status: SHIPPED | OUTPATIENT
Start: 2024-09-17 | End: 2024-09-26 | Stop reason: SDUPTHER

## 2024-09-17 NOTE — TELEPHONE ENCOUNTER
Pt last seen 2/14/24 and I sent message to pt that a visit is past due and she is to call for appointment. Refill sent for provider approval for 1 month with no refills.

## 2024-09-18 RX ORDER — ROSUVASTATIN CALCIUM 10 MG/1
10 TABLET, COATED ORAL DAILY
Qty: 90 TABLET | Refills: 1 | Status: SHIPPED | OUTPATIENT
Start: 2024-09-18

## 2024-09-18 RX ORDER — LISINOPRIL 10 MG/1
10 TABLET ORAL DAILY
Qty: 90 TABLET | Refills: 1 | Status: SHIPPED | OUTPATIENT
Start: 2024-09-18

## 2024-09-26 ENCOUNTER — OFFICE VISIT (OUTPATIENT)
Dept: FAMILY MEDICINE CLINIC | Facility: CLINIC | Age: 54
End: 2024-09-26
Payer: COMMERCIAL

## 2024-09-26 ENCOUNTER — APPOINTMENT (OUTPATIENT)
Dept: LAB | Age: 54
End: 2024-09-26
Payer: COMMERCIAL

## 2024-09-26 VITALS
BODY MASS INDEX: 40.19 KG/M2 | HEIGHT: 64 IN | SYSTOLIC BLOOD PRESSURE: 124 MMHG | TEMPERATURE: 97.5 F | RESPIRATION RATE: 16 BRPM | HEART RATE: 93 BPM | OXYGEN SATURATION: 96 % | WEIGHT: 235.4 LBS | DIASTOLIC BLOOD PRESSURE: 78 MMHG

## 2024-09-26 DIAGNOSIS — E11.9 TYPE 2 DIABETES MELLITUS WITHOUT COMPLICATION, WITHOUT LONG-TERM CURRENT USE OF INSULIN (HCC): ICD-10-CM

## 2024-09-26 DIAGNOSIS — Z23 IMMUNIZATION DUE: ICD-10-CM

## 2024-09-26 DIAGNOSIS — Z00.00 HEALTHCARE MAINTENANCE: ICD-10-CM

## 2024-09-26 DIAGNOSIS — K21.9 GASTROESOPHAGEAL REFLUX DISEASE WITHOUT ESOPHAGITIS: ICD-10-CM

## 2024-09-26 DIAGNOSIS — F32.89 OTHER DEPRESSION: ICD-10-CM

## 2024-09-26 DIAGNOSIS — J45.20 MILD INTERMITTENT ASTHMA WITHOUT COMPLICATION: ICD-10-CM

## 2024-09-26 DIAGNOSIS — I10 ESSENTIAL HYPERTENSION: ICD-10-CM

## 2024-09-26 DIAGNOSIS — E11.9 TYPE 2 DIABETES MELLITUS WITHOUT COMPLICATION, WITHOUT LONG-TERM CURRENT USE OF INSULIN (HCC): Primary | ICD-10-CM

## 2024-09-26 DIAGNOSIS — E78.5 HYPERLIPIDEMIA, UNSPECIFIED HYPERLIPIDEMIA TYPE: ICD-10-CM

## 2024-09-26 LAB
ALBUMIN SERPL BCG-MCNC: 4.3 G/DL (ref 3.5–5)
ALP SERPL-CCNC: 69 U/L (ref 34–104)
ALT SERPL W P-5'-P-CCNC: 19 U/L (ref 7–52)
ANION GAP SERPL CALCULATED.3IONS-SCNC: 10 MMOL/L (ref 4–13)
AST SERPL W P-5'-P-CCNC: 21 U/L (ref 13–39)
BASOPHILS # BLD AUTO: 0.04 THOUSANDS/ΜL (ref 0–0.1)
BASOPHILS NFR BLD AUTO: 1 % (ref 0–1)
BILIRUB SERPL-MCNC: 0.53 MG/DL (ref 0.2–1)
BUN SERPL-MCNC: 13 MG/DL (ref 5–25)
CALCIUM SERPL-MCNC: 9.2 MG/DL (ref 8.4–10.2)
CHLORIDE SERPL-SCNC: 104 MMOL/L (ref 96–108)
CHOLEST SERPL-MCNC: 175 MG/DL
CO2 SERPL-SCNC: 26 MMOL/L (ref 21–32)
CREAT SERPL-MCNC: 0.49 MG/DL (ref 0.6–1.3)
EOSINOPHIL # BLD AUTO: 0.09 THOUSAND/ΜL (ref 0–0.61)
EOSINOPHIL NFR BLD AUTO: 1 % (ref 0–6)
ERYTHROCYTE [DISTWIDTH] IN BLOOD BY AUTOMATED COUNT: 12.5 % (ref 11.6–15.1)
EST. AVERAGE GLUCOSE BLD GHB EST-MCNC: 154 MG/DL
GFR SERPL CREATININE-BSD FRML MDRD: 110 ML/MIN/1.73SQ M
GLUCOSE P FAST SERPL-MCNC: 121 MG/DL (ref 65–99)
HBA1C MFR BLD: 7 %
HCT VFR BLD AUTO: 43.6 % (ref 34.8–46.1)
HDLC SERPL-MCNC: 56 MG/DL
HGB BLD-MCNC: 14.4 G/DL (ref 11.5–15.4)
IMM GRANULOCYTES # BLD AUTO: 0.02 THOUSAND/UL (ref 0–0.2)
IMM GRANULOCYTES NFR BLD AUTO: 0 % (ref 0–2)
LDLC SERPL CALC-MCNC: 98 MG/DL (ref 0–100)
LYMPHOCYTES # BLD AUTO: 2.69 THOUSANDS/ΜL (ref 0.6–4.47)
LYMPHOCYTES NFR BLD AUTO: 38 % (ref 14–44)
MCH RBC QN AUTO: 29.6 PG (ref 26.8–34.3)
MCHC RBC AUTO-ENTMCNC: 33 G/DL (ref 31.4–37.4)
MCV RBC AUTO: 90 FL (ref 82–98)
MONOCYTES # BLD AUTO: 0.33 THOUSAND/ΜL (ref 0.17–1.22)
MONOCYTES NFR BLD AUTO: 5 % (ref 4–12)
NEUTROPHILS # BLD AUTO: 3.89 THOUSANDS/ΜL (ref 1.85–7.62)
NEUTS SEG NFR BLD AUTO: 55 % (ref 43–75)
NRBC BLD AUTO-RTO: 0 /100 WBCS
PLATELET # BLD AUTO: 198 THOUSANDS/UL (ref 149–390)
PMV BLD AUTO: 13 FL (ref 8.9–12.7)
POTASSIUM SERPL-SCNC: 4.2 MMOL/L (ref 3.5–5.3)
PROT SERPL-MCNC: 7 G/DL (ref 6.4–8.4)
RBC # BLD AUTO: 4.87 MILLION/UL (ref 3.81–5.12)
SODIUM SERPL-SCNC: 140 MMOL/L (ref 135–147)
TRIGL SERPL-MCNC: 106 MG/DL
TSH SERPL DL<=0.05 MIU/L-ACNC: 2.25 UIU/ML (ref 0.45–4.5)
WBC # BLD AUTO: 7.06 THOUSAND/UL (ref 4.31–10.16)

## 2024-09-26 PROCEDURE — 36415 COLL VENOUS BLD VENIPUNCTURE: CPT

## 2024-09-26 PROCEDURE — 85025 COMPLETE CBC W/AUTO DIFF WBC: CPT

## 2024-09-26 PROCEDURE — 80053 COMPREHEN METABOLIC PANEL: CPT

## 2024-09-26 PROCEDURE — 80061 LIPID PANEL: CPT

## 2024-09-26 PROCEDURE — 90673 RIV3 VACCINE NO PRESERV IM: CPT

## 2024-09-26 PROCEDURE — 90471 IMMUNIZATION ADMIN: CPT

## 2024-09-26 PROCEDURE — 99214 OFFICE O/P EST MOD 30 MIN: CPT | Performed by: NURSE PRACTITIONER

## 2024-09-26 PROCEDURE — 84443 ASSAY THYROID STIM HORMONE: CPT

## 2024-09-26 PROCEDURE — 83036 HEMOGLOBIN GLYCOSYLATED A1C: CPT

## 2024-09-26 RX ORDER — BIMATOPROST 0.1 MG/ML
1 SOLUTION/ DROPS OPHTHALMIC
COMMUNITY
Start: 2024-08-30

## 2024-09-26 RX ORDER — QUETIAPINE FUMARATE 150 MG/1
1 TABLET, FILM COATED ORAL
Qty: 90 TABLET | Refills: 1 | Status: SHIPPED | OUTPATIENT
Start: 2024-09-26

## 2024-09-26 RX ORDER — REPAGLINIDE 1 MG/1
1 TABLET ORAL
Qty: 270 TABLET | Refills: 1 | Status: SHIPPED | OUTPATIENT
Start: 2024-09-26

## 2024-09-26 NOTE — ASSESSMENT & PLAN NOTE
- Well controlled on lisinopril 10 mg daily. Continue same.   - Will continue to monitor.    Orders:    CBC and differential; Future    Comprehensive metabolic panel; Future    Lipid Panel with Direct LDL reflex; Future    TSH, 3rd generation with Free T4 reflex; Future

## 2024-09-26 NOTE — ASSESSMENT & PLAN NOTE
- Well controlled on Prandin and Synjardy. Continue same.   - Encourage low carb diet and regular exercise.  - Foot exam performed in office today.   - Will continue to monitor.   Lab Results   Component Value Date    HGBA1C 6.3 02/14/2024       Orders:    repaglinide (PRANDIN) 1 mg tablet; Take 1 tablet (1 mg total) by mouth 3 (three) times a day before meals    Comprehensive metabolic panel; Future    Hemoglobin A1C; Future    Lipid Panel with Direct LDL reflex; Future    Albumin / creatinine urine ratio; Future

## 2024-09-26 NOTE — ASSESSMENT & PLAN NOTE
Component      Latest Ref Rng & Units 7/27/2022 1/18/2023   Cholesterol      See Comment mg/dL 164 162   Triglycerides      See Comment mg/dL 86 91   HDL      >=50 mg/dL 51 55   LDL Calculated      0 - 100 mg/dL 96 89      - Well controlled.  - Continue Crestor 10 mg daily.  - Encourage healthy diet and regular exercise.  - Will continue to monitor fasting lipid panel.     Orders:    Lipid Panel with Direct LDL reflex; Future

## 2024-09-26 NOTE — ASSESSMENT & PLAN NOTE
- Well controlled on Zoloft and Seroquel. Continue same.   - Will continue to monitor.    Orders:    QUEtiapine Fumarate 150 MG TABS; Take 1 tablet by mouth daily at bedtime

## 2024-09-26 NOTE — PROGRESS NOTES
Ambulatory Visit  Name: Ewa Nolan      : 1970      MRN: [id number]  Encounter Provider: GHISLAINE Dukes  Encounter Date: 2024   Encounter department: [facility]    Assessment & Plan  Type 2 diabetes mellitus without complication, without long-term current use of insulin (HCC)  - Well controlled on Prandin and Synjardy. Continue same.   - Encourage low carb diet and regular exercise.  - Foot exam performed in office today.   - Will continue to monitor.   Lab Results   Component Value Date    HGBA1C 6.3 2024       Orders:    repaglinide (PRANDIN) 1 mg tablet; Take 1 tablet (1 mg total) by mouth 3 (three) times a day before meals    Comprehensive metabolic panel; Future    Hemoglobin A1C; Future    Lipid Panel with Direct LDL reflex; Future    Albumin / creatinine urine ratio; Future    Essential hypertension  - Well controlled on lisinopril 10 mg daily. Continue same.   - Will continue to monitor.    Orders:    CBC and differential; Future    Comprehensive metabolic panel; Future    Lipid Panel with Direct LDL reflex; Future    TSH, 3rd generation with Free T4 reflex; Future    Hyperlipidemia, unspecified hyperlipidemia type  Component      Latest Ref Rng & Units 2022   Cholesterol      See Comment mg/dL 164 162   Triglycerides      See Comment mg/dL 86 91   HDL      >=50 mg/dL 51 55   LDL Calculated      0 - 100 mg/dL 96 89      - Well controlled.  - Continue Crestor 10 mg daily.  - Encourage healthy diet and regular exercise.  - Will continue to monitor fasting lipid panel.     Orders:    Lipid Panel with Direct LDL reflex; Future    Mild intermittent asthma without complication  - Well controlled.  - Continue albuterol as needed.  - Will continue to monitor.         Gastroesophageal reflux disease without esophagitis  - Stable on omeprazole 40 mg daily. Continue same.   - Avoid triggering food and beverage.  - Will continue to monitor.           Other depression  - Well controlled on Zoloft and Seroquel. Continue same.   - Will continue to monitor.    Orders:    QUEtiapine Fumarate 150 MG TABS; Take 1 tablet by mouth daily at bedtime    Immunization due    Orders:    influenza vaccine, recombinant, PF, 0.5 mL IM (Flublok)         History of Present Illness     Patient with PMH of GERD, HTN, HLD, type 2 diabetes, and depression presents to office today for follow up. She is taking her prescribed medications and reports no side effects. She had her blood work done this morning - results still pending. Hemoglobin A1c in the office today is well controlled at 6.8. She denies any concerns or complaints.         Diabetes  She has type 2 diabetes mellitus. No MedicAlert identification noted. The initial diagnosis of diabetes was made 7 years ago. Pertinent negatives for hypoglycemia include no confusion, dizziness, headaches, hunger, mood changes, nervousness/anxiousness, pallor, seizures, sleepiness, speech difficulty, sweats or tremors. Pertinent negatives for diabetes include no blurred vision, no chest pain, no fatigue, no foot paresthesias, no foot ulcerations, no polydipsia, no polyphagia, no polyuria, no visual change, no weakness and no weight loss. Pertinent negatives for hypoglycemia complications include no blackouts, no hospitalization, no nocturnal hypoglycemia, no required assistance and no required glucagon injection. Symptoms are stable. Pertinent negatives for diabetic complications include no CVA, heart disease, impotence, nephropathy, peripheral neuropathy, PVD or retinopathy. Risk factors for coronary artery disease include obesity. Current diabetic treatment includes diet and oral agent (dual therapy). She is compliant with treatment most of the time. Her weight is stable. She is following a diabetic diet. Meal planning includes avoidance of concentrated sweets. She has not had a previous visit with a dietitian. She rarely participates in  exercise. Blood glucose monitoring compliance is inadequate. There is no change in her home blood glucose trend. Her breakfast blood glucose is taken between 7-8 am. Her breakfast blood glucose range is generally 110-130 mg/dl. Her lunch blood glucose is taken between 11-12 pm. Her dinner blood glucose is taken between 5-6 pm. Her bedtime blood glucose is taken between 9-10 pm. She does not see a podiatrist.Eye exam is current.     Review of Systems   Constitutional:  Negative for fatigue, fever and weight loss.   HENT:  Negative for trouble swallowing.    Eyes:  Negative for blurred vision and visual disturbance.   Respiratory:  Negative for cough and shortness of breath.    Cardiovascular:  Negative for chest pain and palpitations.   Gastrointestinal:  Negative for abdominal pain and blood in stool.   Endocrine: Negative for cold intolerance, heat intolerance, polydipsia, polyphagia and polyuria.   Genitourinary:  Negative for difficulty urinating, dysuria and impotence.   Musculoskeletal:  Negative for gait problem.   Skin:  Negative for pallor and rash.   Neurological:  Negative for dizziness, tremors, seizures, syncope, speech difficulty, weakness and headaches.   Hematological:  Negative for adenopathy.   Psychiatric/Behavioral:  Negative for behavioral problems and confusion. The patient is not nervous/anxious.      Past Medical History:   Diagnosis Date    Asthma     exercise induced    Colon polyp     COVID-19     Diabetes mellitus (HCC)     Encounter for screening for malignant neoplasm of colon 5/7/2019    GERD (gastroesophageal reflux disease)     Psychiatric disorder     depression     Past Surgical History:   Procedure Laterality Date    COLONOSCOPY      UPPER GASTROINTESTINAL ENDOSCOPY       Family History   Problem Relation Age of Onset    Hypertension Mother     Diabetes type II Mother     No Known Problems Father     Breast cancer Sister 44    BRCA1 Negative Sister     BRCA2 Negative Sister      Breast cancer Maternal Aunt         age unknown    Breast cancer Maternal Aunt         age unknown    Breast cancer Maternal Aunt         age unknown    No Known Problems Maternal Grandmother     No Known Problems Maternal Grandfather     No Known Problems Paternal Grandmother     No Known Problems Paternal Grandfather     No Known Problems Son     No Known Problems Sister     No Known Problems Sister      Social History     Tobacco Use    Smoking status: Former     Current packs/day: 0.00     Types: Cigarettes     Quit date: 1994     Years since quittin.7    Smokeless tobacco: Never    Tobacco comments:     No passive smoke exposure   Vaping Use    Vaping status: Never Used   Substance and Sexual Activity    Alcohol use: Never    Drug use: Never    Sexual activity: Not on file     Current Outpatient Medications on File Prior to Visit   Medication Sig    Empagliflozin-metFORMIN HCl ER (Synjardy XR)  MG TB24 TAKE 1 TABLET BY MOUTH EVERY DAY    fluticasone (FLONASE) 50 mcg/act nasal spray 2 sprays into each nostril daily    lisinopril (ZESTRIL) 10 mg tablet TAKE 1 TABLET BY MOUTH EVERY DAY    Lumigan 0.01 % ophthalmic drops Administer 1 drop to both eyes daily at bedtime    omeprazole (PriLOSEC) 40 MG capsule Take 1 capsule (40 mg total) by mouth daily    rosuvastatin (CRESTOR) 10 MG tablet TAKE 1 TABLET BY MOUTH EVERY DAY    sertraline (ZOLOFT) 50 mg tablet TAKE 1 TABLET BY MOUTH EVERY DAY    [DISCONTINUED] albuterol (PROVENTIL HFA,VENTOLIN HFA) 90 mcg/act inhaler Inhale 2 puffs every 6 (six) hours as needed for wheezing or shortness of breath    [DISCONTINUED] QUEtiapine Fumarate 150 MG TABS TAKE 1 TABLET BY MOUTH EVERYDAY AT BEDTIME    [DISCONTINUED] repaglinide (PRANDIN) 1 mg tablet Take 1 tablet (1 mg total) by mouth 3 (three) times a day before meals    [DISCONTINUED] clotrimazole-betamethasone (LOTRISONE) 1-0.05 % cream Apply topically 2 (two) times a day    [DISCONTINUED] cyclobenzaprine  "(FLEXERIL) 5 mg tablet Take 1 tablet (5 mg total) by mouth 3 (three) times a day as needed for muscle spasms    [DISCONTINUED] ibuprofen (MOTRIN) 600 mg tablet Take 1 tablet (600 mg total) by mouth every 6 (six) hours as needed for mild pain or headaches    [DISCONTINUED] methylPREDNISolone 4 MG tablet therapy pack Use as directed on package    [DISCONTINUED] norethindrone (MICRONOR) 0.35 MG tablet Take 1 tablet by mouth daily (Patient not taking: Reported on 4/26/2023)     Allergies   Allergen Reactions    Amoxicillin Hives     Immunization History   Administered Date(s) Administered    COVID-19 Moderna mRNA Vaccine 12 Yr+ 50 mcg/0.5 mL (Spikevax) 10/07/2023    COVID-19 PFIZER VACCINE 0.3 ML IM 01/15/2021, 02/05/2021, 12/16/2021    INFLUENZA 10/03/2016, 09/30/2018, 11/06/2020, 10/07/2023    Influenza Recombinant Preservative Free Im 09/26/2024    Influenza, injectable, quadrivalent, preservative free 0.5 mL 09/30/2018, 09/29/2019, 11/06/2020    Pneumococcal Polysaccharide PPV23 10/18/2016    Tdap 10/18/2016    Zoster Vaccine Recombinant 02/09/2022, 07/27/2022     Objective     /78 (BP Location: Left arm, Patient Position: Sitting, Cuff Size: Large)   Pulse 93   Temp 97.5 °F (36.4 °C) (Tympanic)   Resp 16   Ht 5' 4\" (1.626 m)   Wt 107 kg (235 lb 6.4 oz)   LMP 02/18/2021   SpO2 96%   BMI 40.41 kg/m²     Physical Exam  Vitals and nursing note reviewed.   Constitutional:       Appearance: Normal appearance. She is well-developed.   HENT:      Head: Normocephalic and atraumatic.      Right Ear: External ear normal.      Left Ear: External ear normal.   Eyes:      Conjunctiva/sclera: Conjunctivae normal.   Cardiovascular:      Rate and Rhythm: Normal rate and regular rhythm.      Heart sounds: Normal heart sounds.   Pulmonary:      Effort: Pulmonary effort is normal.      Breath sounds: Normal breath sounds.   Abdominal:      General: Bowel sounds are normal.      Palpations: Abdomen is soft. "   Musculoskeletal:         General: Normal range of motion.      Cervical back: Normal range of motion.   Skin:     General: Skin is warm and dry.   Neurological:      Mental Status: She is alert and oriented to person, place, and time.   Psychiatric:         Mood and Affect: Mood normal.         Behavior: Behavior normal.

## 2024-09-26 NOTE — PROGRESS NOTES
Diabetic Foot Exam    Patient's shoes and socks removed.    Right Foot/Ankle   Right Foot Inspection  Skin Exam: skin normal and skin intact. No dry skin, no warmth, no callus, no erythema, no maceration, no abnormal color, no pre-ulcer, no ulcer and no callus.     Toe Exam: ROM and strength within normal limits.     Sensory   Monofilament testing: intact    Vascular  The right DP pulse is 2+.     Left Foot/Ankle  Left Foot Inspection  Skin Exam: skin normal and skin intact. No dry skin, no warmth, no erythema, no maceration, normal color, no pre-ulcer, no ulcer and no callus.     Toe Exam: ROM and strength within normal limits.     Sensory   Monofilament testing: intact    Vascular  The left DP pulse is 2+.     Assign Risk Category  No deformity present  No loss of protective sensation  No weak pulses  Risk: 0    Answers submitted by the patient for this visit:  Diabetes Questionnaire (Submitted on 9/25/2024)  Chief Complaint: Diabetes problem  Diabetes type: type 2  MedicAlert ID: No  Disease duration: 7 Years  blurred vision: No  chest pain: No  fatigue: No  foot paresthesias: No  foot ulcerations: No  polydipsia: No  polyphagia: No  polyuria: No  visual change: No  weakness: No  weight loss: No  Symptom course: stable  confusion: No  speech difficulty: No  dizziness: No  nervous/anxious: No  headaches: No  hunger: No  mood changes: No  pallor: No  seizures: No  tremors: No  sleepiness: No  sweats: No  blackouts: No  hospitalization: No  nocturnal hypoglycemia: No  required assistance: No  required glucagon: No  CVA: No  heart disease: No  impotence: No  nephropathy: No  peripheral neuropathy: No  PVD: No  retinopathy: No  CAD risks: obesity  Current treatments: diet, oral agent (dual therapy)  Treatment compliance: most of the time  Monitoring compliance: inadequate  Blood glucose trend: no change  breakfast time: 7-8 am  breakfast glucose level: 110-130  lunch time: 11-12 pm  dinner time: 5-6 pm  Bedtime: 9-10  pm  Weight trend: stable  Current diet: diabetic  Meal planning: avoidance of concentrated sweets  Exercise: rarely  Dietitian visit: No  Eye exam current: Yes  Sees podiatrist: No

## 2024-09-27 DIAGNOSIS — E11.9 TYPE 2 DIABETES MELLITUS WITHOUT COMPLICATION, WITHOUT LONG-TERM CURRENT USE OF INSULIN (HCC): ICD-10-CM

## 2024-09-27 RX ORDER — EMPAGLIFLOZIN, METFORMIN HYDROCHLORIDE 25; 1000 MG/1; MG/1
1 TABLET, EXTENDED RELEASE ORAL DAILY
Qty: 90 TABLET | Refills: 1 | Status: SHIPPED | OUTPATIENT
Start: 2024-09-27

## 2024-10-01 ENCOUNTER — TELEPHONE (OUTPATIENT)
Dept: FAMILY MEDICINE CLINIC | Facility: CLINIC | Age: 54
End: 2024-10-01

## 2024-10-01 NOTE — TELEPHONE ENCOUNTER
----- Message from GHISLAINE Whittaker sent at 10/1/2024 12:41 PM EDT -----  Labs are all stable.

## 2024-10-04 ENCOUNTER — TELEPHONE (OUTPATIENT)
Dept: GASTROENTEROLOGY | Facility: CLINIC | Age: 54
End: 2024-10-04

## 2024-10-07 ENCOUNTER — PREP FOR PROCEDURE (OUTPATIENT)
Dept: GASTROENTEROLOGY | Facility: CLINIC | Age: 54
End: 2024-10-07

## 2024-10-07 DIAGNOSIS — Z86.0100 HX OF COLONIC POLYPS: Primary | ICD-10-CM

## 2024-10-07 NOTE — TELEPHONE ENCOUNTER
Scheduled date of EGD/colonoscopy (as of today):02.26.25  Physician performing EGD/colonoscopy:DR MOSS  Location of EGD/colonoscopy:  Desired bowel prep reviewed with patient:ERICA/WILBER  Instructions reviewed with patient by:[email or web address]  Clearances: N/A

## 2024-10-13 DIAGNOSIS — K22.70 BARRETT'S ESOPHAGUS WITHOUT DYSPLASIA: ICD-10-CM

## 2024-10-15 RX ORDER — OMEPRAZOLE 40 MG/1
40 CAPSULE, DELAYED RELEASE ORAL DAILY
Qty: 90 CAPSULE | Refills: 0 | Status: SHIPPED | OUTPATIENT
Start: 2024-10-15

## 2024-11-13 ENCOUNTER — OFFICE VISIT (OUTPATIENT)
Dept: OBGYN CLINIC | Facility: CLINIC | Age: 54
End: 2024-11-13
Payer: COMMERCIAL

## 2024-11-13 VITALS — DIASTOLIC BLOOD PRESSURE: 72 MMHG | WEIGHT: 238.4 LBS | BODY MASS INDEX: 40.92 KG/M2 | SYSTOLIC BLOOD PRESSURE: 110 MMHG

## 2024-11-13 DIAGNOSIS — Z01.419 ENCNTR FOR GYN EXAM (GENERAL) (ROUTINE) W/O ABN FINDINGS: Primary | ICD-10-CM

## 2024-11-13 DIAGNOSIS — Z12.31 ENCOUNTER FOR SCREENING MAMMOGRAM FOR MALIGNANT NEOPLASM OF BREAST: ICD-10-CM

## 2024-11-13 PROCEDURE — S0610 ANNUAL GYNECOLOGICAL EXAMINA: HCPCS | Performed by: STUDENT IN AN ORGANIZED HEALTH CARE EDUCATION/TRAINING PROGRAM

## 2024-11-13 NOTE — ASSESSMENT & PLAN NOTE
-pap: 9/28/22 NILM HPV neg   -mammogram: 8/14/24 BIRADs 1   -colonoscopy: 2018, repeat in 5 years scheduled 2/26/25  -denies postmenopausal bleeding   -not currently sexually active  -STD testing: denies   -smoking: denies   -drinks alcohol socially  -recommend 30 min of exercise daily   -denies family history of ovarian, colon cancer  -sister with breast cancer BRCA negative, maternal aunt with breast cancer   -return in one year or earlier if needed

## 2024-11-13 NOTE — PROGRESS NOTES
Ambulatory Visit  Name: Ewa Nolan      : 1970      MRN: [id number]  Encounter Provider: Susan Love DO  Encounter Date: 2024   Encounter department: [facility] [place]    Assessment & Plan  Encntr for gyn exam (general) (routine) w/o abn findings  -pap: 22 NILM HPV neg   -mammogram: 24 BIRADs 1   -colonoscopy: , repeat in 5 years scheduled 25  -denies postmenopausal bleeding   -not currently sexually active  -STD testing: denies   -smoking: denies   -drinks alcohol socially  -recommend 30 min of exercise daily   -denies family history of ovarian, colon cancer  -sister with breast cancer BRCA negative, maternal aunt with breast cancer   -return in one year or earlier if needed          Encounter for screening mammogram for malignant neoplasm of breast    Orders:    Mammo screening bilateral w 3d and cad; Future      History of Present Illness     Ewa Nolan is a 53 y.o. female  postmenopausal presents for annual exam. Has no acute complaints.       Review of Systems   Constitutional:  Negative for appetite change, chills, fatigue and fever.   Respiratory:  Negative for cough, chest tightness, shortness of breath and wheezing.    Cardiovascular:  Negative for chest pain, palpitations and leg swelling.   Gastrointestinal:  Negative for abdominal distention, abdominal pain, constipation, diarrhea, nausea and vomiting.   Endocrine: Negative for cold intolerance, heat intolerance and polyuria.   Genitourinary:  Negative for difficulty urinating, dyspareunia, dysuria, genital sores, menstrual problem, vaginal bleeding, vaginal discharge and vaginal pain.   Neurological:  Negative for dizziness, weakness, light-headedness and headaches.           Objective     /72   Wt 108 kg (238 lb 6.4 oz)   LMP 2021   BMI 40.92 kg/m²     Physical Exam  Constitutional:       General: She is not in acute distress.     Appearance:  Normal appearance. She is not ill-appearing.   Cardiovascular:      Rate and Rhythm: Normal rate.   Pulmonary:      Effort: Pulmonary effort is normal. No respiratory distress.   Chest:   Breasts:     Breasts are symmetrical.      Right: Normal. No swelling, bleeding, inverted nipple, mass, nipple discharge, skin change or tenderness.      Left: Normal. No swelling, bleeding, inverted nipple, mass, nipple discharge, skin change or tenderness.   Abdominal:      General: Abdomen is flat. There is no distension.      Palpations: Abdomen is soft.      Tenderness: There is no abdominal tenderness. There is no guarding or rebound.   Genitourinary:     General: Normal vulva.      Exam position: Lithotomy position.      Labia:         Right: No rash, tenderness, lesion or injury.         Left: No rash, tenderness, lesion or injury.       Vagina: Normal. No foreign body. No vaginal discharge, erythema, tenderness, bleeding or lesions.      Cervix: Normal. No cervical motion tenderness, discharge, friability, lesion, erythema, cervical bleeding or eversion.      Uterus: Normal. Not enlarged, not fixed, not tender and no uterine prolapse.       Adnexa: Right adnexa normal and left adnexa normal.        Right: No mass, tenderness or fullness.          Left: No mass, tenderness or fullness.        Comments: Vaginal atrophy present   Musculoskeletal:      Right lower leg: No edema.      Left lower leg: No edema.   Lymphadenopathy:      Upper Body:      Right upper body: No supraclavicular, axillary or pectoral adenopathy.      Left upper body: No supraclavicular, axillary or pectoral adenopathy.   Neurological:      General: No focal deficit present.      Mental Status: She is alert and oriented to person, place, and time.   Psychiatric:         Mood and Affect: Mood normal.         Behavior: Behavior normal.         Thought Content: Thought content normal.         Judgment: Judgment normal.

## 2024-11-27 ENCOUNTER — OFFICE VISIT (OUTPATIENT)
Dept: PODIATRY | Facility: CLINIC | Age: 54
End: 2024-11-27
Payer: COMMERCIAL

## 2024-11-27 VITALS — BODY MASS INDEX: 40.63 KG/M2 | HEIGHT: 64 IN | WEIGHT: 238 LBS

## 2024-11-27 DIAGNOSIS — M72.2 PLANTAR FASCIITIS: Primary | ICD-10-CM

## 2024-11-27 DIAGNOSIS — M79.672 LEFT FOOT PAIN: ICD-10-CM

## 2024-11-27 PROCEDURE — 20550 NJX 1 TENDON SHEATH/LIGAMENT: CPT

## 2024-11-27 PROCEDURE — 99204 OFFICE O/P NEW MOD 45 MIN: CPT

## 2024-11-27 RX ADMIN — LIDOCAINE HYDROCHLORIDE 0.5 ML: 10 INJECTION, SOLUTION EPIDURAL; INFILTRATION; INTRACAUDAL; PERINEURAL at 13:30

## 2024-11-27 RX ADMIN — TRIAMCINOLONE ACETONIDE 0.5 MG: 40 INJECTION, SUSPENSION INTRA-ARTICULAR; INTRAMUSCULAR at 13:30

## 2024-11-27 NOTE — PROGRESS NOTES
Name: Ewa Nolan      : 1970      MRN: [id number]  Encounter Provider: Felix Flores DPM  Encounter Date: 2024   Encounter department: [facility] [place]  :  Assessment & Plan  Plantar fasciitis    Orders:    XR foot 3+ vw left    Ambulatory Referral to Physical Therapy; Future    Left foot pain    Orders:    XR foot 3+ vw left    Ambulatory Referral to Physical Therapy; Future    -Patient was educated regarding their condition.  -Educated patient on plantar fasciitis stretching program to be performed daily  -Recommend purchase of supportive shoes with wide toe box. Recommend purchase of OTC orthosis (superfeet, powersteps, or tread labs).  -Referral to formal physical therapy made  -Corticosteroid injection given today  -Patient will RTC in 6-weeks    -X-ray from today of the left foot was interpreted by myself: no acute osseous abnormalities noted    History of Present Illness     HPI  Ewa Nolan is a 53 y.o. female who presents with left foot pain. Patient has had plantar fasciitis before on the other foot and says this pain feels similar. She has been having pain for the past few months. She has been stretching at home. She has no other concerns today.    History obtained from: patient    Review of Systems  Past Medical History   Past Medical History:   Diagnosis Date    Asthma     exercise induced    Colon polyp     COVID-19     Depression     Diabetes mellitus (HCC)     Encounter for screening for malignant neoplasm of colon 2019    GERD (gastroesophageal reflux disease)     Psychiatric disorder     depression    Varicella      Past Surgical History:   Procedure Laterality Date     SECTION  1998    CHOLECYSTECTOMY  2003    COLONOSCOPY      UPPER GASTROINTESTINAL ENDOSCOPY       Family History   Problem Relation Age of Onset    Hypertension Mother     Diabetes type II Mother     No Known Problems Father     Breast cancer Sister 44    BRCA1 Negative  "Sister     BRCA2 Negative Sister     Breast cancer Maternal Aunt         age unknown    Breast cancer Maternal Aunt         age unknown    Breast cancer Maternal Aunt         age unknown    No Known Problems Maternal Grandmother     No Known Problems Maternal Grandfather     No Known Problems Paternal Grandmother     No Known Problems Paternal Grandfather     No Known Problems Son     No Known Problems Sister     No Known Problems Sister     Stroke Brother       reports that she quit smoking about 30 years ago. Her smoking use included cigarettes. She has never used smokeless tobacco. She reports that she does not currently use alcohol. She reports that she does not currently use drugs.  Current Outpatient Medications on File Prior to Visit   Medication Sig Dispense Refill    Empagliflozin-metFORMIN HCl ER (Synjardy XR)  MG TB24 TAKE 1 TABLET BY MOUTH EVERY DAY 90 tablet 1    fluticasone (FLONASE) 50 mcg/act nasal spray 2 sprays into each nostril daily 16 g 1    lisinopril (ZESTRIL) 10 mg tablet TAKE 1 TABLET BY MOUTH EVERY DAY 90 tablet 1    Lumigan 0.01 % ophthalmic drops Administer 1 drop to both eyes daily at bedtime      omeprazole (PriLOSEC) 40 MG capsule Take 1 capsule (40 mg total) by mouth daily 90 capsule 0    QUEtiapine Fumarate 150 MG TABS Take 1 tablet by mouth daily at bedtime 90 tablet 1    repaglinide (PRANDIN) 1 mg tablet Take 1 tablet (1 mg total) by mouth 3 (three) times a day before meals 270 tablet 1    rosuvastatin (CRESTOR) 10 MG tablet TAKE 1 TABLET BY MOUTH EVERY DAY 90 tablet 1    sertraline (ZOLOFT) 50 mg tablet TAKE 1 TABLET BY MOUTH EVERY DAY 90 tablet 1     No current facility-administered medications on file prior to visit.     Allergies   Allergen Reactions    Amoxicillin Hives        Objective   Ht 5' 4\" (1.626 m)   Wt 108 kg (238 lb)   LMP 02/18/2021   BMI 40.85 kg/m²      Physical Exam  MSK:  -Pain on palpation of medial calcaneal tubercle at the insertion site of the " plantar fascia  -no pain with compression of both sides of heel  -No gross deformities noted  -Active range of motion lesser digits intact  -MMT 5/5 to all muscle compartments of the lower extremity  -Ankle dorsiflexion is less than 10 degrees with knee extended, and knee flexed    Derm:  -No lesions, abrasions, or open wounds noted  -No noted interdigital maceration, peeling, malodor  -No areas of callus formation noted on exam  -no erythema, ecchymosis, edema noted     Vascular:  -DP and PT pulses intact b/l  -Capillary refill time <2 sec b/l  -Skin temp: WNL    Neuro:  -Light sensation intact bilaterally  -Protective sensation intact bilaterally  -Tinel sign negative    Foot/lower extremity injection    Performed by: Felix Flores DPM  Authorized by: Felix Flores DPM    Procedure:     Other Assisting Provider: No      Verbal consent obtained?: Yes      Risks and benefits: Risks, benefits and alternatives were discussed      Consent given by:  Patient    Time out performed at:  11/29/2024 7:55 AM    Patient states understanding of procedure being performed: Yes      Patient identity confirmed:  Verbally with patient    Supporting Documentation:     Indications:  Pain    Procedure Details:    Prep: patient was prepped and draped in usual sterile fashion                Ethyl Chloride was applied      Needle size: 27 G G    Ultrasound Guidance: no      Approach:  Medial    Laterality:  Left    Location: aponeurosis      Aponeurosis Structures: Plantar fascia origin      Injection Information:       Medications:  0.5 mL lidocaine (PF) 1 %; 0.5 mg triamcinolone acetonide 40 mg/mL    Patient tolerance:  Patient tolerated the procedure well with no immediate complications    Dressing: sterile dressing applied

## 2024-11-29 RX ORDER — TRIAMCINOLONE ACETONIDE 40 MG/ML
0.5 INJECTION, SUSPENSION INTRA-ARTICULAR; INTRAMUSCULAR
Status: SHIPPED | OUTPATIENT
Start: 2024-11-27

## 2024-11-29 RX ORDER — LIDOCAINE HYDROCHLORIDE 10 MG/ML
0.5 INJECTION, SOLUTION EPIDURAL; INFILTRATION; INTRACAUDAL; PERINEURAL
Status: SHIPPED | OUTPATIENT
Start: 2024-11-27

## 2024-12-02 DIAGNOSIS — F32.89 OTHER DEPRESSION: ICD-10-CM

## 2024-12-11 LAB
LEFT EYE DIABETIC RETINOPATHY: NORMAL
RIGHT EYE DIABETIC RETINOPATHY: NORMAL

## 2024-12-13 PROBLEM — Z01.419 ENCNTR FOR GYN EXAM (GENERAL) (ROUTINE) W/O ABN FINDINGS: Status: RESOLVED | Noted: 2024-11-13 | Resolved: 2024-12-13

## 2025-01-12 DIAGNOSIS — K22.70 BARRETT'S ESOPHAGUS WITHOUT DYSPLASIA: ICD-10-CM

## 2025-01-13 RX ORDER — OMEPRAZOLE 40 MG/1
40 CAPSULE, DELAYED RELEASE ORAL DAILY
Qty: 90 CAPSULE | Refills: 1 | Status: SHIPPED | OUTPATIENT
Start: 2025-01-13

## 2025-02-04 DIAGNOSIS — E11.9 TYPE 2 DIABETES MELLITUS WITHOUT COMPLICATION, WITHOUT LONG-TERM CURRENT USE OF INSULIN (HCC): ICD-10-CM

## 2025-02-05 RX ORDER — REPAGLINIDE 1 MG/1
1 TABLET ORAL
Qty: 270 TABLET | Refills: 1 | Status: SHIPPED | OUTPATIENT
Start: 2025-02-05

## 2025-02-26 ENCOUNTER — HOSPITAL ENCOUNTER (OUTPATIENT)
Dept: GASTROENTEROLOGY | Facility: HOSPITAL | Age: 55
Setting detail: OUTPATIENT SURGERY
Discharge: HOME | End: 2025-02-26
Attending: INTERNAL MEDICINE
Payer: COMMERCIAL

## 2025-02-26 ENCOUNTER — ANESTHESIA (OUTPATIENT)
Dept: GASTROENTEROLOGY | Facility: HOSPITAL | Age: 55
End: 2025-02-26
Payer: COMMERCIAL

## 2025-02-26 ENCOUNTER — ANESTHESIA EVENT (OUTPATIENT)
Dept: GASTROENTEROLOGY | Facility: HOSPITAL | Age: 55
End: 2025-02-26
Payer: COMMERCIAL

## 2025-02-26 VITALS
SYSTOLIC BLOOD PRESSURE: 114 MMHG | DIASTOLIC BLOOD PRESSURE: 63 MMHG | OXYGEN SATURATION: 96 % | RESPIRATION RATE: 18 BRPM | TEMPERATURE: 98.4 F | HEART RATE: 96 BPM

## 2025-02-26 DIAGNOSIS — K22.70 BARRETT'S ESOPHAGUS WITHOUT DYSPLASIA: ICD-10-CM

## 2025-02-26 DIAGNOSIS — Z86.0100 HX OF COLONIC POLYPS: ICD-10-CM

## 2025-02-26 PROBLEM — U07.1 COVID-19 VIRUS DETECTED: Status: RESOLVED | Noted: 2020-10-14 | Resolved: 2025-02-26

## 2025-02-26 PROBLEM — B34.9 VIRAL ILLNESS: Status: RESOLVED | Noted: 2022-02-25 | Resolved: 2025-02-26

## 2025-02-26 PROBLEM — R06.02 SOB (SHORTNESS OF BREATH): Status: RESOLVED | Noted: 2020-10-16 | Resolved: 2025-02-26

## 2025-02-26 PROCEDURE — 88305 TISSUE EXAM BY PATHOLOGIST: CPT | Performed by: PATHOLOGY

## 2025-02-26 PROCEDURE — 45385 COLONOSCOPY W/LESION REMOVAL: CPT | Performed by: INTERNAL MEDICINE

## 2025-02-26 PROCEDURE — 43239 EGD BIOPSY SINGLE/MULTIPLE: CPT | Performed by: INTERNAL MEDICINE

## 2025-02-26 RX ORDER — LIDOCAINE HYDROCHLORIDE 10 MG/ML
0.5 INJECTION, SOLUTION EPIDURAL; INFILTRATION; INTRACAUDAL; PERINEURAL ONCE AS NEEDED
Status: DISCONTINUED | OUTPATIENT
Start: 2025-02-26 | End: 2025-03-02 | Stop reason: HOSPADM

## 2025-02-26 RX ORDER — PROPOFOL 10 MG/ML
INJECTION, EMULSION INTRAVENOUS AS NEEDED
Status: DISCONTINUED | OUTPATIENT
Start: 2025-02-26 | End: 2025-02-26

## 2025-02-26 RX ORDER — SODIUM CHLORIDE, SODIUM LACTATE, POTASSIUM CHLORIDE, CALCIUM CHLORIDE 600; 310; 30; 20 MG/100ML; MG/100ML; MG/100ML; MG/100ML
125 INJECTION, SOLUTION INTRAVENOUS CONTINUOUS
Status: DISCONTINUED | OUTPATIENT
Start: 2025-02-26 | End: 2025-03-02 | Stop reason: HOSPADM

## 2025-02-26 RX ORDER — LIDOCAINE HYDROCHLORIDE 20 MG/ML
INJECTION, SOLUTION EPIDURAL; INFILTRATION; INTRACAUDAL; PERINEURAL AS NEEDED
Status: DISCONTINUED | OUTPATIENT
Start: 2025-02-26 | End: 2025-02-26

## 2025-02-26 RX ORDER — SODIUM CHLORIDE, SODIUM LACTATE, POTASSIUM CHLORIDE, CALCIUM CHLORIDE 600; 310; 30; 20 MG/100ML; MG/100ML; MG/100ML; MG/100ML
INJECTION, SOLUTION INTRAVENOUS CONTINUOUS PRN
Status: DISCONTINUED | OUTPATIENT
Start: 2025-02-26 | End: 2025-02-26

## 2025-02-26 RX ORDER — PROPOFOL 10 MG/ML
INJECTION, EMULSION INTRAVENOUS CONTINUOUS PRN
Status: DISCONTINUED | OUTPATIENT
Start: 2025-02-26 | End: 2025-02-26

## 2025-02-26 RX ADMIN — LIDOCAINE HYDROCHLORIDE 100 MG: 20 INJECTION, SOLUTION EPIDURAL; INFILTRATION; INTRACAUDAL at 07:25

## 2025-02-26 RX ADMIN — SODIUM CHLORIDE, SODIUM LACTATE, POTASSIUM CHLORIDE, AND CALCIUM CHLORIDE 125 ML/HR: .6; .31; .03; .02 INJECTION, SOLUTION INTRAVENOUS at 07:19

## 2025-02-26 RX ADMIN — SODIUM CHLORIDE, SODIUM LACTATE, POTASSIUM CHLORIDE, AND CALCIUM CHLORIDE: .6; .31; .03; .02 INJECTION, SOLUTION INTRAVENOUS at 07:18

## 2025-02-26 RX ADMIN — PROPOFOL 140 MCG/KG/MIN: 10 INJECTION, EMULSION INTRAVENOUS at 07:26

## 2025-02-26 RX ADMIN — PROPOFOL 150 MG: 10 INJECTION, EMULSION INTRAVENOUS at 07:25

## 2025-02-26 NOTE — ANESTHESIA POSTPROCEDURE EVALUATION
Post-Op Assessment Note    CV Status:  Stable  Pain Score: 0    Pain management: adequate       Mental Status:  Sleepy and arousable   PONV Controlled:  None   Airway Patency:  Patent     Post Op Vitals Reviewed: Yes    No anethesia notable event occurred.    Staff: CRNA           Last Filed PACU Vitals:  Vitals Value Taken Time   Temp 97    Pulse 100    /67    Resp 16    SpO2 97

## 2025-02-26 NOTE — ANESTHESIA PREPROCEDURE EVALUATION
Procedure:  EGD  COLONOSCOPY    Relevant Problems   ANESTHESIA   (-) History of anesthesia complications      CARDIO   (+) Essential hypertension   (+) Hyperlipidemia      ENDO   (+) Type 2 diabetes mellitus without complication (HCC)      GI/HEPATIC   (+) Esophageal reflux      NEURO/PSYCH   (+) Depression   (+) Nonintractable headache      PULMONARY   (+) Mild intermittent asthma without complication        Physical Exam    Airway    Mallampati score: II  TM Distance: >3 FB  Neck ROM: full     Dental       Cardiovascular      Pulmonary      Other Findings  post-pubertal.      Anesthesia Plan  ASA Score- 2     Anesthesia Type- IV sedation with anesthesia with ASA Monitors.         Additional Monitors:     Airway Plan:            Plan Factors-Exercise tolerance (METS): >4 METS.    Chart reviewed. EKG reviewed.  Existing labs reviewed. Patient summary reviewed.                  Induction- intravenous.    Postoperative Plan-         Informed Consent- Anesthetic plan and risks discussed with patient.  I personally reviewed this patient with the CRNA. Discussed and agreed on the Anesthesia Plan with the CRNA..      NPO Status:  Vitals Value Taken Time   Date of last liquid 02/26/25 02/26/25 0658   Time of last liquid 0300 02/26/25 0658   Date of last solid 02/24/25 02/26/25 0658   Time of last solid 1900 02/26/25 0658

## 2025-02-26 NOTE — H&P
History and Physical - [facility]  Ewa Nolan 54 y.o. female MRN: [id number]                  HPI: Ewa Nolan is a 54 y.o. year old female who presents for egd for Fiore's and colonoscopy for screening.      REVIEW OF SYSTEMS: Per the HPI, and otherwise unremarkable.    Historical Information   Past Medical History:   Diagnosis Date    Asthma     exercise induced    Colon polyp     COVID-19     Depression     Diabetes mellitus (HCC)     Encounter for screening for malignant neoplasm of colon 2019    GERD (gastroesophageal reflux disease)     Glaucoma     Psychiatric disorder     depression    Varicella      Past Surgical History:   Procedure Laterality Date     SECTION  1998    CHOLECYSTECTOMY  2003    COLONOSCOPY      REFRACTIVE SURGERY Bilateral     january    UPPER GASTROINTESTINAL ENDOSCOPY       Social History   Social History     Substance and Sexual Activity   Alcohol Use Not Currently     Social History     Substance and Sexual Activity   Drug Use Not Currently     Social History     Tobacco Use   Smoking Status Former    Current packs/day: 0.00    Types: Cigarettes    Quit date: 1994    Years since quittin.1   Smokeless Tobacco Never   Tobacco Comments    No passive smoke exposure     Family History   Problem Relation Age of Onset    Hypertension Mother     Diabetes type II Mother     No Known Problems Father     Breast cancer Sister 44    BRCA1 Negative Sister     BRCA2 Negative Sister     Breast cancer Maternal Aunt         age unknown    Breast cancer Maternal Aunt         age unknown    Breast cancer Maternal Aunt         age unknown    No Known Problems Maternal Grandmother     No Known Problems Maternal Grandfather     No Known Problems Paternal Grandmother     No Known Problems Paternal Grandfather     No Known Problems Son     No Known Problems Sister     No Known Problems Sister     Stroke Brother        Meds/Allergies       Current  Outpatient Medications:     fluticasone (FLONASE) 50 mcg/act nasal spray    lisinopril (ZESTRIL) 10 mg tablet    Lumigan 0.01 % ophthalmic drops    omeprazole (PriLOSEC) 40 MG capsule    QUEtiapine Fumarate 150 MG TABS    repaglinide (PRANDIN) 1 mg tablet    rosuvastatin (CRESTOR) 10 MG tablet    sertraline (ZOLOFT) 50 mg tablet    Empagliflozin-metFORMIN HCl ER (Synjardy XR)  MG TB24    Current Facility-Administered Medications:     lactated ringers infusion, 125 mL/hr, Intravenous, Continuous    lidocaine (PF) (XYLOCAINE-MPF) 1 % injection 0.5 mL, 0.5 mL, Infiltration, , 0.5 mL at 11/27/24 1330    lidocaine (PF) (XYLOCAINE-MPF) 1 % injection 0.5 mL, 0.5 mL, Infiltration, Once PRN    triamcinolone acetonide (Kenalog-40) 40 mg/mL injection 0.5 mg, 0.5 mg, Infiltration, , 0.5 mg at 11/27/24 1330    Allergies   Allergen Reactions    Amoxicillin Hives       Objective     /69   Pulse 92   Temp 98.2 °F (36.8 °C) (Temporal)   Resp 15   LMP 02/18/2021   SpO2 98%       PHYSICAL EXAM    Gen: NAD  Head: NCAT  CV: RRR  CHEST: Clear  ABD: soft, NT/ND  EXT: no edema      ASSESSMENT/PLAN:  This is a 54 y.o. year old female here for EGD and colonoscopy, and she is stable and optimized for her procedure.

## 2025-02-27 DIAGNOSIS — I10 ESSENTIAL HYPERTENSION: ICD-10-CM

## 2025-02-27 RX ORDER — LISINOPRIL 10 MG/1
10 TABLET ORAL DAILY
Qty: 30 TABLET | Refills: 5 | Status: SHIPPED | OUTPATIENT
Start: 2025-02-27

## 2025-02-28 PROCEDURE — 88305 TISSUE EXAM BY PATHOLOGIST: CPT | Performed by: PATHOLOGY

## 2025-03-04 ENCOUNTER — RESULTS FOLLOW-UP (OUTPATIENT)
Dept: GASTROENTEROLOGY | Facility: CLINIC | Age: 55
End: 2025-03-04

## 2025-03-04 NOTE — TELEPHONE ENCOUNTER
----- Message from Lulú Anderson DO sent at 3/4/2025  8:00 AM EST -----  Please place recall colonoscopy for 5 years

## 2025-03-08 DIAGNOSIS — E78.5 HYPERLIPIDEMIA, UNSPECIFIED HYPERLIPIDEMIA TYPE: ICD-10-CM

## 2025-03-08 RX ORDER — ROSUVASTATIN CALCIUM 10 MG/1
10 TABLET, COATED ORAL DAILY
Qty: 30 TABLET | Refills: 5 | Status: SHIPPED | OUTPATIENT
Start: 2025-03-08

## 2025-04-20 DIAGNOSIS — E11.9 TYPE 2 DIABETES MELLITUS WITHOUT COMPLICATION, WITHOUT LONG-TERM CURRENT USE OF INSULIN (HCC): ICD-10-CM

## 2025-04-21 RX ORDER — EMPAGLIFLOZIN, METFORMIN HYDROCHLORIDE 25; 1000 MG/1; MG/1
1 TABLET, EXTENDED RELEASE ORAL DAILY
Qty: 30 TABLET | Refills: 0 | Status: SHIPPED | OUTPATIENT
Start: 2025-04-21

## 2025-05-07 ENCOUNTER — APPOINTMENT (OUTPATIENT)
Dept: LAB | Age: 55
End: 2025-05-07
Payer: COMMERCIAL

## 2025-05-07 DIAGNOSIS — E11.9 TYPE 2 DIABETES MELLITUS WITHOUT COMPLICATION, WITHOUT LONG-TERM CURRENT USE OF INSULIN (HCC): ICD-10-CM

## 2025-05-07 DIAGNOSIS — I10 ESSENTIAL HYPERTENSION: ICD-10-CM

## 2025-05-07 DIAGNOSIS — E78.5 HYPERLIPIDEMIA, UNSPECIFIED HYPERLIPIDEMIA TYPE: ICD-10-CM

## 2025-05-07 LAB
ALBUMIN SERPL BCG-MCNC: 4.1 G/DL (ref 3.5–5)
ALP SERPL-CCNC: 73 U/L (ref 34–104)
ALT SERPL W P-5'-P-CCNC: 24 U/L (ref 7–52)
ANION GAP SERPL CALCULATED.3IONS-SCNC: 8 MMOL/L (ref 4–13)
AST SERPL W P-5'-P-CCNC: 17 U/L (ref 13–39)
BASOPHILS # BLD AUTO: 0.05 THOUSANDS/ÂΜL (ref 0–0.1)
BASOPHILS NFR BLD AUTO: 1 % (ref 0–1)
BILIRUB SERPL-MCNC: 0.41 MG/DL (ref 0.2–1)
BUN SERPL-MCNC: 12 MG/DL (ref 5–25)
CALCIUM SERPL-MCNC: 9.2 MG/DL (ref 8.4–10.2)
CHLORIDE SERPL-SCNC: 105 MMOL/L (ref 96–108)
CHOLEST SERPL-MCNC: 169 MG/DL (ref ?–200)
CO2 SERPL-SCNC: 29 MMOL/L (ref 21–32)
CREAT SERPL-MCNC: 0.47 MG/DL (ref 0.6–1.3)
EOSINOPHIL # BLD AUTO: 0.1 THOUSAND/ÂΜL (ref 0–0.61)
EOSINOPHIL NFR BLD AUTO: 1 % (ref 0–6)
ERYTHROCYTE [DISTWIDTH] IN BLOOD BY AUTOMATED COUNT: 12.5 % (ref 11.6–15.1)
EST. AVERAGE GLUCOSE BLD GHB EST-MCNC: 154 MG/DL
GFR SERPL CREATININE-BSD FRML MDRD: 110 ML/MIN/1.73SQ M
GLUCOSE P FAST SERPL-MCNC: 120 MG/DL (ref 65–99)
HBA1C MFR BLD: 7 %
HCT VFR BLD AUTO: 42.7 % (ref 34.8–46.1)
HDLC SERPL-MCNC: 60 MG/DL
HGB BLD-MCNC: 14 G/DL (ref 11.5–15.4)
IMM GRANULOCYTES # BLD AUTO: 0.02 THOUSAND/UL (ref 0–0.2)
IMM GRANULOCYTES NFR BLD AUTO: 0 % (ref 0–2)
LDLC SERPL CALC-MCNC: 89 MG/DL (ref 0–100)
LYMPHOCYTES # BLD AUTO: 2.65 THOUSANDS/ÂΜL (ref 0.6–4.47)
LYMPHOCYTES NFR BLD AUTO: 38 % (ref 14–44)
MCH RBC QN AUTO: 29.7 PG (ref 26.8–34.3)
MCHC RBC AUTO-ENTMCNC: 32.8 G/DL (ref 31.4–37.4)
MCV RBC AUTO: 91 FL (ref 82–98)
MONOCYTES # BLD AUTO: 0.4 THOUSAND/ÂΜL (ref 0.17–1.22)
MONOCYTES NFR BLD AUTO: 6 % (ref 4–12)
NEUTROPHILS # BLD AUTO: 3.8 THOUSANDS/ÂΜL (ref 1.85–7.62)
NEUTS SEG NFR BLD AUTO: 54 % (ref 43–75)
NRBC BLD AUTO-RTO: 0 /100 WBCS
PLATELET # BLD AUTO: 212 THOUSANDS/UL (ref 149–390)
PMV BLD AUTO: 13.1 FL (ref 8.9–12.7)
POTASSIUM SERPL-SCNC: 3.7 MMOL/L (ref 3.5–5.3)
PROT SERPL-MCNC: 6.9 G/DL (ref 6.4–8.4)
RBC # BLD AUTO: 4.72 MILLION/UL (ref 3.81–5.12)
SODIUM SERPL-SCNC: 142 MMOL/L (ref 135–147)
TRIGL SERPL-MCNC: 98 MG/DL (ref ?–150)
TSH SERPL DL<=0.05 MIU/L-ACNC: 1.89 UIU/ML (ref 0.45–4.5)
WBC # BLD AUTO: 7.02 THOUSAND/UL (ref 4.31–10.16)

## 2025-05-07 PROCEDURE — 80061 LIPID PANEL: CPT

## 2025-05-07 PROCEDURE — 84443 ASSAY THYROID STIM HORMONE: CPT

## 2025-05-07 PROCEDURE — 80053 COMPREHEN METABOLIC PANEL: CPT

## 2025-05-07 PROCEDURE — 85025 COMPLETE CBC W/AUTO DIFF WBC: CPT

## 2025-05-07 PROCEDURE — 83036 HEMOGLOBIN GLYCOSYLATED A1C: CPT

## 2025-05-07 PROCEDURE — 36415 COLL VENOUS BLD VENIPUNCTURE: CPT

## 2025-05-08 ENCOUNTER — OFFICE VISIT (OUTPATIENT)
Dept: FAMILY MEDICINE CLINIC | Facility: CLINIC | Age: 55
End: 2025-05-08
Payer: COMMERCIAL

## 2025-05-08 VITALS
HEIGHT: 64 IN | TEMPERATURE: 97.2 F | BODY MASS INDEX: 40.97 KG/M2 | RESPIRATION RATE: 16 BRPM | HEART RATE: 73 BPM | SYSTOLIC BLOOD PRESSURE: 128 MMHG | WEIGHT: 240 LBS | DIASTOLIC BLOOD PRESSURE: 72 MMHG | OXYGEN SATURATION: 97 %

## 2025-05-08 DIAGNOSIS — K22.70 BARRETT'S ESOPHAGUS WITHOUT DYSPLASIA: ICD-10-CM

## 2025-05-08 DIAGNOSIS — E11.9 TYPE 2 DIABETES MELLITUS WITHOUT COMPLICATION, WITHOUT LONG-TERM CURRENT USE OF INSULIN (HCC): Primary | ICD-10-CM

## 2025-05-08 DIAGNOSIS — E78.2 MIXED HYPERLIPIDEMIA: ICD-10-CM

## 2025-05-08 DIAGNOSIS — J45.20 MILD INTERMITTENT ASTHMA WITHOUT COMPLICATION: ICD-10-CM

## 2025-05-08 DIAGNOSIS — F32.89 OTHER DEPRESSION: ICD-10-CM

## 2025-05-08 DIAGNOSIS — I10 ESSENTIAL HYPERTENSION: ICD-10-CM

## 2025-05-08 DIAGNOSIS — Z12.31 ENCOUNTER FOR SCREENING MAMMOGRAM FOR BREAST CANCER: ICD-10-CM

## 2025-05-08 DIAGNOSIS — Z00.00 HEALTHCARE MAINTENANCE: ICD-10-CM

## 2025-05-08 LAB
CREAT UR-MCNC: 28.6 MG/DL
MICROALBUMIN UR-MCNC: 13 MG/L
MICROALBUMIN/CREAT 24H UR: 45 MG/G CREATININE (ref 0–30)

## 2025-05-08 PROCEDURE — 82043 UR ALBUMIN QUANTITATIVE: CPT | Performed by: NURSE PRACTITIONER

## 2025-05-08 PROCEDURE — 82570 ASSAY OF URINE CREATININE: CPT | Performed by: NURSE PRACTITIONER

## 2025-05-08 PROCEDURE — 99214 OFFICE O/P EST MOD 30 MIN: CPT | Performed by: NURSE PRACTITIONER

## 2025-05-08 PROCEDURE — 99396 PREV VISIT EST AGE 40-64: CPT | Performed by: NURSE PRACTITIONER

## 2025-05-08 RX ORDER — ROSUVASTATIN CALCIUM 10 MG/1
10 TABLET, COATED ORAL DAILY
Qty: 90 TABLET | Refills: 1 | Status: SHIPPED | OUTPATIENT
Start: 2025-05-08

## 2025-05-08 RX ORDER — LISINOPRIL 10 MG/1
10 TABLET ORAL DAILY
Qty: 90 TABLET | Refills: 1 | Status: SHIPPED | OUTPATIENT
Start: 2025-05-08

## 2025-05-08 RX ORDER — REPAGLINIDE 1 MG/1
1 TABLET ORAL
Qty: 270 TABLET | Refills: 1 | Status: SHIPPED | OUTPATIENT
Start: 2025-05-08

## 2025-05-08 RX ORDER — EMPAGLIFLOZIN, METFORMIN HYDROCHLORIDE 25; 1000 MG/1; MG/1
1 TABLET, EXTENDED RELEASE ORAL DAILY
Qty: 90 TABLET | Refills: 1 | Status: SHIPPED | OUTPATIENT
Start: 2025-05-08

## 2025-05-08 RX ORDER — OMEPRAZOLE 40 MG/1
40 CAPSULE, DELAYED RELEASE ORAL DAILY
Qty: 90 CAPSULE | Refills: 1 | Status: SHIPPED | OUTPATIENT
Start: 2025-05-08

## 2025-05-08 RX ORDER — QUETIAPINE FUMARATE 150 MG/1
1 TABLET, FILM COATED ORAL
Qty: 90 TABLET | Refills: 1 | Status: SHIPPED | OUTPATIENT
Start: 2025-05-08

## 2025-05-08 NOTE — ASSESSMENT & PLAN NOTE
- Stable.  - Continue omeprazole 40 mg daily.  - Will continue to monitor.  Orders:  •  omeprazole (PriLOSEC) 40 MG capsule; Take 1 capsule (40 mg total) by mouth daily

## 2025-05-08 NOTE — ASSESSMENT & PLAN NOTE
Component      Latest Ref Rng 9/26/2024 5/7/2025   Cholesterol      See Comment mg/dL 175  169    Triglycerides      See Comment mg/dL 106  98    HDL      >=50 mg/dL 56  60    LDL Calculated      0 - 100 mg/dL 98  89      - Well controlled.  - Continue Crestor 10 mg daily.  - Encourage healthy diet and regular exercise.  - Will continue to monitor fasting lipid panel.   Orders:  •  rosuvastatin (CRESTOR) 10 MG tablet; Take 1 tablet (10 mg total) by mouth daily  •  Lipid Panel with Direct LDL reflex; Future

## 2025-05-08 NOTE — ASSESSMENT & PLAN NOTE
- Stable on Prandin and Synjardy. Continue same.   - Encourage low carb diet and regular exercise.  - Foot exam performed in office today.   - Will continue to monitor.   Lab Results   Component Value Date    HGBA1C 7.0 (H) 05/07/2025       Orders:  •  Empagliflozin-metFORMIN HCl ER (Synjardy XR)  MG TB24; Take 1 tablet by mouth daily  •  repaglinide (PRANDIN) 1 mg tablet; Take 1 tablet (1 mg total) by mouth 3 (three) times a day before meals  •  Comprehensive metabolic panel; Future  •  Hemoglobin A1C; Future  •  Lipid Panel with Direct LDL reflex; Future  •  Albumin / creatinine urine ratio; Future

## 2025-05-08 NOTE — PROGRESS NOTES
Name: Ewa Nolan      : 1970      MRN: [id number]  Encounter Provider: GHISLAINE Dukes  Encounter Date: 2025   Encounter department: [facility] [address] [facility]  :  Assessment & Plan  Type 2 diabetes mellitus without complication, without long-term current use of insulin (HCC)  - Stable on Prandin and Synjardy. Continue same.   - Encourage low carb diet and regular exercise.  - Foot exam performed in office today.   - Will continue to monitor.   Lab Results   Component Value Date    HGBA1C 7.0 (H) 2025       Orders:  •  Empagliflozin-metFORMIN HCl ER (Synjardy XR)  MG TB24; Take 1 tablet by mouth daily  •  repaglinide (PRANDIN) 1 mg tablet; Take 1 tablet (1 mg total) by mouth 3 (three) times a day before meals  •  Comprehensive metabolic panel; Future  •  Hemoglobin A1C; Future  •  Lipid Panel with Direct LDL reflex; Future  •  Albumin / creatinine urine ratio; Future    Essential hypertension  - Well controlled on lisinopril 10 mg daily. Continue same.   - Will continue to monitor.  Orders:  •  lisinopril (ZESTRIL) 10 mg tablet; Take 1 tablet (10 mg total) by mouth daily    Mixed hyperlipidemia  Component      Latest Ref Rng 2024   Cholesterol      See Comment mg/dL 175  169    Triglycerides      See Comment mg/dL 106  98    HDL      >=50 mg/dL 56  60    LDL Calculated      0 - 100 mg/dL 98  89      - Well controlled.  - Continue Crestor 10 mg daily.  - Encourage healthy diet and regular exercise.  - Will continue to monitor fasting lipid panel.   Orders:  •  rosuvastatin (CRESTOR) 10 MG tablet; Take 1 tablet (10 mg total) by mouth daily  •  Lipid Panel with Direct LDL reflex; Future    Fiore's esophagus without dysplasia  - Stable.  - Continue omeprazole 40 mg daily.  - Will continue to monitor.  Orders:  •  omeprazole (PriLOSEC) 40 MG capsule; Take 1 capsule (40 mg total) by mouth daily    Other depression  - Well controlled on Zoloft and Seroquel.  Continue same.   - Will continue to monitor.    Orders:  •  QUEtiapine Fumarate 150 MG TABS; Take 1 tablet by mouth daily at bedtime  •  sertraline (ZOLOFT) 50 mg tablet; Take 1 tablet (50 mg total) by mouth daily    Mild intermittent asthma without complication  - Well controlled.  - Continue albuterol as needed.  - Will continue to monitor.       Healthcare maintenance  - Reviewed immunizations and screenings.   - UTD with dental, vision, and GYN exams.  - UTD with colonoscopy.  - Mammogram due in August.   Orders:  •  CBC and differential; Future  •  Comprehensive metabolic panel; Future  •  Lipid Panel with Direct LDL reflex; Future  •  TSH, 3rd generation with Free T4 reflex; Future    Encounter for screening mammogram for breast cancer    Orders:  •  Mammo screening bilateral w 3d and cad; Future           History of Present Illness   Patient with PMH of GERD, HTN, HLD, type 2 diabetes, and depression presents to office today for follow up. She is taking her prescribed medications and reports no side effects. She had her blood work done this morning which showed hemoglobin A1c of 7.0. The rest of her labs were stable. She is just getting over the loss of her dog but other than that she states she is doing well. She denies any significant concerns or complaints.       Diabetes  She has type 2 diabetes mellitus. No MedicAlert identification noted. The initial diagnosis of diabetes was made 10 years ago. Pertinent negatives for hypoglycemia include no confusion, dizziness, headaches, hunger, mood changes, nervousness/anxiousness, pallor, seizures, sleepiness or sweats. Pertinent negatives for diabetes include no blurred vision, no chest pain, no fatigue, no foot paresthesias, no foot ulcerations, no polydipsia, no polyphagia, no polyuria, no visual change and no weight loss. Pertinent negatives for hypoglycemia complications include no blackouts, no hospitalization, no nocturnal hypoglycemia, no required  "assistance and no required glucagon injection. Symptoms are stable. Pertinent negatives for diabetic complications include no CVA, heart disease, impotence, nephropathy, peripheral neuropathy, PVD or retinopathy. Risk factors for coronary artery disease include obesity, post-menopausal and stress. Current diabetic treatment includes oral agent (dual therapy). She is compliant with treatment most of the time. Her weight is stable. She is following a generally healthy diet. Meal planning includes avoidance of concentrated sweets. She has not had a previous visit with a dietitian. She participates in exercise intermittently. Her breakfast blood glucose is taken between 6-7 am. Her lunch blood glucose is taken between 11-12 pm. Her dinner blood glucose is taken between 5-6 pm. Her bedtime blood glucose is taken between 9-10 pm. She sees a podiatrist.Eye exam is current.     Review of Systems   Constitutional:  Negative for fatigue, fever and weight loss.   HENT:  Negative for congestion, postnasal drip and trouble swallowing.    Eyes:  Negative for blurred vision and visual disturbance.   Respiratory:  Negative for cough and shortness of breath.    Cardiovascular:  Negative for chest pain and palpitations.   Gastrointestinal:  Negative for abdominal pain and blood in stool.   Endocrine: Negative for polydipsia, polyphagia and polyuria.   Genitourinary:  Negative for difficulty urinating, dysuria, frequency and impotence.   Musculoskeletal:  Negative for gait problem.   Skin:  Negative for pallor and rash.   Neurological:  Negative for dizziness, seizures and headaches.   Hematological:  Negative for adenopathy.   Psychiatric/Behavioral:  Negative for confusion. The patient is not nervous/anxious.        Objective   /72 (BP Location: Left arm, Patient Position: Sitting, Cuff Size: Large)   Pulse 73   Temp (!) 97.2 °F (36.2 °C) (Tympanic)   Resp 16   Ht 5' 4\" (1.626 m)   Wt 109 kg (240 lb)   LMP 02/18/2021   " SpO2 97%   BMI 41.20 kg/m²      Physical Exam  Vitals and nursing note reviewed.   Constitutional:       General: She is not in acute distress.     Appearance: Normal appearance. She is well-developed. She is not ill-appearing.   HENT:      Head: Normocephalic and atraumatic.      Right Ear: Tympanic membrane, ear canal and external ear normal.      Left Ear: Tympanic membrane, ear canal and external ear normal.      Nose: Nose normal.      Mouth/Throat:      Mouth: Mucous membranes are moist.      Pharynx: No posterior oropharyngeal erythema.   Eyes:      Conjunctiva/sclera: Conjunctivae normal.      Pupils: Pupils are equal, round, and reactive to light.   Cardiovascular:      Rate and Rhythm: Normal rate and regular rhythm.      Heart sounds: Normal heart sounds.   Pulmonary:      Effort: Pulmonary effort is normal.      Breath sounds: Normal breath sounds.   Abdominal:      General: Bowel sounds are normal.      Palpations: Abdomen is soft.   Musculoskeletal:         General: Normal range of motion.      Cervical back: Normal range of motion.   Skin:     General: Skin is warm and dry.   Neurological:      Mental Status: She is alert and oriented to person, place, and time.   Psychiatric:         Mood and Affect: Mood normal.         Behavior: Behavior normal.

## 2025-05-08 NOTE — ASSESSMENT & PLAN NOTE
- Well controlled on Zoloft and Seroquel. Continue same.   - Will continue to monitor.    Orders:  •  QUEtiapine Fumarate 150 MG TABS; Take 1 tablet by mouth daily at bedtime  •  sertraline (ZOLOFT) 50 mg tablet; Take 1 tablet (50 mg total) by mouth daily

## 2025-05-08 NOTE — ASSESSMENT & PLAN NOTE
- Well controlled on lisinopril 10 mg daily. Continue same.   - Will continue to monitor.  Orders:  •  lisinopril (ZESTRIL) 10 mg tablet; Take 1 tablet (10 mg total) by mouth daily

## 2025-05-08 NOTE — ASSESSMENT & PLAN NOTE
- Reviewed immunizations and screenings.   - UTD with dental, vision, and GYN exams.  - UTD with colonoscopy.  - Mammogram due in August.   Orders:  •  CBC and differential; Future  •  Comprehensive metabolic panel; Future  •  Lipid Panel with Direct LDL reflex; Future  •  TSH, 3rd generation with Free T4 reflex; Future

## 2025-06-07 PROBLEM — Z00.00 HEALTHCARE MAINTENANCE: Status: RESOLVED | Noted: 2025-05-08 | Resolved: 2025-06-07

## 2025-08-11 ENCOUNTER — PATIENT MESSAGE (OUTPATIENT)
Dept: FAMILY MEDICINE CLINIC | Facility: CLINIC | Age: 55
End: 2025-08-11